# Patient Record
Sex: FEMALE | Race: BLACK OR AFRICAN AMERICAN | Employment: OTHER | URBAN - METROPOLITAN AREA
[De-identification: names, ages, dates, MRNs, and addresses within clinical notes are randomized per-mention and may not be internally consistent; named-entity substitution may affect disease eponyms.]

---

## 2017-08-16 ENCOUNTER — HOSPITAL ENCOUNTER (INPATIENT)
Age: 82
LOS: 5 days | Discharge: NURSING, REHAB OR LONG-TERM CARE | DRG: 871 | End: 2017-08-22
Attending: EMERGENCY MEDICINE | Admitting: INTERNAL MEDICINE
Payer: MEDICARE

## 2017-08-16 ENCOUNTER — APPOINTMENT (OUTPATIENT)
Dept: GENERAL RADIOLOGY | Age: 82
DRG: 871 | End: 2017-08-16
Attending: EMERGENCY MEDICINE
Payer: MEDICARE

## 2017-08-16 ENCOUNTER — APPOINTMENT (OUTPATIENT)
Dept: CT IMAGING | Age: 82
DRG: 871 | End: 2017-08-16
Attending: EMERGENCY MEDICINE
Payer: MEDICARE

## 2017-08-16 DIAGNOSIS — R65.20 SEVERE SEPSIS (HCC): ICD-10-CM

## 2017-08-16 DIAGNOSIS — R47.01 APHASIA: Primary | ICD-10-CM

## 2017-08-16 DIAGNOSIS — A41.9 SEVERE SEPSIS (HCC): ICD-10-CM

## 2017-08-16 LAB
ALBUMIN SERPL-MCNC: 2.3 G/DL (ref 3.2–4.6)
ALBUMIN/GLOB SERPL: 0.6 {RATIO} (ref 1.2–3.5)
ALP SERPL-CCNC: 91 U/L (ref 50–136)
ALT SERPL-CCNC: 19 U/L (ref 12–65)
ANION GAP SERPL CALC-SCNC: 14 MMOL/L (ref 7–16)
AST SERPL-CCNC: 19 U/L (ref 15–37)
BASOPHILS # BLD: 0 K/UL (ref 0–0.2)
BASOPHILS NFR BLD: 0 % (ref 0–2)
BILIRUB SERPL-MCNC: 1.1 MG/DL (ref 0.2–1.1)
BUN SERPL-MCNC: 25 MG/DL (ref 8–23)
CALCIUM SERPL-MCNC: 8.5 MG/DL (ref 8.3–10.4)
CHLORIDE SERPL-SCNC: 107 MMOL/L (ref 98–107)
CO2 SERPL-SCNC: 24 MMOL/L (ref 21–32)
CREAT SERPL-MCNC: 1.29 MG/DL (ref 0.6–1)
DIFFERENTIAL METHOD BLD: ABNORMAL
EOSINOPHIL # BLD: 0 K/UL (ref 0–0.8)
EOSINOPHIL NFR BLD: 0 % (ref 0.5–7.8)
ERYTHROCYTE [DISTWIDTH] IN BLOOD BY AUTOMATED COUNT: 13.5 % (ref 11.9–14.6)
GLOBULIN SER CALC-MCNC: 4 G/DL (ref 2.3–3.5)
GLUCOSE BLD STRIP.AUTO-MCNC: 306 MG/DL (ref 65–100)
GLUCOSE SERPL-MCNC: 306 MG/DL (ref 65–100)
HCT VFR BLD AUTO: 38.7 % (ref 35.8–46.3)
HGB BLD-MCNC: 12.8 G/DL (ref 11.7–15.4)
IMM GRANULOCYTES # BLD: 0.2 K/UL (ref 0–0.5)
IMM GRANULOCYTES NFR BLD: 0.6 % (ref 0–5)
INR BLD: 1.7 (ref 0.9–1.2)
LACTATE BLD-SCNC: 3 MMOL/L (ref 0.5–1.9)
LYMPHOCYTES # BLD: 0.5 K/UL (ref 0.5–4.6)
LYMPHOCYTES NFR BLD: 2 % (ref 13–44)
MAGNESIUM SERPL-MCNC: 2.1 MG/DL (ref 1.8–2.4)
MCH RBC QN AUTO: 29 PG (ref 26.1–32.9)
MCHC RBC AUTO-ENTMCNC: 33.1 G/DL (ref 31.4–35)
MCV RBC AUTO: 87.6 FL (ref 79.6–97.8)
MONOCYTES # BLD: 1.2 K/UL (ref 0.1–1.3)
MONOCYTES NFR BLD: 5 % (ref 4–12)
NEUTS SEG # BLD: 23.4 K/UL (ref 1.7–8.2)
NEUTS SEG NFR BLD: 92 % (ref 43–78)
PLATELET # BLD AUTO: 237 K/UL (ref 150–450)
PMV BLD AUTO: 10.5 FL (ref 10.8–14.1)
POTASSIUM SERPL-SCNC: 3.9 MMOL/L (ref 3.5–5.1)
PROT SERPL-MCNC: 6.3 G/DL (ref 6.3–8.2)
PT BLD: 19.5 SECS (ref 9.6–11.6)
RBC # BLD AUTO: 4.42 M/UL (ref 4.05–5.25)
SODIUM SERPL-SCNC: 145 MMOL/L (ref 136–145)
WBC # BLD AUTO: 25.2 K/UL (ref 4.3–11.1)

## 2017-08-16 PROCEDURE — 87641 MR-STAPH DNA AMP PROBE: CPT | Performed by: EMERGENCY MEDICINE

## 2017-08-16 PROCEDURE — 85025 COMPLETE CBC W/AUTO DIFF WBC: CPT | Performed by: EMERGENCY MEDICINE

## 2017-08-16 PROCEDURE — 87040 BLOOD CULTURE FOR BACTERIA: CPT | Performed by: EMERGENCY MEDICINE

## 2017-08-16 PROCEDURE — 70450 CT HEAD/BRAIN W/O DYE: CPT

## 2017-08-16 PROCEDURE — 82962 GLUCOSE BLOOD TEST: CPT

## 2017-08-16 PROCEDURE — 87205 SMEAR GRAM STAIN: CPT | Performed by: EMERGENCY MEDICINE

## 2017-08-16 PROCEDURE — 87077 CULTURE AEROBIC IDENTIFY: CPT | Performed by: EMERGENCY MEDICINE

## 2017-08-16 PROCEDURE — 93005 ELECTROCARDIOGRAM TRACING: CPT | Performed by: EMERGENCY MEDICINE

## 2017-08-16 PROCEDURE — 71010 XR CHEST PORT: CPT

## 2017-08-16 PROCEDURE — 96367 TX/PROPH/DG ADDL SEQ IV INF: CPT | Performed by: EMERGENCY MEDICINE

## 2017-08-16 PROCEDURE — 74011250636 HC RX REV CODE- 250/636: Performed by: EMERGENCY MEDICINE

## 2017-08-16 PROCEDURE — 87186 SC STD MICRODIL/AGAR DIL: CPT | Performed by: EMERGENCY MEDICINE

## 2017-08-16 PROCEDURE — 96361 HYDRATE IV INFUSION ADD-ON: CPT | Performed by: EMERGENCY MEDICINE

## 2017-08-16 PROCEDURE — 83735 ASSAY OF MAGNESIUM: CPT | Performed by: EMERGENCY MEDICINE

## 2017-08-16 PROCEDURE — 80053 COMPREHEN METABOLIC PANEL: CPT | Performed by: EMERGENCY MEDICINE

## 2017-08-16 PROCEDURE — 74011000258 HC RX REV CODE- 258: Performed by: EMERGENCY MEDICINE

## 2017-08-16 PROCEDURE — 96365 THER/PROPH/DIAG IV INF INIT: CPT | Performed by: EMERGENCY MEDICINE

## 2017-08-16 PROCEDURE — 83605 ASSAY OF LACTIC ACID: CPT

## 2017-08-16 PROCEDURE — 85610 PROTHROMBIN TIME: CPT

## 2017-08-16 PROCEDURE — 99285 EMERGENCY DEPT VISIT HI MDM: CPT | Performed by: EMERGENCY MEDICINE

## 2017-08-16 RX ADMIN — SODIUM CHLORIDE 1000 ML: 900 INJECTION, SOLUTION INTRAVENOUS at 22:17

## 2017-08-16 RX ADMIN — CEFEPIME 1 G: 1 INJECTION, POWDER, FOR SOLUTION INTRAMUSCULAR; INTRAVENOUS at 23:15

## 2017-08-16 RX ADMIN — VANCOMYCIN HYDROCHLORIDE 1000 MG: 1 INJECTION, POWDER, LYOPHILIZED, FOR SOLUTION INTRAVENOUS at 23:49

## 2017-08-17 ENCOUNTER — APPOINTMENT (OUTPATIENT)
Dept: CT IMAGING | Age: 82
DRG: 871 | End: 2017-08-17
Attending: INTERNAL MEDICINE
Payer: MEDICARE

## 2017-08-17 PROBLEM — I63.9 CVA (CEREBRAL VASCULAR ACCIDENT) (HCC): Status: ACTIVE | Noted: 2017-08-17

## 2017-08-17 LAB
APPEARANCE UR: ABNORMAL
ATRIAL RATE: 97 BPM
BACTERIA SPEC CULT: NORMAL
BACTERIA URNS QL MICRO: ABNORMAL /HPF
BILIRUB UR QL: NEGATIVE
CALCULATED R AXIS, ECG10: 14 DEGREES
CALCULATED T AXIS, ECG11: 19 DEGREES
CASTS URNS QL MICRO: ABNORMAL /LPF
CHOLEST SERPL-MCNC: 60 MG/DL
COLOR UR: YELLOW
DIAGNOSIS, 93000: NORMAL
EPI CELLS #/AREA URNS HPF: ABNORMAL /HPF
EST. AVERAGE GLUCOSE BLD GHB EST-MCNC: 146 MG/DL
GLUCOSE BLD STRIP.AUTO-MCNC: 166 MG/DL (ref 65–100)
GLUCOSE BLD STRIP.AUTO-MCNC: 178 MG/DL (ref 65–100)
GLUCOSE BLD STRIP.AUTO-MCNC: 188 MG/DL (ref 65–100)
GLUCOSE BLD STRIP.AUTO-MCNC: 194 MG/DL (ref 65–100)
GLUCOSE BLD STRIP.AUTO-MCNC: 225 MG/DL (ref 65–100)
GLUCOSE UR STRIP.AUTO-MCNC: 100 MG/DL
HBA1C MFR BLD: 6.7 % (ref 4.8–6)
HDLC SERPL-MCNC: 26 MG/DL (ref 40–60)
HDLC SERPL: 2.3 {RATIO}
HGB UR QL STRIP: ABNORMAL
KETONES UR QL STRIP.AUTO: NEGATIVE MG/DL
LACTATE BLD-SCNC: 3.8 MMOL/L (ref 0.5–1.9)
LACTATE SERPL-SCNC: 3.2 MMOL/L (ref 0.4–2)
LACTATE SERPL-SCNC: 3.3 MMOL/L (ref 0.4–2)
LACTATE SERPL-SCNC: 3.8 MMOL/L (ref 0.4–2)
LDLC SERPL CALC-MCNC: 31.4 MG/DL
LEUKOCYTE ESTERASE UR QL STRIP.AUTO: ABNORMAL
LIPID PROFILE,FLP: ABNORMAL
NITRITE UR QL STRIP.AUTO: NEGATIVE
PH UR STRIP: 7.5 [PH] (ref 5–9)
PROCALCITONIN SERPL-MCNC: 3.1 NG/ML
PROT UR STRIP-MCNC: 100 MG/DL
Q-T INTERVAL, ECG07: 334 MS
QRS DURATION, ECG06: 64 MS
QTC CALCULATION (BEZET), ECG08: 413 MS
RBC #/AREA URNS HPF: ABNORMAL /HPF
SERVICE CMNT-IMP: NORMAL
SP GR UR REFRACTOMETRY: 1.02 (ref 1–1.02)
TRIGL SERPL-MCNC: 13 MG/DL (ref 35–150)
UROBILINOGEN UR QL STRIP.AUTO: 0.2 EU/DL (ref 0.2–1)
VENTRICULAR RATE, ECG03: 92 BPM
VLDLC SERPL CALC-MCNC: 2.6 MG/DL (ref 6–23)
WBC URNS QL MICRO: >100 /HPF

## 2017-08-17 PROCEDURE — 65660000000 HC RM CCU STEPDOWN

## 2017-08-17 PROCEDURE — 87186 SC STD MICRODIL/AGAR DIL: CPT | Performed by: INTERNAL MEDICINE

## 2017-08-17 PROCEDURE — 87641 MR-STAPH DNA AMP PROBE: CPT | Performed by: FAMILY MEDICINE

## 2017-08-17 PROCEDURE — 83605 ASSAY OF LACTIC ACID: CPT | Performed by: INTERNAL MEDICINE

## 2017-08-17 PROCEDURE — 74011250637 HC RX REV CODE- 250/637: Performed by: INTERNAL MEDICINE

## 2017-08-17 PROCEDURE — 77030034849

## 2017-08-17 PROCEDURE — 74011000258 HC RX REV CODE- 258: Performed by: INTERNAL MEDICINE

## 2017-08-17 PROCEDURE — 80061 LIPID PANEL: CPT | Performed by: INTERNAL MEDICINE

## 2017-08-17 PROCEDURE — 86580 TB INTRADERMAL TEST: CPT | Performed by: INTERNAL MEDICINE

## 2017-08-17 PROCEDURE — 77030027138 HC INCENT SPIROMETER -A

## 2017-08-17 PROCEDURE — 87086 URINE CULTURE/COLONY COUNT: CPT | Performed by: INTERNAL MEDICINE

## 2017-08-17 PROCEDURE — 83036 HEMOGLOBIN GLYCOSYLATED A1C: CPT | Performed by: INTERNAL MEDICINE

## 2017-08-17 PROCEDURE — 74011250636 HC RX REV CODE- 250/636: Performed by: INTERNAL MEDICINE

## 2017-08-17 PROCEDURE — 83605 ASSAY OF LACTIC ACID: CPT

## 2017-08-17 PROCEDURE — 84145 PROCALCITONIN (PCT): CPT | Performed by: INTERNAL MEDICINE

## 2017-08-17 PROCEDURE — 82962 GLUCOSE BLOOD TEST: CPT

## 2017-08-17 PROCEDURE — 70450 CT HEAD/BRAIN W/O DYE: CPT

## 2017-08-17 PROCEDURE — 87088 URINE BACTERIA CULTURE: CPT | Performed by: INTERNAL MEDICINE

## 2017-08-17 PROCEDURE — 74011636637 HC RX REV CODE- 636/637: Performed by: INTERNAL MEDICINE

## 2017-08-17 PROCEDURE — 74011000302 HC RX REV CODE- 302: Performed by: INTERNAL MEDICINE

## 2017-08-17 PROCEDURE — 36415 COLL VENOUS BLD VENIPUNCTURE: CPT | Performed by: INTERNAL MEDICINE

## 2017-08-17 PROCEDURE — 81001 URINALYSIS AUTO W/SCOPE: CPT | Performed by: EMERGENCY MEDICINE

## 2017-08-17 RX ORDER — ATORVASTATIN CALCIUM 40 MG/1
40 TABLET, FILM COATED ORAL
Status: DISCONTINUED | OUTPATIENT
Start: 2017-08-17 | End: 2017-08-22 | Stop reason: HOSPADM

## 2017-08-17 RX ORDER — SODIUM CHLORIDE 0.9 % (FLUSH) 0.9 %
5-10 SYRINGE (ML) INJECTION AS NEEDED
Status: DISCONTINUED | OUTPATIENT
Start: 2017-08-17 | End: 2017-08-22 | Stop reason: HOSPADM

## 2017-08-17 RX ORDER — HEPARIN SODIUM 5000 [USP'U]/ML
5000 INJECTION, SOLUTION INTRAVENOUS; SUBCUTANEOUS EVERY 8 HOURS
Status: DISCONTINUED | OUTPATIENT
Start: 2017-08-17 | End: 2017-08-22 | Stop reason: HOSPADM

## 2017-08-17 RX ORDER — VANCOMYCIN HYDROCHLORIDE
1250 EVERY 24 HOURS
Status: DISCONTINUED | OUTPATIENT
Start: 2017-08-17 | End: 2017-08-17

## 2017-08-17 RX ORDER — ACETAMINOPHEN 650 MG/1
650 SUPPOSITORY RECTAL
Status: DISCONTINUED | OUTPATIENT
Start: 2017-08-17 | End: 2017-08-22 | Stop reason: HOSPADM

## 2017-08-17 RX ORDER — INSULIN GLARGINE 100 [IU]/ML
0.2 INJECTION, SOLUTION SUBCUTANEOUS DAILY
Status: DISCONTINUED | OUTPATIENT
Start: 2017-08-17 | End: 2017-08-22

## 2017-08-17 RX ORDER — DEXTROSE MONOHYDRATE AND SODIUM CHLORIDE 5; .9 G/100ML; G/100ML
150 INJECTION, SOLUTION INTRAVENOUS CONTINUOUS
Status: DISCONTINUED | OUTPATIENT
Start: 2017-08-17 | End: 2017-08-18

## 2017-08-17 RX ORDER — HYDRALAZINE HYDROCHLORIDE 20 MG/ML
20 INJECTION INTRAMUSCULAR; INTRAVENOUS
Status: DISCONTINUED | OUTPATIENT
Start: 2017-08-17 | End: 2017-08-22 | Stop reason: HOSPADM

## 2017-08-17 RX ORDER — SODIUM CHLORIDE 0.9 % (FLUSH) 0.9 %
5 SYRINGE (ML) INJECTION EVERY 8 HOURS
Status: DISCONTINUED | OUTPATIENT
Start: 2017-08-17 | End: 2017-08-22 | Stop reason: HOSPADM

## 2017-08-17 RX ORDER — INSULIN LISPRO 100 [IU]/ML
INJECTION, SOLUTION INTRAVENOUS; SUBCUTANEOUS EVERY 6 HOURS
Status: DISCONTINUED | OUTPATIENT
Start: 2017-08-17 | End: 2017-08-22

## 2017-08-17 RX ORDER — ACETAMINOPHEN 325 MG/1
650 TABLET ORAL
Status: DISCONTINUED | OUTPATIENT
Start: 2017-08-17 | End: 2017-08-22 | Stop reason: HOSPADM

## 2017-08-17 RX ORDER — ONDANSETRON 2 MG/ML
4 INJECTION INTRAMUSCULAR; INTRAVENOUS
Status: DISCONTINUED | OUTPATIENT
Start: 2017-08-17 | End: 2017-08-22 | Stop reason: HOSPADM

## 2017-08-17 RX ORDER — HEPARIN SODIUM 5000 [USP'U]/ML
5000 INJECTION, SOLUTION INTRAVENOUS; SUBCUTANEOUS EVERY 8 HOURS
Status: DISCONTINUED | OUTPATIENT
Start: 2017-08-17 | End: 2017-08-17 | Stop reason: SDUPTHER

## 2017-08-17 RX ORDER — SODIUM CHLORIDE 0.9 % (FLUSH) 0.9 %
5-10 SYRINGE (ML) INJECTION EVERY 8 HOURS
Status: DISCONTINUED | OUTPATIENT
Start: 2017-08-17 | End: 2017-08-17

## 2017-08-17 RX ADMIN — DEXTROSE MONOHYDRATE AND SODIUM CHLORIDE 150 ML/HR: 5; .9 INJECTION, SOLUTION INTRAVENOUS at 21:30

## 2017-08-17 RX ADMIN — TUBERCULIN PURIFIED PROTEIN DERIVATIVE 5 UNITS: 5 INJECTION INTRADERMAL at 08:58

## 2017-08-17 RX ADMIN — INSULIN LISPRO 2 UNITS: 100 INJECTION, SOLUTION INTRAVENOUS; SUBCUTANEOUS at 18:00

## 2017-08-17 RX ADMIN — INSULIN LISPRO 2 UNITS: 100 INJECTION, SOLUTION INTRAVENOUS; SUBCUTANEOUS at 11:57

## 2017-08-17 RX ADMIN — HEPARIN SODIUM 5000 UNITS: 5000 INJECTION, SOLUTION INTRAVENOUS; SUBCUTANEOUS at 15:12

## 2017-08-17 RX ADMIN — INSULIN LISPRO 2 UNITS: 100 INJECTION, SOLUTION INTRAVENOUS; SUBCUTANEOUS at 08:00

## 2017-08-17 RX ADMIN — INSULIN LISPRO 2 UNITS: 100 INJECTION, SOLUTION INTRAVENOUS; SUBCUTANEOUS at 23:05

## 2017-08-17 RX ADMIN — CEFEPIME HYDROCHLORIDE 1 G: 1 INJECTION, POWDER, FOR SOLUTION INTRAMUSCULAR; INTRAVENOUS at 22:30

## 2017-08-17 RX ADMIN — INSULIN GLARGINE 17 UNITS: 100 INJECTION, SOLUTION SUBCUTANEOUS at 09:00

## 2017-08-17 RX ADMIN — DEXTROSE MONOHYDRATE AND SODIUM CHLORIDE 75 ML/HR: 5; .9 INJECTION, SOLUTION INTRAVENOUS at 04:38

## 2017-08-17 RX ADMIN — ACETAMINOPHEN 650 MG: 650 SUPPOSITORY RECTAL at 07:08

## 2017-08-17 RX ADMIN — HEPARIN SODIUM 5000 UNITS: 5000 INJECTION, SOLUTION INTRAVENOUS; SUBCUTANEOUS at 23:04

## 2017-08-17 NOTE — ED TRIAGE NOTES
Patient arrives via EMS from [facility] with right sided facial droop, aphasia, altered mental status. EMS states she was last seen normal about 45min PTA.

## 2017-08-17 NOTE — ED NOTES
Pt noted to be shaking and holding her arms/legs very stiffly. Temp is 98.7 axillary. Would like to get a rectal temp, BP is elevated at 209/140. ? Possible seizure activity. Call placed to Dr Andreea Arroyo. Orders received.

## 2017-08-17 NOTE — PROGRESS NOTES
Pt has a pair of earrings with her and they are inside of a denture cup locked in our nurses supply/patient belonging cart. Passed on to oncoming nurse in bedside report.

## 2017-08-17 NOTE — ED NOTES
TRANSFER - OUT REPORT:    Verbal report given to Elvira Romo Pap  being transferred to 2nd floor for routine progression of care       Report consisted of patients Situation, Background, Assessment and   Recommendations(SBAR). Information from the following report(s) SBAR was reviewed with the receiving nurse. Lines:   Peripheral IV 09/09/16 Left Forearm (Active)        Opportunity for questions and clarification was provided.       Patient transported with:   [employer]

## 2017-08-17 NOTE — PROGRESS NOTES
END OF SHIFT NOTE:    INTAKE/OUTPUT     Voiding: NO  Catheter: YES  Drain:              Flatus: Patient does have flatus present. Stool:  0 occurrences. Characteristics:  Stool Assessment  Stool Color: Brown  Stool Appearance: Loose  Stool Amount: Small  Stool Source/Status: Rectum    Emesis: 0 occurrences. Characteristics:        VITAL SIGNS  Patient Vitals for the past 12 hrs:   Temp Pulse Resp BP SpO2   08/17/17 1555 100.1 °F (37.8 °C) 83 16 96/56 98 %   08/17/17 1116 (!) 100.7 °F (38.2 °C) 89 16 94/55 98 %   08/17/17 0729 (!) 102.3 °F (39.1 °C) 98 17 100/61 99 %       Pain Assessment  Pain Intensity 1: 0 (08/17/17 1555)        Patient Stated Pain Goal: 0    Ambulating  No    Shift report given to oncoming nurse at the bedside.     Dunia Pulido RN

## 2017-08-17 NOTE — PROGRESS NOTES
Primary Nurse Marcos Acharya RN and Xin Sosa RN performed a dual skin assessment on this patient No impairment noted  Rony score is 13    Pt has very dry skin with some mottling noticed to BLE and some bruising with scabs on legs and toes of L foot. Reddened but blanchable skin to reynold area and scars r/t old skin break down on sacrum area.

## 2017-08-17 NOTE — H&P
History and Physical    Patient: Krystle Chakraborty MRN: [id number]  SSN: [id number]    YOB: 1935  Age: [de-identified] y.o. Sex: female      Subjective:      Krystle Chakraborty is a [de-identified] y.o. female who presents with confusion, right-sided facial droop, and aphasia. She is accompanied by her daughter and granddaughter in ED at time of exam. Daughter reports being notified of patient's change in status earlier today from her caregiver. Patient has past history of multiple strokes, placement of DVT filter, and strong family history of CVA.       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 ([facility] [address] 1/26/2016    Hemiplegia affecting dominant side ([facility] [address] 1/26/2016    Hypertension     Mixed hyperlipidemia 1/26/2016    Neuropathy in diabetes ([facility] [address] 1/26/2016    Overactive bladder 1/26/2016    Psychiatric disorder     Stroke [facility])      Past Surgical History:   Procedure Laterality Date    HX HEENT  1950's    left sinus    HX ORTHOPAEDIC  2006    hip replacement and filter in right leg      Family History   Problem Relation Age of Onset    Other Other      aneurysm    Cancer Other      cancer    Thyroid Disease Other     Hypertension Other     Diabetes Other     Dementia Other     Heart Disease Other     Dementia Daughter      factor 5, visual heart murmur    Hypertension Daughter     Diabetes Daughter     Hypertension Mother     Heart Disease Mother     Alzheimer Father     Hypertension Father     Hypertension Paternal Aunt     Heart Disease Paternal Uncle     Hypertension Paternal Uncle     Other Maternal Grandmother      aneurysm    Hypertension Maternal Grandmother      Social History   Substance Use Topics    Smoking status: Former Smoker    Smokeless tobacco: Not on file      Comment: smoked from 15 yoa to about 78 yoa    Alcohol use No      Prior to Admission medications Medication Sig Start Date End Date Taking? Authorizing Provider   metoprolol succinate (TOPROL-XL) 100 mg tablet Take 1 Tab by mouth daily. 9/14/16   Oralia Fontanez MD   lisinopril (PRINIVIL, ZESTRIL) 40 mg tablet Take 1 Tab by mouth daily. 9/14/16   Oralia Fontanez MD   atorvastatin (LIPITOR) 80 mg tablet 1 Tab by Per G Tube route nightly. 9/14/16   Oralia Fontanez MD   insulin glargine (LANTUS) 100 unit/mL injection 24 Units by SubCUTAneous route daily. 7/7/16   Carito Juárez MD   DULoxetine (CYMBALTA) 60 mg capsule Take 1 Cap by mouth daily. 4/22/16   Carito Juárez MD        Allergies   Allergen Reactions    Pcn [Penicillins] Angioedema     Pt tolerated rocephin 11/2015       Review of Systems:  A comprehensive review of systems was negative except for that written in the History of Present Illness. Objective:     Vitals:    08/17/17 0014 08/17/17 0029 08/17/17 0045 08/17/17 0100   BP: 144/73 143/73 141/74 139/73   Pulse: 71 72 71 70   Resp: 23 23 25 24   Temp:       SpO2: 94% 95% 95% 95%   Weight:       Height:            Physical Exam:  General:  Alert, cooperative, no distress, appears stated age. Eyes:  Conjunctivae/corneas clear. PERRL, EOMs intact. Fundi benign   Ears:  Normal TMs and external ear canals both ears. Nose: Nares normal. Septum midline. Mucosa normal. No drainage or sinus tenderness. Mouth/Throat: Lips, mucosa, and tongue normal. Teeth and gums normal.   Neck: Supple, symmetrical, trachea midline, no adenopathy, thyroid: no enlargment/tenderness/nodules, no carotid bruit and no JVD. Back:   Symmetric, no curvature. ROM normal. No CVA tenderness. Lungs:   Clear to auscultation bilaterally. Heart:  Regular rate and rhythm, S1, S2 normal, no murmur, click, rub or gallop. Abdomen:   Soft, non-tender. Bowel sounds normal. No masses,  No organomegaly. Extremities: Extremities normal, atraumatic, no cyanosis or edema.    Pulses: 2+ and symmetric all extremities. Skin: Skin color, texture, turgor normal. No rashes or lesions. No decubitus ulcers observed on back, bottom, or heels. Lymph nodes: Cervical, supraclavicular, and axillary nodes normal.   Neurologic:  PERRLA; no nystagmus; +chronic R arm contracture. Non-verbal. Rouses to painful stimuli. No tremors. Assessment:   Hospital Problems  Date Reviewed: 7/14/2016          Codes Class Noted POA    CVA (cerebral vascular accident) [facility]) ICD-10-CM: I63.9  ICD-9-CM: 434.91  8/17/2017 Unknown              Plan:     1) Stroke- +strong personal history of both hemorrhagic and ischemic stroke. +change in baseline status. Await results of CT scan of head, then will re-evaluate additional imaging options. Holding aspirin until confidently can rule out hemorrhagic stroke. Patient will be NPO. 2) Sepsis- BP stable, but WBC markedly elevated at 25.2; Lactic acid mildly elevated. Procalcitonin ordered. CXR unrevealing. Await results of blood and urine culture. Continue with empiric vanc and cefepime. 3) Diabetes Mellitus, type II- holding oral anti-hyperglycemics. D5W with NS maintenance fluids. Checking POC glucose. Starting modest dose of long-acting insulin. Checking A1c.    4) DVT Prophylaxis- starting SCDs/compression hose, and subQ heparin. 5) FENGI- NPO- D5NS ordered. 6) Code Status- FULL CODE.     Signed By: Aliya Linares MD     August 17, 2017

## 2017-08-17 NOTE — PROGRESS NOTES
OT NOTE:    OT order received, chart reviewed. Pt unable to arouse this am, unresponsive. OT will follow up later as time and schedule permit. Thank you.       Lubna Olmos MS, OTR/L  8/17/2017

## 2017-08-17 NOTE — PROGRESS NOTES
SPEECH PATHOLOGY NOTE:    RN reports limited change from this am.  Will attempt bedside swallowing assessment in the am.    Jonathan Natarajan MS, CCC-SLP

## 2017-08-17 NOTE — PROGRESS NOTES
Critical lab value, lactic acid = 3.3, MD notified via telephone. No new orders received. MD stated to continue to monitor BP and urine output.

## 2017-08-17 NOTE — PROGRESS NOTES
TRANSFER - IN REPORT:    Verbal report received from Carmen Lezama RN(name) on [address]  being received from ER (unit) for routine progression of care      Report consisted of patients Situation, Background, Assessment and   Recommendations(SBAR). Information from the following report(s) Kardex was reviewed with the receiving nurse. Opportunity for questions and clarification was provided. Assessment completed upon patients arrival to unit and care assumed.

## 2017-08-17 NOTE — ED NOTES
Temp 102.3 rectally. Computer is installing updates so med was not scanned at the time of administration: Tylenol 650 mg rectal suppository. Noted BP is now stable at 157/81. Pt is becoming more alert having the episode of rigidity for approx 5 minutes.

## 2017-08-17 NOTE — ED PROVIDER NOTES
HPI Comments: Patient is an 71-year-old female who is coming in after having  Right-sided facial droop and trouble with her speech that started about 8:30 PM per the nursing home. She has a history of having a right-sided CVA as well per EMS this is not her baseline. Patient is unable to communicate any further history. She  Is able to follow simple commands. I reviewed old records which did show a hemorrhagic stroke last year. Patient is a [de-identified] y.o. female presenting with altered mental status. The history is provided by the patient. Altered mental status    Pertinent negatives include no weakness and no numbness.         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 ([facility] [address] 1/26/2016    Hemiplegia affecting dominant side ([address] 1/26/2016    Hypertension     Mixed hyperlipidemia 1/26/2016    Neuropathy in diabetes ([facility] [address] 1/26/2016    Overactive bladder 1/26/2016    Psychiatric disorder     Stroke [facility])        Past Surgical History:   Procedure Laterality Date    HX HEENT  1950's    left sinus    HX ORTHOPAEDIC  2006    hip replacement and filter in right leg         Family History:   Problem Relation Age of Onset    Other Other      aneurysm    Cancer Other      cancer    Thyroid Disease Other     Hypertension Other     Diabetes Other     Dementia Other     Heart Disease Other     Dementia Daughter      factor 5, visual heart murmur    Hypertension Daughter     Diabetes Daughter     Hypertension Mother     Heart Disease Mother     Alzheimer Father     Hypertension Father     Hypertension Paternal Aunt     Heart Disease Paternal Uncle     Hypertension Paternal Uncle     Other Maternal Grandmother      aneurysm    Hypertension Maternal Grandmother        Social History     Social History    Marital status:      Spouse name: N/A    Number of children: N/A    Years of education: N/A     Occupational History    Not on file. Social History Main Topics    Smoking status: Former Smoker    Smokeless tobacco: Not on file      Comment: smoked from 15 yoa to about 78 yoa    Alcohol use No    Drug use: No    Sexual activity: Not on file     Other Topics Concern    Not on file     Social History Narrative         ALLERGIES: Pcn [penicillins]    Review of Systems   Constitutional: Negative for chills and fever. Skin: Negative for color change, pallor, rash and wound. Neurological: Negative for weakness and numbness. Vitals:    08/16/17 2146   BP: 131/68   Pulse: 95   Resp: 18   Temp: 99.8 °F (37.7 °C)   SpO2: 94%   Weight: 85.7 kg (189 lb)   Height: 5' 7\" (1.702 m)            Physical Exam   Constitutional: She appears well-developed and well-nourished. No distress. HENT:   Head: Normocephalic and atraumatic. Eyes: Conjunctivae and EOM are normal. Pupils are equal, round, and reactive to light. No scleral icterus. Neck: Normal range of motion. Neck supple. No tracheal deviation present. No thyromegaly present. Cardiovascular: Normal rate, regular rhythm and normal heart sounds. Pulmonary/Chest: Effort normal and breath sounds normal. No stridor. No respiratory distress. She has no wheezes. She has no rales. She exhibits no tenderness. Abdominal: Soft. Bowel sounds are normal. She exhibits no distension. There is no tenderness. There is no rebound and no guarding. Neurological: She is alert. Right arm is contracted. There is weakness in all other extremities. Patient able to slightly move each of them. Patient is unable to speak. Skin: Skin is warm and dry. No rash noted. She is not diaphoretic. No erythema. Psychiatric: She has a normal mood and affect. Her behavior is normal. Judgment and thought content normal.   Nursing note and vitals reviewed.        MDM  Number of Diagnoses or Management Options  Diagnosis management comments: Patient's head CT does not show any acute bleeds or changes. I discussed case with neurology  And we both agree no TPA particularly given history of hemorrhagic stroke. Patient's blood work is coming back showing some signs of severe sepsis so I am empirically giving antibiotics and fluids and looking for source. Patient will eventually get hospital admission for further care. Lindsey Bowman MD; 8/16/2017 @10:31 PM Voice dictation software was used during the making of this note. This software is not perfect and grammatical and other typographical errors may be present.   This note has not been proofread for errors.  ===================================================================        Amount and/or Complexity of Data Reviewed  Clinical lab tests: ordered and reviewed (Results for orders placed or performed during the hospital encounter of 08/16/17  -CBC WITH AUTOMATED DIFF       Result                                            Value                         Ref Range                       WBC                                               25.2 (H)                      4.3 - 11.1 K/uL                 RBC                                               4.42                          4.05 - 5.25 M/uL                HGB                                               12.8                          11.7 - 15.4 g/dL                HCT                                               38.7                          35.8 - 46.3 %                   MCV                                               87.6                          79.6 - 97.8 FL                  MCH                                               29.0                          26.1 - 32.9 PG                  MCHC                                              33.1                          31.4 - 35.0 g/dL                RDW                                               13.5                          11.9 - 14.6 %                   PLATELET 237                           150 - 450 K/uL                  MPV                                               10.5 (L)                      10.8 - 14.1 FL                  DF                                                AUTOMATED                                                     NEUTROPHILS                                       92 (H)                        43 - 78 %                       LYMPHOCYTES                                       2 (L)                         13 - 44 %                       MONOCYTES                                         5                             4.0 - 12.0 %                    EOSINOPHILS                                       0 (L)                         0.5 - 7.8 %                     BASOPHILS                                         0                             0.0 - 2.0 %                     IMMATURE GRANULOCYTES                             0.6                           0.0 - 5.0 %                     ABS. NEUTROPHILS                                  23.4 (H)                      1.7 - 8.2 K/UL                  ABS. LYMPHOCYTES                                  0.5                           0.5 - 4.6 K/UL                  ABS. MONOCYTES                                    1.2                           0.1 - 1.3 K/UL                  ABS. EOSINOPHILS                                  0.0                           0.0 - 0.8 K/UL                  ABS. BASOPHILS                                    0.0                           0.0 - 0.2 K/UL                  ABS. IMM.  GRANS.                                  0.2                           0.0 - 0.5 K/UL             -METABOLIC PANEL, COMPREHENSIVE       Result                                            Value                         Ref Range                       Sodium                                            145                           136 - 145 mmol/L                Potassium 3.9                           3.5 - 5.1 mmol/L                Chloride                                          107                           98 - 107 mmol/L                 CO2                                               24                            21 - 32 mmol/L                  Anion gap                                         14                            7 - 16 mmol/L                   Glucose                                           306 (H)                       65 - 100 mg/dL                  BUN                                               25 (H)                        8 - 23 MG/DL                    Creatinine                                        1.29 (H)                      0.6 - 1.0 MG/DL                 GFR est AA                                        51 (L)                        >60 ml/min/1.73m2               GFR est non-AA                                    42 (L)                        >60 ml/min/1.73m2               Calcium                                           8.5                           8.3 - 10.4 MG/DL                Bilirubin, total                                  1.1                           0.2 - 1.1 MG/DL                 ALT (SGPT)                                        19                            12 - 65 U/L                     AST (SGOT)                                        19                            15 - 37 U/L                     Alk. phosphatase                                  91                            50 - 136 U/L                    Protein, total                                    6.3                           6.3 - 8.2 g/dL                  Albumin                                           2.3 (L)                       3.2 - 4.6 g/dL                  Globulin                                          4.0 (H)                       2.3 - 3.5 g/dL                  A-G Ratio                                         0.6 (L)                       1.2 - 3.5 -MAGNESIUM       Result                                            Value                         Ref Range                       Magnesium                                         2.1                           1.8 - 2.4 mg/dL            -POC LACTIC ACID       Result                                            Value                         Ref Range                       Lactic Acid (POC)                                 3.0 (H)                       0.5 - 1.9 mmol/L           -GLUCOSE, POC       Result                                            Value                         Ref Range                       Glucose (POC)                                     306 (H)                       65 - 100 mg/dL             -POC PT/INR       Result                                            Value                         Ref Range                       Prothrombin time (POC)                            19.5 (H)                      9.6 - 11.6 SECS                 INR (POC)                                         1.7 (H)                       0.9 - 1.2                  -EKG, 12 LEAD, INITIAL       Result                                            Value                         Ref Range                       Ventricular Rate                                  92                            BPM                             Atrial Rate                                       97                            BPM                             QRS Duration                                      64                            ms                              Q-T Interval                                      334                           ms                              QTC Calculation (Bezet)                           413                           ms                              Calculated R Axis                                 14                            degrees                         Calculated T Axis                                 19 degrees                         Diagnosis                                                                                                   !! AGE AND GENDER SPECIFIC ECG ANALYSIS !! Accelerated Junctional rhythm   Nonspecific T wave abnormality   Abnormal ECG   When compared with ECG of 17-FEB-2016 13:44,   Junctional rhythm has replaced Sinus rhythm   Vent.  rate has increased BY  33 BPM   Criteria for Septal infarct are no longer Present    )  Tests in the radiology section of CPT®: ordered and reviewed      ED Course       Procedures

## 2017-08-17 NOTE — PROGRESS NOTES
END OF SHIFT NOTE:    INTAKE/OUTPUT     Voiding: YES  (pt wears incontinent briefs)  Catheter: NO  Drain:              Flatus: Patient does have flatus present. Stool:  1 occurrences. Characteristics:  Stool Assessment  Stool Color: Brown  Stool Appearance: Loose  Stool Amount: Small  Stool Source/Status: Rectum    Emesis: 0 occurrences. Characteristics:        VITAL SIGNS  Patient Vitals for the past 12 hrs:   Temp Pulse Resp BP SpO2   08/17/17 0405 (!) 101.9 °F (38.8 °C) 97 17 113/56 99 %   08/17/17 0329 - (!) 101 30 143/63 97 %   08/17/17 0314 - 100 28 147/65 97 %   08/17/17 0300 - 97 28 142/64 98 %   08/17/17 0245 - 93 (!) 31 143/63 98 %   08/17/17 0215 - 95 (!) 34 135/69 -   08/17/17 0212 - 96 (!) 40 163/77 (!) 82 %   08/17/17 0100 - 70 24 139/73 95 %   08/17/17 0045 - 71 25 141/74 95 %   08/17/17 0029 - 72 23 143/73 95 %   08/17/17 0014 - 71 23 144/73 94 %   08/16/17 2359 - 72 23 155/80 95 %   08/16/17 2315 - 75 24 130/71 94 %   08/16/17 2300 - 79 26 131/74 94 %   08/16/17 2244 - 81 27 130/75 -   08/16/17 2233 - 85 28 146/78 96 %   08/16/17 2214 - 88 21 137/76 97 %   08/16/17 2159 - 89 25 131/72 97 %   08/16/17 2146 99.8 °F (37.7 °C) 95 18 131/68 94 %       Pain Assessment  Pain Intensity 1: 0 (pt can not tell me appropriately) (08/17/17 0405)             Ambulating  No    Shift report given to oncoming nurse at the bedside.     Diego Gonsalves RN

## 2017-08-17 NOTE — PROGRESS NOTES
PROGRESS NOTE    Patient seen and examined. Admitted after midnight. Unresponsive. UA with pyuria and bacteria. Order urine culture. Stop Vanc. Continue Cefepime. Increase IVFs due to elevated Lactic Acid. PT/OT/ST. PPD. Will follow closely.     Xavi Santiago DO

## 2017-08-17 NOTE — PROGRESS NOTES
SPEECH PATHOLOGY NOTE:    Orders received for bedside swallowing assessment. Patient is unresponsive at this time. Will re-attempt in the pm if there is a change in status. Thank you.     Aniya Akers MS, CCC-SLP

## 2017-08-17 NOTE — PROGRESS NOTES
Pharmacokinetic Consult to Pharmacist    Nick Gould is a [de-identified] y.o. female being treated for sepsis with vancomycin and cefepime. Height: 5' 7\" (170.2 cm)  Weight: 85.7 kg (189 lb)  Lab Results   Component Value Date/Time    BUN 25 08/16/2017 09:50 PM    Creatinine 1.29 08/16/2017 09:50 PM    WBC 25.2 08/16/2017 09:50 PM    Lactic Acid (POC) 3.8 08/17/2017 12:04 AM      Estimated Creatinine Clearance: 39.1 mL/min (based on Cr of 1.29). CULTURES:  Pending. Day 1 of vancomycin. Goal trough is 15-20. Vancomycin dose initiated at 1g x1 in ER, then 1250mg q 24h. Will continue to follow patient.       Thank you,  Maurice Louise, PharmD  Clinical Pharmacist

## 2017-08-17 NOTE — PROGRESS NOTES
's initial visit attempted. Ms. Ashley Martin was asleep. Provided business card.      Kala Bajwa 70  Board Certified

## 2017-08-17 NOTE — PROGRESS NOTES
PT note: PT evaluation received and chart reviewed. Pt unable to arouse this am, unresponsive. PT will follow up later as time and schedule permit. Thank you.     Radha Hines, PT  8/17/2017

## 2017-08-18 LAB
ANION GAP SERPL CALC-SCNC: 9 MMOL/L (ref 7–16)
BASOPHILS # BLD: 0 K/UL (ref 0–0.2)
BASOPHILS NFR BLD: 0 % (ref 0–2)
BUN SERPL-MCNC: 33 MG/DL (ref 8–23)
CALCIUM SERPL-MCNC: 7.9 MG/DL (ref 8.3–10.4)
CHLORIDE SERPL-SCNC: 119 MMOL/L (ref 98–107)
CO2 SERPL-SCNC: 23 MMOL/L (ref 21–32)
CREAT SERPL-MCNC: 1.67 MG/DL (ref 0.6–1)
DIFFERENTIAL METHOD BLD: ABNORMAL
EOSINOPHIL # BLD: 0 K/UL (ref 0–0.8)
EOSINOPHIL NFR BLD: 0 % (ref 0.5–7.8)
ERYTHROCYTE [DISTWIDTH] IN BLOOD BY AUTOMATED COUNT: 14.1 % (ref 11.9–14.6)
GLUCOSE BLD STRIP.AUTO-MCNC: 111 MG/DL (ref 65–100)
GLUCOSE BLD STRIP.AUTO-MCNC: 175 MG/DL (ref 65–100)
GLUCOSE BLD STRIP.AUTO-MCNC: 187 MG/DL (ref 65–100)
GLUCOSE BLD STRIP.AUTO-MCNC: 93 MG/DL (ref 65–100)
GLUCOSE SERPL-MCNC: 125 MG/DL (ref 65–100)
HCT VFR BLD AUTO: 38.5 % (ref 35.8–46.3)
HGB BLD-MCNC: 12.2 G/DL (ref 11.7–15.4)
IMM GRANULOCYTES # BLD: 0.2 K/UL (ref 0–0.5)
IMM GRANULOCYTES NFR BLD: 0.9 % (ref 0–5)
INR PPP: 1.1 (ref 0.9–1.2)
LACTATE SERPL-SCNC: 1.8 MMOL/L (ref 0.4–2)
LACTATE SERPL-SCNC: 2.3 MMOL/L (ref 0.4–2)
LYMPHOCYTES # BLD: 0.5 K/UL (ref 0.5–4.6)
LYMPHOCYTES NFR BLD: 2 % (ref 13–44)
MCH RBC QN AUTO: 28.2 PG (ref 26.1–32.9)
MCHC RBC AUTO-ENTMCNC: 31.7 G/DL (ref 31.4–35)
MCV RBC AUTO: 88.9 FL (ref 79.6–97.8)
MM INDURATION POC: NORMAL MM (ref 0–5)
MONOCYTES # BLD: 0.1 K/UL (ref 0.1–1.3)
MONOCYTES NFR BLD: 0 % (ref 4–12)
NEUTS SEG # BLD: 24.6 K/UL (ref 1.7–8.2)
NEUTS SEG NFR BLD: 97 % (ref 43–78)
PLATELET # BLD AUTO: 181 K/UL (ref 150–450)
PMV BLD AUTO: 11.4 FL (ref 10.8–14.1)
POTASSIUM SERPL-SCNC: 3.8 MMOL/L (ref 3.5–5.1)
PPD POC: NORMAL NEGATIVE
PROTHROMBIN TIME: 12.5 SEC (ref 9.6–12)
RBC # BLD AUTO: 4.33 M/UL (ref 4.05–5.25)
SODIUM SERPL-SCNC: 151 MMOL/L (ref 136–145)
WBC # BLD AUTO: 25.4 K/UL (ref 4.3–11.1)

## 2017-08-18 PROCEDURE — 82962 GLUCOSE BLOOD TEST: CPT

## 2017-08-18 PROCEDURE — 65660000000 HC RM CCU STEPDOWN

## 2017-08-18 PROCEDURE — 87040 BLOOD CULTURE FOR BACTERIA: CPT | Performed by: INTERNAL MEDICINE

## 2017-08-18 PROCEDURE — 94760 N-INVAS EAR/PLS OXIMETRY 1: CPT

## 2017-08-18 PROCEDURE — 80048 BASIC METABOLIC PNL TOTAL CA: CPT | Performed by: INTERNAL MEDICINE

## 2017-08-18 PROCEDURE — 83605 ASSAY OF LACTIC ACID: CPT | Performed by: INTERNAL MEDICINE

## 2017-08-18 PROCEDURE — 36415 COLL VENOUS BLD VENIPUNCTURE: CPT | Performed by: INTERNAL MEDICINE

## 2017-08-18 PROCEDURE — 85610 PROTHROMBIN TIME: CPT | Performed by: INTERNAL MEDICINE

## 2017-08-18 PROCEDURE — 97162 PT EVAL MOD COMPLEX 30 MIN: CPT

## 2017-08-18 PROCEDURE — 74011000258 HC RX REV CODE- 258: Performed by: INTERNAL MEDICINE

## 2017-08-18 PROCEDURE — 92610 EVALUATE SWALLOWING FUNCTION: CPT

## 2017-08-18 PROCEDURE — 77030019605

## 2017-08-18 PROCEDURE — 77010033678 HC OXYGEN DAILY

## 2017-08-18 PROCEDURE — 74011636637 HC RX REV CODE- 636/637: Performed by: INTERNAL MEDICINE

## 2017-08-18 PROCEDURE — 85025 COMPLETE CBC W/AUTO DIFF WBC: CPT | Performed by: INTERNAL MEDICINE

## 2017-08-18 PROCEDURE — 74011250637 HC RX REV CODE- 250/637: Performed by: INTERNAL MEDICINE

## 2017-08-18 PROCEDURE — 74011250636 HC RX REV CODE- 250/636: Performed by: INTERNAL MEDICINE

## 2017-08-18 PROCEDURE — 97165 OT EVAL LOW COMPLEX 30 MIN: CPT

## 2017-08-18 RX ORDER — DEXTROSE MONOHYDRATE AND SODIUM CHLORIDE 5; .45 G/100ML; G/100ML
150 INJECTION, SOLUTION INTRAVENOUS CONTINUOUS
Status: DISCONTINUED | OUTPATIENT
Start: 2017-08-18 | End: 2017-08-22

## 2017-08-18 RX ADMIN — CEFEPIME HYDROCHLORIDE 1 G: 1 INJECTION, POWDER, FOR SOLUTION INTRAMUSCULAR; INTRAVENOUS at 22:16

## 2017-08-18 RX ADMIN — HEPARIN SODIUM 5000 UNITS: 5000 INJECTION, SOLUTION INTRAVENOUS; SUBCUTANEOUS at 22:17

## 2017-08-18 RX ADMIN — VANCOMYCIN HYDROCHLORIDE 1000 MG: 1 INJECTION, POWDER, LYOPHILIZED, FOR SOLUTION INTRAVENOUS at 00:09

## 2017-08-18 RX ADMIN — ATORVASTATIN CALCIUM 40 MG: 40 TABLET, FILM COATED ORAL at 22:16

## 2017-08-18 RX ADMIN — Medication 5 ML: at 22:17

## 2017-08-18 RX ADMIN — Medication 5 ML: at 06:21

## 2017-08-18 RX ADMIN — HEPARIN SODIUM 5000 UNITS: 5000 INJECTION, SOLUTION INTRAVENOUS; SUBCUTANEOUS at 06:21

## 2017-08-18 RX ADMIN — HEPARIN SODIUM 5000 UNITS: 5000 INJECTION, SOLUTION INTRAVENOUS; SUBCUTANEOUS at 14:20

## 2017-08-18 RX ADMIN — DEXTROSE MONOHYDRATE AND SODIUM CHLORIDE 150 ML/HR: 5; .45 INJECTION, SOLUTION INTRAVENOUS at 17:48

## 2017-08-18 RX ADMIN — INSULIN GLARGINE 17 UNITS: 100 INJECTION, SOLUTION SUBCUTANEOUS at 09:06

## 2017-08-18 RX ADMIN — DEXTROSE MONOHYDRATE AND SODIUM CHLORIDE 150 ML/HR: 5; .45 INJECTION, SOLUTION INTRAVENOUS at 09:08

## 2017-08-18 RX ADMIN — DEXTROSE MONOHYDRATE AND SODIUM CHLORIDE 150 ML/HR: 5; .9 INJECTION, SOLUTION INTRAVENOUS at 04:00

## 2017-08-18 RX ADMIN — INSULIN LISPRO 2 UNITS: 100 INJECTION, SOLUTION INTRAVENOUS; SUBCUTANEOUS at 17:44

## 2017-08-18 RX ADMIN — Medication 5 ML: at 14:20

## 2017-08-18 NOTE — PROGRESS NOTES
Problem: Falls - Risk of  Goal: *Absence of Falls  Document Hanna Fall Risk and appropriate interventions in the flowsheet.    Outcome: Progressing Towards Goal  Fall Risk Interventions:        Mentation Interventions: Door open when patient unattended     Medication Interventions: Patient to call before getting OOB     Elimination Interventions: Call light in reach, Toileting schedule/hourly rounds

## 2017-08-18 NOTE — PROGRESS NOTES
Problem: Self Care Deficits Care Plan (Adult)  Goal: *Acute Goals and Plan of Care (Insert Text)  1. Patient will complete facial bathing with setup, additional time, verbal cues and adaptive equipment as needed. 2. Patient will complete self feeding 50% of meals with L hand. 3. Patient will tolerate 23 minutes of OT treatment with as neede rest breaks to increase activity tolerance for ADLs. 4. Patient will complete functional transfers with moderate assist and adaptive equipment as needed. Timeframe: 7 visits       OCCUPATIONAL THERAPY: Initial Assessment 8/18/2017  INPATIENT: Hospital Day: 3  [place] / [place] / Product Type: Medicare /      NAME/AGE/GENDER: Jairon Durand is a [de-identified] y.o. female             PRIMARY DIAGNOSIS:  CVA (cerebral vascular accident) ([facility] [address] <principal problem not specified> <principal problem not specified>        ICD-10: Treatment Diagnosis:        · Generalized Muscle Weakness (M62.81)  · Other lack of cordination (R27.8)  · Difficulty in walking, Not elsewhere classified (R26.2)   Precautions/Allergies:        falls, aspiration,  Pcn [penicillins]       ASSESSMENT:      Ms. Michelle Roberts presents supine in bed with HOB elevated to almost 90 degrees, just finishing with SLP bedside swallow. Pt agreeable to OT assessment. On 4L NC Oxygen. R UE is contracted and fairly fixed, reports pain with all ranging. L UE is WLFs, though weak currently, and pt reports she did use it to self feed at her SNF, longtime resident. Pt is oriented to self only. Aphasic and halted speech. Edentulous. SLP recommended 1:1 feeding, pureed and nectar thick liquids, head of bed at 90 degrees. Max assist to sit up to edge of bed and was unable to keep her balance. Returned to supine with head of bed elevated due to just having had food ingested.  Repositioned up in bed with Maximal assist.  Reports she spent most of her time in a wheelchair, but was able to self propel with 1 foot and 1 leg. Needed help with all transfers and ADLs except feeding. Unsure of pt's true prior functional ability. Pt may be functioning below her normal baseline and would benefit from skilled OT to address the functional deficits she presents with in hope to returning her to her prior functional level. Will follow for a short time. This section established at most recent assessment   PROBLEM LIST (Impairments causing functional limitations):  1. Decreased Strength  2. Decreased ADL/Functional Activities  3. Decreased Transfer Abilities  4. Decreased Ambulation Ability/Technique  5. Decreased Balance  6. Decreased Activity Tolerance  7. Decreased Cognition    INTERVENTIONS PLANNED: (Benefits and precautions of occupational therapy have been discussed with the patient.)  1. Activities of daily living training  2. Balance training  3. Therapeutic activity  4. Therapeutic exercise  5. Wheelchair management  6. Safety training      TREATMENT PLAN: Frequency/Duration: Follow patient 2-3x per week to address above goals. Rehabilitation Potential For Stated Goals: FAIR      RECOMMENDED REHABILITATION/EQUIPMENT: (at time of discharge pending progress): Continue Skilled Therapy. 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 The patient was unresponsive in the ER, and she met SIRS criteria. She was admitted for sepsis and possible new CVA. 8/18 - The patient is more alert today. Yelling out that she wants water. Denies pain. Past Medical History/Comorbidities:   Ms. Rashmi Parker  has a past medical history of Calculus of kidney; Diabetes ([address]  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 ([address] (1/26/2016); Hemiplegia affecting dominant side ([facility] [address] (1/26/2016); Hypertension; Mixed hyperlipidemia (1/26/2016); Neuropathy in diabetes ([facility] [address] (1/26/2016); Overactive bladder (1/26/2016); Psychiatric disorder; and Stroke [facility]). Ms. Staci Childress  has a past surgical history that includes orthopaedic (2006) and heent (1950's). Social History/Living Environment:   Home Environment: [address] [place] [address] Name: [employer]  # Steps to Enter: 0  One/Two Story Residence: One story  Living Alone: No  Support Systems: Child(frederic), Family member(s)  Patient Expects to be Discharged to[de-identified] Skilled nursing facility  Current DME Used/Available at Home: [employer], Wheelchair, [address], rollator, Shower chair, Commode, bedside  Tub or Shower Type: Shower  Prior Level of Function/Work/Activity:  long time resident of SNF, suspect needs help with all ADLs, but reports she fed herself, self propelled in wheelchair, non ambulatory  Dominant Side:         LEFT, since R Dominant CVA   Number of Personal Factors/Comorbidities that affect the Plan of Care: Expanded review of therapy/medical records (1-2):  MODERATE COMPLEXITY   ASSESSMENT OF OCCUPATIONAL PERFORMANCE[de-identified]   Activities of Daily Living:           Basic ADLs (From Assessment) Complex ADLs (From Assessment)   Basic ADL  Feeding: Minimum assistance, Additional time (with 1:1 feeding per SLP, 90 degrees)  Oral Facial Hygiene/Grooming: Maximum assistance  Bathing: Maximum assistance  Upper Body Dressing: Maximum assistance  Lower Body Dressing: Total assistance  Toileting: Total assistance, Adaptive equipment (muñoz in place) Instrumental ADL  Meal Preparation: Total assistance  Homemaking:  Total assistance  Medication Management: Total assistance  Financial Management: Total assistance   Grooming/Bathing/Dressing Activities of Daily Living     Cognitive Retraining  Safety/Judgement: Decreased awareness of environment;Decreased awareness of need for assistance;Decreased awareness of need for safety;Decreased insight into deficits; Fall prevention                 Functional Transfers  Toilet Transfer : Total assistance  Tub Transfer: Total assistance  Shower Transfer: Total assistance     Bed/Mat Mobility  Rolling: Maximum assistance  Supine to Sit: Maximum assistance  Sit to Supine: Maximum assistance  Bed to Chair: Total assistance  Scooting: Total assistance          Most Recent Physical Functioning:   Gross Assessment:  AROM: Grossly decreased, non-functional (R UE, L UE limited shoulder, rest is WFLs)  PROM: Grossly decreased, non-functional (R UE contracted, L UE WFLs)  Strength: Grossly decreased, non-functional (R UE, weak L UE)  Coordination: Grossly decreased, non-functional (R UE, L WFLs)  Tone: Abnormal (R UE, L UE WFLS)  Sensation: Impaired (B hands, R worse than L)               Posture:     Balance:  Sitting: Impaired; With support  Sitting - Static: Fair (occasional)  Sitting - Dynamic: Poor (constant support) Bed Mobility:  Rolling: Maximum assistance  Supine to Sit: Maximum assistance  Sit to Supine: Maximum assistance  Scooting:  Total assistance  Wheelchair Mobility:     Transfers:  Bed to Chair: Total assistance              Patient Vitals for the past 6 hrs:       BP SpO2 O2 Flow Rate (L/min) Pulse   08/18/17 0722 107/57 96 % - 97   08/18/17 0943 - 97 % 2 l/min -        Mental Status  Neurologic State: Alert  Orientation Level: Oriented to person, Disoriented to place, Disoriented to situation, Disoriented to time  Cognition: Decreased attention/concentration, Follows commands  Perception: Cues to attend to right side of body, Cues to maintain midline in sitting, Tactile, Verbal, Visual  Perseveration: No perseveration noted  Safety/Judgement: Decreased awareness of environment, Decreased awareness of need for assistance, Decreased awareness of need for safety, Decreased insight into deficits, Fall prevention Physical Skills Involved:  1. Range of Motion  2. Balance  3. Strength  4. Activity Tolerance  5. Sensation Cognitive Skills Affected (resulting in the inability to perform in a timely and safe manner):  1. Perception  2. Executive Function  3. Sustained Attention  4. Divided Attention  5. Comprehension  6. Expression Psychosocial Skills Affected:  1. Habits/Routines  2. Environmental Adaptation  3. Emotional Regulation  4. Self-Awareness  5. Awareness of Others   Number of elements that affect the Plan of Care: 5+:  HIGH COMPLEXITY   CLINICAL DECISION MAKING:   [facility] AM-PAC 6 Clicks   Daily Activity Inpatient Short Form  How much help from another person does the patient currently need. .. Total A Lot A Little None   1. Putting on and taking off regular lower body clothing? [X] 1   [ ] 2   [ ] 3   [ ] 4   2. Bathing (including washing, rinsing, drying)? [X] 1   [ ] 2   [ ] 3   [ ] 4   3. Toileting, which includes using toilet, bedpan or urinal?   [X] 1   [ ] 2   [ ] 3   [ ] 4   4. Putting on and taking off regular upper body clothing? [X] 1   [ ] 2   [ ] 3   [ ] 4   5. Taking care of personal grooming such as brushing teeth? [X] 1   [ ] 2   [ ] 3   [ ] 4   6. Eating meals? [X] 1   [ ] 2   [ ] 3   [ ] 4   © 2007, Trustees of [facility], under license to [email or web address]. All rights reserved    Score:  Initial: 6, completed 8/18/2017 Most Recent: X (Date: -- )     Interpretation of Tool:  Represents activities that are increasingly more difficult (i.e. Bed mobility, Transfers, Gait).        Score 24 23 22-20 19-15 14-10 9-7 6       Modifier CH CI CJ CK CL CM CN         · Self Care:               - CURRENT STATUS:    CN - 100% impaired, limited or restricted               - GOAL STATUS:           CM - 80%-99% impaired, limited or restricted               - D/C STATUS:                       ---------------To be determined---------------  [place] / Product Type: Medicare /       Medical Necessity:     · Patient is expected to demonstrate progress in coordination and functional technique to increase independence with self feeding and simple grooming tasks. Reason for Services/Other Comments:  · Patient continues to require skilled intervention due to s/p above and decreased simple ADLs and mobility. Use of outcome tool(s) and clinical judgement create a POC that gives a: LOW COMPLEXITY             TREATMENT:   (In addition to Assessment/Re-Assessment sessions the following treatments were rendered)      Pre-treatment Symptoms/Complaints:  Agreeable to OT assessment  Pain: Initial:   Pain Intensity 1: 0 (only reported pain when OT tried to PROM R UE)  Post Session:  same      Assessment/Reassessment only, no treatment provided today     Braces/Orthotics/Lines/Etc:   · O2 Device: Nasal cannula  Treatment/Session Assessment:    · Response to Treatment:  Fair tolerated eval, poor sitting balance, contracted RUE, LUE weak  · Interdisciplinary Collaboration:  · Occupational Therapist  · Registered Nurse  ·   · Certified Nursing Assistant/Patient Care Technician  · After treatment position/precautions:  · Bed/Chair-wheels locked  · Bed in low position  · Call light within reach  · RN notified  · Side rails x 3  · Head of bed elevated to max level s/p feeding with SLP  · Compliance with Program/Exercises: Will assess as treatment progresses. Compliant today. · Recommendations/Intent for next treatment session: \"Next visit will focus on reduction in assistance provided\".   Total Treatment Duration:  30 mins  OT Patient Time In/Time Out  Time In: 0930  Time Out: 1000     HERIBERTO Herrera MS, OTR/L

## 2017-08-18 NOTE — PROGRESS NOTES
LTG: Patient will tolerate least restrictive diet without overt signs or symptoms of airway compromise. STG: Patient will tolerate puree and nectar thick liquids without overt signs or symptoms of airway compromise. STG: Patient will participate in modified barium swallow study as clinically indicated. Speech language pathology: bedside swallow note: Initial Assessment    NAME/AGE/GENDER: Kevin Seth is a [de-identified] y.o. female  DATE: 8/18/2017  PRIMARY DIAGNOSIS: CVA (cerebral vascular accident) [facility])       ICD-10: Treatment Diagnosis: R13.12 Oropharyngeal Dysphagia. INTERDISCIPLINARY COLLABORATION: Registered Nurse  PRECAUTIONS/ALLERGIES: Pcn [penicillins] ASSESSMENT:Based on the objective data described below, Ms. Bronson Gordillo presents with moderate oropharyngeal dysphagia. Patient previously seen by speech therapy in 9/2016 with recommendations of mechanical soft with nectar thick liquids. Patient reports continued use of nectar thick liquids at home. She is currently edentulous and states that she does not consume chewable textures. Patient was presented thin liquid via tsp and cup; nectar via cup and straw; and puree. Patient with delayed swallow initiation on all trials. Immediate coughing following thin liquid via cup. No overt signs or symptoms noted with nectar via straw and puree. Patient declined trials of mixed consistency as she did not feel she would be able to chew texture. Recommend PUREE and NECTAR thick liquids. Medications crushed in puree. Upright seating with all po intake. 1:1 assistance with meals to assist with feeding. ST to follow up x1 to assess for diet tolerance. Patient will benefit from skilled intervention to address the below impairments. ?????? ? ? This section established at most recent assessment??????????  PROBLEM LIST (Impairments causing functional limitations):  1.  Oropharyngeal dysphagia  REHABILITATION POTENTIAL FOR STATED GOALS: Fair  PLAN OF CARE:   Patient will benefit from skilled intervention to address the following impairments. RECOMMENDATIONS AND PLANNED INTERVENTIONS (Benefits and precautions of therapy have been discussed with the patient.):  · PO:  Pureed  · Liquids:  nectar  MEDICATIONS:  · Crushed in puree  COMPENSATORY STRATEGIES/MODIFICATIONS INCLUDING:  · Alternate liquids/solids  · Small sips and bites  OTHER RECOMMENDATIONS (including follow up treatment recommendations):   · Patient education  RECOMMENDED DIET MODIFICATIONS DISCUSSED WITH:  · Nursing  · Patient  FREQUENCY/DURATION: Continue to follow patient 3 times a week for duration of hospital stay to address above goals. RECOMMENDED REHABILITATION/EQUIPMENT: (at time of discharge pending progress):   Continue Skilled Therapy. SUBJECTIVE:   Cooperative, pleasant  History of Present Injury/Illness: Ms. 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 .  She also  has a past surgical history that includes orthopaedic (2006) and heent (1950's). Present Symptoms: Coughing with thin liquids    Pain Intensity 1: 0  Current Medications:   No current facility-administered medications on file prior to encounter. Current Outpatient Prescriptions on File Prior to Encounter   Medication Sig Dispense Refill    atorvastatin (LIPITOR) 80 mg tablet 1 Tab by Per G Tube route nightly. 30 Tab 0    metoprolol succinate (TOPROL-XL) 100 mg tablet Take 1 Tab by mouth daily. 30 Tab 0    lisinopril (PRINIVIL, ZESTRIL) 40 mg tablet Take 1 Tab by mouth daily. 30 Tab 0    insulin glargine (LANTUS) 100 unit/mL injection 24 Units by SubCUTAneous route daily.  2 Vial 2    DULoxetine (CYMBALTA) 60 mg capsule Take 1 Cap by mouth daily. 80 Cap 2     Current Dietary Status:  NPO      Social History/Home Situation:    Home Environment: [address] [facility] Name: [place]  One/Two Story Residence: One story  Living Alone: No  Support Systems: Child(frederic), Family member(s)  Patient Expects to be Discharged to[de-identified] Skilled nursing facility  Current DME Used/Available at Home: Wheelchair, Walker, rollator, [address] chair, Grab bars, Commode, bedside  OBJECTIVE:   Respiratory Status:        CXR Results: No pulmonary consolidation  MRI/CT Results:1. Chronic small vessel changes. Generalized cerebral volume loss, age-related. Findings are unchanged compared to prior exam.   2. No evidence of acute intracranial abnormality  Oral Motor Structure/Speech:  Oral-Motor Structure/Motor Speech  Labial: Decreased rate, Right droop, Decreased seal  Dentition: Edentulous  Oral Hygiene: Adequate  Lingual: Decreased rate, Decreased strength, Right deviation    Cognitive and Communication Status:  Neurologic State: Alert  Orientation Level: Oriented to person;Disoriented to situation;Disoriented to place; Disoriented to time  Cognition: Follows commands     Perseveration: No perseveration noted       BEDSIDE SWALLOW EVALUATION  Oral Assessment:  Oral Assessment  Labial: Decreased rate;Right droop; Decreased seal  Dentition: Edentulous  Oral Hygiene: Adequate  Lingual: Decreased rate;Decreased strength;Right deviation  P.O. Trials:  Patient Position: Upright in bed    The patient was given tsp-small bites amounts of the following:   Consistency Presented: Thin liquid; Nectar thick liquid;Puree  How Presented: SLP-fed/presented;Cup/sip;Straw;Spoon    ORAL PHASE:  Bolus Acceptance: No impairment  Bolus Formation/Control: Impaired  Propulsion: Delayed (# of seconds)  Type of Impairment: Anterior;Spillage;Delayed  Oral Residue: None    PHARYNGEAL PHASE:  Initiation of Swallow: Delayed (# of seconds)  Laryngeal Elevation: Functional  Aspiration Signs/Symptoms: Strong cough  Vocal Quality: Low volume           Pharyngeal Phase Characteristics: Easily fatigued     OTHER OBSERVATIONS:  Rate/bite size: Impaired   Endurance:  Impaired   Comments: Tool Used: Dysphagia Outcome and Severity Scale (MARCELO)    Score Comments   Normal Diet  [] 7 With no strategies or extra time needed   Functional Swallow  [] 6 May have mild oral or pharyngeal delay       Mild Dysphagia    [] 5 Which may require one diet consistency restricted (those who demonstrate penetration which is entirely cleared on MBS would be included)   Mild-Moderate Dysphagia  [] 4 With 1-2 diet consistencies restricted       Moderate Dysphagia  [x] 3 With 2 or more diet consistencies restricted       Moderately Severe Dysphagia  [] 2 With partial PO strategies (trials with ST only)       Severe Dysphagia  [] 1 With inability to tolerate any PO safely          Score:  Initial: 3 Most Recent: X (Date: -- )   Interpretation of Tool: The Dysphagia Outcome and Severity Scale (MARCELO) is a simple, easy-to-use, 7-point scale developed to systematically rate the functional severity of dysphagia based on objective assessment and make recommendations for diet level, independence level, and type of nutrition. Score 7 6 5 4 3 2 1   Modifier CH CI CJ CK CL CM CN   ?  Swallowing:     - CURRENT STATUS: CL - 60%-79% impaired, limited or restricted    - GOAL STATUS:  CL - 60%-79% impaired, limited or restricted    - D/C STATUS:  ---------------To be determined---------------  [place] / Product Type: Medicare /     TREATMENT:    (In addition to Assessment/Re-Assessment sessions the following treatments were rendered)  Assessment/Reassessment only, no treatment provided today  MODALITIES:                                                                    ORAL MOTOR  EXERCISES: LARYNGEAL / PHARYNGEAL EXERCISES:                                                                                                                                     __________________________________________________________________________________________________  Safety:   After treatment position/precautions:  · Upright in Bed. Progression/Medical Necessity:   · Patient is expected to demonstrate progress in swallow safety to improve swallow safety and decrease aspiration risk. Compliance with Program/Exercises: Will assess as treatment progresses. Reason for Continuation of Services/Other Comments:  · Patient continues to require skilled intervention due to dysphagia. Recommendations/Intent for next treatment session: \"Treatment next visit will focus on diet tolerance. \".     Total Treatment Duration:  Time In: 0915  Time Out: 0930    ENRIQUE Sunshine, CCC-SLP, CBIS

## 2017-08-18 NOTE — PROGRESS NOTES
Hospitalist Progress Note    Patient: Alexandria Greer MRN: [id number]  SSN: [id number]    YOB: 1935  Age: [de-identified] y.o. 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 The patient was unresponsive in the ER, and she met SIRS criteria. She was admitted for sepsis and possible new CVA. 8/18 - The patient is more alert today. Yelling out that she wants water. Denies pain. Review of systems negative except stated above. Objective:     Visit Vitals    /41    Pulse 78    Temp 98.5 °F (36.9 °C)    Resp 18    Ht 5' 7\" (1.702 m)    Wt 85.7 kg (189 lb)    SpO2 99%    BMI 29.6 kg/m2      Oxygen Therapy  O2 Sat (%): 99 % (08/18/17 0410)  Pulse via Oximetry: 99 beats per minute (08/17/17 0405)  O2 Device: Nasal cannula (08/17/17 0547)      Intake and Output:   Intake/Output Summary (Last 24 hours) at 08/18/17 0751  Last data filed at 08/18/17 0410   Gross per 24 hour   Intake             2802 ml   Output              550 ml   Net             2252 ml         Physical Exam:   GENERAL: Alert, cooperative, no distress, appears stated age  EYE: conjunctivae/corneas clear. PERRL. THROAT & NECK: normal and no erythema or exudates noted. LUNG: clear to auscultation bilaterally  HEART: regular rate and rhythm, S1S2, no murmur, no JVD  ABDOMEN: soft, non-tender, non-distended. Bowel sounds normal.   EXTREMITIES:  No edema, 2+ pedal/radial pulses bilaterally  SKIN: no rash or abnormalities  NEUROLOGIC: Alert. Hard to understand speech. Cranial nerves 2-12 grossly intact.     Lab/Data Review:  Recent Results (from the past 24 hour(s))   GLUCOSE, POC    Collection Time: 08/17/17  8:19 AM   Result Value Ref Range    Glucose (POC) 166 (H) 65 - 100 mg/dL   GLUCOSE, POC Collection Time: 08/17/17 11:22 AM   Result Value Ref Range    Glucose (POC) 178 (H) 65 - 100 mg/dL   LACTIC ACID    Collection Time: 08/17/17 12:19 PM   Result Value Ref Range    Lactic acid 3.2 (HH) 0.4 - 2.0 MMOL/L   GLUCOSE, POC    Collection Time: 08/17/17  5:39 PM   Result Value Ref Range    Glucose (POC) 194 (H) 65 - 100 mg/dL   LACTIC ACID    Collection Time: 08/17/17  6:05 PM   Result Value Ref Range    Lactic acid 3.3 (HH) 0.4 - 2.0 MMOL/L   GLUCOSE, POC    Collection Time: 08/17/17  9:22 PM   Result Value Ref Range    Glucose (POC) 188 (H) 65 - 100 mg/dL   GLUCOSE, POC    Collection Time: 08/18/17  5:50 AM   Result Value Ref Range    Glucose (POC) 111 (H) 65 - 572 mg/dL   METABOLIC PANEL, BASIC    Collection Time: 08/18/17  6:14 AM   Result Value Ref Range    Sodium 151 (H) 136 - 145 mmol/L    Potassium 3.8 3.5 - 5.1 mmol/L    Chloride 119 (H) 98 - 107 mmol/L    CO2 23 21 - 32 mmol/L    Anion gap 9 7 - 16 mmol/L    Glucose 125 (H) 65 - 100 mg/dL    BUN 33 (H) 8 - 23 MG/DL    Creatinine 1.67 (H) 0.6 - 1.0 MG/DL    GFR est AA 38 (L) >60 ml/min/1.73m2    GFR est non-AA 31 (L) >60 ml/min/1.73m2    Calcium 7.9 (L) 8.3 - 10.4 MG/DL   CBC WITH AUTOMATED DIFF    Collection Time: 08/18/17  6:14 AM   Result Value Ref Range    WBC 25.4 (H) 4.3 - 11.1 K/uL    RBC 4.33 4.05 - 5.25 M/uL    HGB 12.2 11.7 - 15.4 g/dL    HCT 38.5 35.8 - 46.3 %    MCV 88.9 79.6 - 97.8 FL    MCH 28.2 26.1 - 32.9 PG    MCHC 31.7 31.4 - 35.0 g/dL    RDW 14.1 11.9 - 14.6 %    PLATELET 657 520 - 658 K/uL    MPV 11.4 10.8 - 14.1 FL    DF AUTOMATED      NEUTROPHILS 97 (H) 43 - 78 %    LYMPHOCYTES 2 (L) 13 - 44 %    MONOCYTES 0 (L) 4.0 - 12.0 %    EOSINOPHILS 0 (L) 0.5 - 7.8 %    BASOPHILS 0 0.0 - 2.0 %    IMMATURE GRANULOCYTES 0.9 0.0 - 5.0 %    ABS. NEUTROPHILS 24.6 (H) 1.7 - 8.2 K/UL    ABS. LYMPHOCYTES 0.5 0.5 - 4.6 K/UL    ABS. MONOCYTES 0.1 0.1 - 1.3 K/UL    ABS. EOSINOPHILS 0.0 0.0 - 0.8 K/UL    ABS. BASOPHILS 0.0 0.0 - 0.2 K/UL    ABS. IMM. Cara Mends. 0.2 0.0 - 0.5 K/UL   PROTHROMBIN TIME + INR    Collection Time: 08/18/17  6:14 AM   Result Value Ref Range    Prothrombin time 12.5 (H) 9.6 - 12.0 sec    INR 1.1 0.9 - 1.2     LACTIC ACID    Collection Time: 08/18/17  6:14 AM   Result Value Ref Range    Lactic acid 2.3 (HH) 0.4 - 2.0 MMOL/L       Imaging:  Ct Head Wo Cont    Result Date: 8/17/2017  IMPRESSION: 1. Chronic small vessel changes. Generalized cerebral volume loss, age-related. Findings are unchanged compared to prior exam. 2. No evidence of acute intracranial abnormality. Ct Head Wo Cont    Result Date: 8/16/2017  IMPRESSION: 1. No CT evidence of acute territorial infarction. Diffuse chronic small vessel changes diminish sensitivity for detection of small acute/subacute infarct. MRI is more sensitive. 2. A 3 mm focus of hyperdensity in the right basal ganglia region, likely due to calcification or less likely small focus of hemorrhage. Xr Chest Port    Result Date: 8/16/2017  IMPRESSION: No pulmonary consolidation. Cultures:   All Micro Results     Procedure Component Value Units Date/Time    CULTURE, BLOOD [[id number]]     Order Status:  Sent Specimen:  Blood from Blood     CULTURE, BLOOD [[id number]]     Order Status:  Sent Specimen:  Blood from Blood     CULTURE, URINE [[id number]]  (Abnormal) Collected:  08/17/17 0214    Order Status:  Completed Specimen:  Urine from Clean catch Updated:  08/18/17 0740     Special Requests: NO SPECIAL REQUESTS        Culture result:         >100,000 COLONIES/mL GRAM NEGATIVE RODS SUBCULTURE IN PROGRESS (A)    CULTURE, BLOOD [[id number]] Collected:  08/16/17 2155    Order Status:  Completed Specimen:  Whole Blood from Blood Updated:  08/18/17 0700     Special Requests:  l hand     GRAM STAIN GRAM POSITIVE COCCI                 AEROBIC AND ANAEROBIC BOTTLES              RESULTS VERIFIED, PHONED TO AND READ BACK BY Sylvester Boland RN @1254 8/17/17 ES     Culture result:         CULTURE IN PROGRESS,FURTHER UPDATES TO FOLLOW              SA target DNA sequence not detected within the sample. Test performed by PCR    CULTURE, BLOOD [[id number]] Collected:  08/16/17 2200    Order Status:  Completed Specimen:  Whole Blood from Blood Updated:  08/18/17 0646     Special Requests: l arm     Culture result: NO GROWTH 1 DAY       MRSA SCREEN - PCR (NASAL) [[id number]] Collected:  08/17/17 0530    Order Status:  Completed Specimen:  Nasal from Nares Updated:  08/17/17 0900     Special Requests: NO SPECIAL REQUESTS        Culture result:         MRSA target DNA is not detected (presumptive not colonized with MRSA)          Assessment & Plan:     1. Severe Sepsis - Improving. Likely from #2. Continue Cefepime. 2. GNR UTI - Jeong in place. Will remove once more alert. Continue Cefepime. 3. GPC in 1/2 blood cultures - I suspect contaminant. Will continue Cefepime. MRSA DNA not detected so no Vancomycin. Repeat blood cultures this AM.    4. Metabolic Encephalopathy - Resolved. Secondary to #1 & #2. Monitor. 5. Facial droop with aphasia - None this AM. Probably secondary to #1 & #2. Will monitor. No MRI for now. 6. H/O CVAs - with hemplegia. Continue Lipitor if taking PO.    7. Hypernatremia - likely from normal saline - will change to 0.45% saline. Repeat BMP in AM.    8. Diabetes Type 2 - -194 x 24 hours. Continue D5 until taking PO. Lantus 17U QHS. Humalog SSI.     DIET NPO    Signed By: Edwin Burkett DO     August 18, 2017

## 2017-08-18 NOTE — PROGRESS NOTES
END OF SHIFT NOTE:    INTAKE/OUTPUT  08/17 0701 - 08/18 0700  In: 2996 [I.V.:2996]  Out: 550 [Urine:550]  Voiding: NO  Catheter: YES  Drain:              Flatus: Patient does have flatus present. Stool:  1 occurrences. Characteristics:  Stool Assessment  Stool Color: Brown  Stool Appearance: Soft  Stool Amount: Small  Stool Source/Status: Rectum    Emesis: 0 occurrences. Characteristics:        VITAL SIGNS  Patient Vitals for the past 12 hrs:   Temp Pulse Resp BP SpO2   08/18/17 1925 98.4 °F (36.9 °C) 79 16 117/70 97 %   08/18/17 1530 98.3 °F (36.8 °C) 78 16 104/62 100 %   08/18/17 1116 98.1 °F (36.7 °C) 83 16 95/65 97 %   08/18/17 0943 - - - - 97 %       Pain Assessment  Pain Intensity 1: 0 (08/18/17 1420)        Patient Stated Pain Goal: Unable to verbalize/indicatate pain    Ambulating  No    Shift report given to oncoming nurse at the bedside.     Melina Clark RN

## 2017-08-18 NOTE — PROGRESS NOTES
Spoke with Ms. Dobson Duy in room 204 about discharge planning. She was drowsy, and somewhat hard to understand. She spelled out the name of a town in [place] where she says she lives alone. [place] [address] at [phone] and confirmed she is a long-term resident there, on she is on a 10 day Medicaid bed-hold.

## 2017-08-18 NOTE — PROGRESS NOTES
Problem: Mobility Impaired (Adult and Pediatric)  Goal: *Acute Goals and Plan of Care (Insert Text)  1. Ms. Roxanne Roberts will perform supine to sit and sit to supine with mod assist of 1 in 7 days. 2. Ms. Roxanne Roberts will sit edge of bed with mod assist in 7 days. PHYSICAL THERAPY: INITIAL ASSESSMENT 8/18/2017  INPATIENT: Hospital Day: 3  [place] / [place] / Product Type: Medicare /      NAME/AGE/GENDER: Hans Wahl is a [de-identified] y.o. female             PRIMARY DIAGNOSIS: CVA (cerebral vascular accident) ([facility] [address] <principal problem not specified> <principal problem not specified>        ICD-10: Treatment Diagnosis:       · Generalized Muscle Weakness (M62.81)   Precaution/Allergies:  Pcn [penicillins]       ASSESSMENT:      Ms. Roxanne Roberts presents extremely debilitated. She has severe tone in bilateral lower extremities. So much so this morning that this PT was unable to get her to the edge of the bed safely. From what has been read she was able be up in a w/c and performed stand pivot. Not an option today. She is very pleasant and confused. Anticipate return to facility. Presume she is functioning below baseline. Therefore she is appropriate for skilled PT to maximize her rehab potential.      This section established at most recent assessment   PROBLEM LIST (Impairments causing functional limitations):  1. Decreased Strength  2. Decreased Transfer Abilities  3. Decreased Flexibility/Joint Mobility    INTERVENTIONS PLANNED: (Benefits and precautions of physical therapy have been discussed with the patient.)  1. Bed Mobility  2. Therapeutic Activites  3. Therapeutic Exercise/Strengthening  4. Transfer Training      TREATMENT PLAN: Frequency/Duration: 3 times a week for duration of hospital stay  Rehabilitation Potential For Stated Goals: GUARDED      RECOMMENDED REHABILITATION/EQUIPMENT: (at time of discharge pending progress): Continue Skilled Therapy.                    HISTORY: History of Present Injury/Illness (Reason for Referral): Lalito Lucero is a [de-identified] y.o. female who presents with confusion, right-sided facial droop, and aphasia. She is accompanied by her daughter and granddaughter in ED at time of exam. Daughter reports being notified of patient's change in status earlier today from her caregiver. Patient has past history of multiple strokes, placement of DVT filter, and strong family history of CVA  Past Medical History/Comorbidities:   Ms. Cristian Ferguson  has a past medical history of Calculus of kidney; Diabetes ([facility] [address] 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 ([facility] [address] (1/26/2016); Hemiplegia affecting dominant side ([facility] [address] (1/26/2016); Hypertension; Mixed hyperlipidemia (1/26/2016); Neuropathy in diabetes ([facility] [address] (1/26/2016); Overactive bladder (1/26/2016); Psychiatric disorder; and Stroke [facility]). Ms. Cristian Ferguson  has a past surgical history that includes orthopaedic (2006) and heent (1950's). Social History/Living Environment:   Home Environment: [address] [place] [address] Name: manna  # Steps to Enter: 0  One/Two Story Residence: One story  Living Alone: No  Support Systems: Child(frederic), Skilled nursing facility  Patient Expects to be Discharged to[de-identified] Skilled nursing facility  Current DME Used/Available at Home: Wheelchair  Tub or Shower Type: Shower  Prior Level of Function/Work/Activity:  NH care, assist to w/c stand pivot per chart  age, multiple CVAs.    Number of Personal Factors/Comorbidities that affect the Plan of Care: 1-2: MODERATE COMPLEXITY   EXAMINATION:   Most Recent Physical Functioning:   Gross Assessment:  AROM: Grossly decreased, non-functional  PROM: Grossly decreased, non-functional  Strength: Grossly decreased, non-functional  Coordination: Grossly decreased, non-functional  Tone: Abnormal (significant tone bilateral lower extremities)  Sensation: Impaired               Posture:     Balance:  Sitting:  (unable to get to sittng) Bed Mobility:  Rolling: Total assistance  Supine to Sit:  (total and due to tone unable to get to edge of bed.)  Wheelchair Mobility:     Transfers:     Gait:             Body Structures Involved:  1. Muscles Body Functions Affected:  1. Movement Related Activities and Participation Affected:  1. Mobility   Number of elements that affect the Plan of Care: 3: MODERATE COMPLEXITY   CLINICAL PRESENTATION:   Presentation: Evolving clinical presentation with changing clinical characteristics: MODERATE COMPLEXITY   CLINICAL DECISION MAKIN [facility] Inpatient Short Form  How much difficulty does the patient currently have. .. Unable A Lot A Little None   1. Turning over in bed (including adjusting bedclothes, sheets and blankets)? [ ] 1   [X] 2   [ ] 3   [ ] 4   2. Sitting down on and standing up from a chair with arms ( e.g., wheelchair, bedside commode, etc.)   [X] 1   [ ] 2   [ ] 3   [ ] 4   3. Moving from lying on back to sitting on the side of the bed? [X] 1   [ ] 2   [ ] 3   [ ] 4   How much help from another person does the patient currently need. .. Total A Lot A Little None   4. Moving to and from a bed to a chair (including a wheelchair)? [X] 1   [ ] 2   [ ] 3   [ ] 4   5. Need to walk in hospital room? [X] 1   [ ] 2   [ ] 3   [ ] 4   6. Climbing 3-5 steps with a railing? [X] 1   [ ] 2   [ ] 3   [ ] 4   © , Trustees of [address] [place] [address], under license to [employer]. All rights reserved    Score:  Initial: 7 Most Recent: X (Date: -- )     Interpretation of Tool:  Represents activities that are increasingly more difficult (i.e. Bed mobility, Transfers, Gait).        Score 24 23 22-20 19-15 14-10 9-7 6       Modifier CH CI CJ CK CL CM CN         · Mobility - Walking and Moving Around:               - CURRENT STATUS:    CM - 80%-99% impaired, limited or restricted               - GOAL STATUS:           CM - 80%-99% impaired, limited or restricted               - D/C STATUS:                       ---------------To be determined---------------  [place] / Product Type: Medicare /       Medical Necessity:     · Patient is expected to demonstrate progress in functional technique to decrease assistance required with mobility. Reason for Services/Other Comments:  · Patient continues to require present interventions due to patient's inability to function at baseline. .   Use of outcome tool(s) and clinical judgement create a POC that gives a: Questionable prediction of patient's progress: MODERATE COMPLEXITY                 TREATMENT:   (In addition to Assessment/Re-Assessment sessions the following treatments were rendered)   Pre-treatment Symptoms/Complaints:  none  Pain: Initial:   Pain Intensity 1: 0  Post Session:  none      Assessment/Reassessment only, no treatment provided today     Braces/Orthotics/Lines/Etc:   · muñoz catheter  Treatment/Session Assessment:    · Response to Treatment:  fair  · Interdisciplinary Collaboration:  · Registered Nurse  · After treatment position/precautions:  · Supine in bed  · Call light within reach  · RN notified  · Compliance with Program/Exercises: Will assess as treatment progresses. · Recommendations/Intent for next treatment session: \"Next visit will focus on advancements to more challenging activities and reduction in assistance provided\".   Total Treatment Duration:  PT Patient Time In/Time Out  Time In: 1120  Time Out: [address], PT

## 2017-08-18 NOTE — PROGRESS NOTES
END OF SHIFT NOTE:    INTAKE/OUTPUT  08/17 0701 - 08/18 0700  In: 2996 [I.V.:2996]  Out: 550 [Urine:550]  Voiding: NO  Catheter: YES  Drain:              Flatus: Patient does have flatus present. Stool:  0 occurrences. Characteristics:  Stool Assessment  Stool Color: Brown  Stool Appearance: Loose  Stool Amount: Small  Stool Source/Status: Rectum    Emesis: 0 occurrences. Characteristics:        VITAL SIGNS  Patient Vitals for the past 12 hrs:   Temp Pulse Resp BP SpO2   08/18/17 0410 98.5 °F (36.9 °C) 78 18 117/41 99 %   08/18/17 0010 99.2 °F (37.3 °C) 84 16 113/62 100 %   08/17/17 1900 99.1 °F (37.3 °C) 83 16 100/61 100 %       Pain Assessment  Pain Intensity 1: 0 (08/18/17 0130)        Patient Stated Pain Goal: Unable to verbalize/indicatate pain    Ambulating  No    Shift report given to oncoming nurse at the bedside.     Alice Yadav RN

## 2017-08-18 NOTE — PROGRESS NOTES
Spoke with Dr. Yaya Castrejon re: low urinary output via muñoz. Current IVF D5 1/2 NS infusing at 150 cc/hr. MD stated to continue to monitor urine output. No new orders at present.

## 2017-08-18 NOTE — PROGRESS NOTES
Critical lab was called from [facility] in Lab. Positive Aerobic and Anaerobic Gram positive cocci. MD notified via telephone, new ordered received.

## 2017-08-19 LAB
ANION GAP SERPL CALC-SCNC: 11 MMOL/L (ref 7–16)
BASOPHILS # BLD: 0 K/UL (ref 0–0.2)
BASOPHILS NFR BLD: 0 % (ref 0–2)
BUN SERPL-MCNC: 29 MG/DL (ref 8–23)
CALCIUM SERPL-MCNC: 7.7 MG/DL (ref 8.3–10.4)
CHLORIDE SERPL-SCNC: 114 MMOL/L (ref 98–107)
CO2 SERPL-SCNC: 21 MMOL/L (ref 21–32)
CREAT SERPL-MCNC: 1.37 MG/DL (ref 0.6–1)
DIFFERENTIAL METHOD BLD: ABNORMAL
EOSINOPHIL # BLD: 0.1 K/UL (ref 0–0.8)
EOSINOPHIL NFR BLD: 1 % (ref 0.5–7.8)
ERYTHROCYTE [DISTWIDTH] IN BLOOD BY AUTOMATED COUNT: 13.9 % (ref 11.9–14.6)
GLUCOSE BLD STRIP.AUTO-MCNC: 125 MG/DL (ref 65–100)
GLUCOSE BLD STRIP.AUTO-MCNC: 158 MG/DL (ref 65–100)
GLUCOSE BLD STRIP.AUTO-MCNC: 163 MG/DL (ref 65–100)
GLUCOSE BLD STRIP.AUTO-MCNC: 82 MG/DL (ref 65–100)
GLUCOSE SERPL-MCNC: 73 MG/DL (ref 65–100)
HCT VFR BLD AUTO: 35.2 % (ref 35.8–46.3)
HGB BLD-MCNC: 11.3 G/DL (ref 11.7–15.4)
IMM GRANULOCYTES # BLD: 0.3 K/UL (ref 0–0.5)
IMM GRANULOCYTES NFR BLD: 1.1 % (ref 0–5)
LYMPHOCYTES # BLD: 1.1 K/UL (ref 0.5–4.6)
LYMPHOCYTES NFR BLD: 4 % (ref 13–44)
MCH RBC QN AUTO: 28.1 PG (ref 26.1–32.9)
MCHC RBC AUTO-ENTMCNC: 32.1 G/DL (ref 31.4–35)
MCV RBC AUTO: 87.6 FL (ref 79.6–97.8)
MM INDURATION POC: NORMAL MM (ref 0–5)
MONOCYTES # BLD: 0.9 K/UL (ref 0.1–1.3)
MONOCYTES NFR BLD: 3 % (ref 4–12)
NEUTS SEG # BLD: 23.2 K/UL (ref 1.7–8.2)
NEUTS SEG NFR BLD: 91 % (ref 43–78)
PLATELET # BLD AUTO: 160 K/UL (ref 150–450)
PMV BLD AUTO: 11.7 FL (ref 10.8–14.1)
POTASSIUM SERPL-SCNC: 3.5 MMOL/L (ref 3.5–5.1)
PPD POC: NORMAL NEGATIVE
RBC # BLD AUTO: 4.02 M/UL (ref 4.05–5.25)
SODIUM SERPL-SCNC: 146 MMOL/L (ref 136–145)
WBC # BLD AUTO: 25.6 K/UL (ref 4.3–11.1)

## 2017-08-19 PROCEDURE — 80048 BASIC METABOLIC PNL TOTAL CA: CPT | Performed by: INTERNAL MEDICINE

## 2017-08-19 PROCEDURE — 74011000258 HC RX REV CODE- 258: Performed by: INTERNAL MEDICINE

## 2017-08-19 PROCEDURE — 85025 COMPLETE CBC W/AUTO DIFF WBC: CPT | Performed by: INTERNAL MEDICINE

## 2017-08-19 PROCEDURE — 82962 GLUCOSE BLOOD TEST: CPT

## 2017-08-19 PROCEDURE — 36415 COLL VENOUS BLD VENIPUNCTURE: CPT | Performed by: INTERNAL MEDICINE

## 2017-08-19 PROCEDURE — 65660000000 HC RM CCU STEPDOWN

## 2017-08-19 PROCEDURE — 74011636637 HC RX REV CODE- 636/637: Performed by: INTERNAL MEDICINE

## 2017-08-19 PROCEDURE — 74011250636 HC RX REV CODE- 250/636: Performed by: INTERNAL MEDICINE

## 2017-08-19 RX ADMIN — INSULIN GLARGINE 17 UNITS: 100 INJECTION, SOLUTION SUBCUTANEOUS at 08:22

## 2017-08-19 RX ADMIN — INSULIN LISPRO 2 UNITS: 100 INJECTION, SOLUTION INTRAVENOUS; SUBCUTANEOUS at 00:44

## 2017-08-19 RX ADMIN — DEXTROSE MONOHYDRATE AND SODIUM CHLORIDE 150 ML/HR: 5; .45 INJECTION, SOLUTION INTRAVENOUS at 13:56

## 2017-08-19 RX ADMIN — INSULIN LISPRO 2 UNITS: 100 INJECTION, SOLUTION INTRAVENOUS; SUBCUTANEOUS at 17:31

## 2017-08-19 RX ADMIN — HEPARIN SODIUM 5000 UNITS: 5000 INJECTION, SOLUTION INTRAVENOUS; SUBCUTANEOUS at 13:56

## 2017-08-19 RX ADMIN — CEFEPIME HYDROCHLORIDE 1 G: 1 INJECTION, POWDER, FOR SOLUTION INTRAMUSCULAR; INTRAVENOUS at 22:46

## 2017-08-19 RX ADMIN — Medication 5 ML: at 13:56

## 2017-08-19 RX ADMIN — HEPARIN SODIUM 5000 UNITS: 5000 INJECTION, SOLUTION INTRAVENOUS; SUBCUTANEOUS at 22:45

## 2017-08-19 RX ADMIN — HEPARIN SODIUM 5000 UNITS: 5000 INJECTION, SOLUTION INTRAVENOUS; SUBCUTANEOUS at 06:16

## 2017-08-19 NOTE — PROGRESS NOTES
Hospitalist Progress Note    Patient: Kevin Seth MRN: [id number]  SSN: [id number]    YOB: 1935  Age: [de-identified] y.o. 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 The patient was unresponsive in the ER, and she met SIRS criteria. She was admitted for sepsis and possible new CVA. 8/18 - The patient is more alert today. Yelling out that she wants water. Denies pain. 8/19 - The patient is complaining about her diet. She wants water. Denies pain. Denies CP/SOB. Review of systems negative except stated above. Objective:     Visit Vitals    /78 (BP 1 Location: Left arm, BP Patient Position: At rest)    Pulse 90    Temp 97.6 °F (36.4 °C)    Resp 16    Ht 5' 7\" (1.702 m)    Wt 85.7 kg (189 lb)    SpO2 95%    BMI 29.6 kg/m2      Oxygen Therapy  O2 Sat (%): 95 % (08/19/17 0230)  Pulse via Oximetry: 99 beats per minute (08/17/17 0405)  O2 Device: Nasal cannula (08/18/17 0943)  O2 Flow Rate (L/min): 2 l/min (08/18/17 0943)      Intake and Output:     Intake/Output Summary (Last 24 hours) at 08/19/17 0805  Last data filed at 08/19/17 0230   Gross per 24 hour   Intake             3389 ml   Output              450 ml   Net             2939 ml         Physical Exam:   GENERAL: Alert, cooperative, no distress, appears stated age  EYE: conjunctivae/corneas clear. PERRL. THROAT & NECK: normal and no erythema or exudates noted. LUNG: clear to auscultation bilaterally  HEART: regular rate and rhythm, S1S2, no murmur, no JVD  ABDOMEN: soft, non-tender, non-distended. Bowel sounds normal.   EXTREMITIES:  No edema, 2+ pedal/radial pulses bilaterally  SKIN: no rash or abnormalities  NEUROLOGIC: Alert. Hard to understand speech.  Cranial nerves 2-12 grossly intact.     Lab/Data Review:  Recent Results (from the past 24 hour(s))   CULTURE, BLOOD    Collection Time: 08/18/17  8:20 AM   Result Value Ref Range    Special Requests: LEFT HAND      Culture result: NO GROWTH AFTER 21 HOURS     CULTURE, BLOOD    Collection Time: 08/18/17  8:35 AM   Result Value Ref Range    Special Requests: LEFT HAND      Culture result: NO GROWTH AFTER 21 HOURS     PLEASE READ & DOCUMENT PPD TEST IN 24 HRS    Collection Time: 08/18/17  9:09 AM   Result Value Ref Range    PPD  Negative    mm Induration  0 mm   LACTIC ACID    Collection Time: 08/18/17 12:08 PM   Result Value Ref Range    Lactic acid 1.8 0.4 - 2.0 MMOL/L   GLUCOSE, POC    Collection Time: 08/18/17 12:13 PM   Result Value Ref Range    Glucose (POC) 93 65 - 100 mg/dL   GLUCOSE, POC    Collection Time: 08/18/17  5:09 PM   Result Value Ref Range    Glucose (POC) 175 (H) 65 - 100 mg/dL   GLUCOSE, POC    Collection Time: 08/18/17  9:26 PM   Result Value Ref Range    Glucose (POC) 187 (H) 65 - 100 mg/dL   GLUCOSE, POC    Collection Time: 08/19/17  5:21 AM   Result Value Ref Range    Glucose (POC) 125 (H) 65 - 192 mg/dL   METABOLIC PANEL, BASIC    Collection Time: 08/19/17  5:25 AM   Result Value Ref Range    Sodium 146 (H) 136 - 145 mmol/L    Potassium 3.5 3.5 - 5.1 mmol/L    Chloride 114 (H) 98 - 107 mmol/L    CO2 21 21 - 32 mmol/L    Anion gap 11 7 - 16 mmol/L    Glucose 73 65 - 100 mg/dL    BUN 29 (H) 8 - 23 MG/DL    Creatinine 1.37 (H) 0.6 - 1.0 MG/DL    GFR est AA 48 (L) >60 ml/min/1.73m2    GFR est non-AA 39 (L) >60 ml/min/1.73m2    Calcium 7.7 (L) 8.3 - 10.4 MG/DL   CBC WITH AUTOMATED DIFF    Collection Time: 08/19/17  5:25 AM   Result Value Ref Range    WBC 25.6 (H) 4.3 - 11.1 K/uL    RBC 4.02 (L) 4.05 - 5.25 M/uL    HGB 11.3 (L) 11.7 - 15.4 g/dL    HCT 35.2 (L) 35.8 - 46.3 %    MCV 87.6 79.6 - 97.8 FL    MCH 28.1 26.1 - 32.9 PG    MCHC 32.1 31.4 - 35.0 g/dL    RDW 13.9 11.9 - 14.6 %    PLATELET 507 121 - 225 K/uL    MPV 11.7 10.8 - 14.1 FL    DF AUTOMATED      NEUTROPHILS 91 (H) 43 - 78 %    LYMPHOCYTES 4 (L) 13 - 44 %    MONOCYTES 3 (L) 4.0 - 12.0 %    EOSINOPHILS 1 0.5 - 7.8 %    BASOPHILS 0 0.0 - 2.0 %    IMMATURE GRANULOCYTES 1.1 0.0 - 5.0 %    ABS. NEUTROPHILS 23.2 (H) 1.7 - 8.2 K/UL    ABS. LYMPHOCYTES 1.1 0.5 - 4.6 K/UL    ABS. MONOCYTES 0.9 0.1 - 1.3 K/UL    ABS. EOSINOPHILS 0.1 0.0 - 0.8 K/UL    ABS. BASOPHILS 0.0 0.0 - 0.2 K/UL    ABS. IMM. GRANS. 0.3 0.0 - 0.5 K/UL       Imaging:  Ct Head Wo Cont    Result Date: 8/17/2017  IMPRESSION: 1. Chronic small vessel changes. Generalized cerebral volume loss, age-related. Findings are unchanged compared to prior exam. 2. No evidence of acute intracranial abnormality. Ct Head Wo Cont    Result Date: 8/16/2017  IMPRESSION: 1. No CT evidence of acute territorial infarction. Diffuse chronic small vessel changes diminish sensitivity for detection of small acute/subacute infarct. MRI is more sensitive. 2. A 3 mm focus of hyperdensity in the right basal ganglia region, likely due to calcification or less likely small focus of hemorrhage. Xr Chest Port    Result Date: 8/16/2017  IMPRESSION: No pulmonary consolidation. Cultures:   All Micro Results     Procedure Component Value Units Date/Time    CULTURE, URINE [[id number]]  (Abnormal) Collected:  08/17/17 0214    Order Status:  Completed Specimen:  Urine from Clean catch Updated:  08/19/17 0716     Special Requests: NO SPECIAL REQUESTS        Culture result:         >100,000 COLONIES/mL GRAM NEGATIVE RODS IDENTIFICATION AND SUSCEPTIBILITY TO FOLLOW (A)    CULTURE, BLOOD [[id number]]  (Abnormal) Collected:  08/16/17 2156    Order Status:  Completed Specimen:  Whole Blood from Blood Updated:  08/19/17 0709     Special Requests:  l hand     GRAM STAIN GRAM POSITIVE COCCI                 AEROBIC AND ANAEROBIC BOTTLES              RESULTS VERIFIED, PHONED TO AND READ BACK BY Dany Putnam RN @7632 8/17/17 ES     Culture result: STAPHYLOCOCCUS SPECIES IDENTIFICATION AND SUSCEPTIBILITY TO FOLLOW (A)              SA target DNA sequence not detected within the sample. Test performed by PCR    CULTURE, BLOOD [[id number]] Collected:  08/18/17 0835    Order Status:  Completed Specimen:  Blood from Blood Updated:  08/19/17 0635     Special Requests: LEFT HAND        Culture result: NO GROWTH AFTER 21 HOURS       CULTURE, BLOOD [[id number]] Collected:  08/18/17 0820    Order Status:  Completed Specimen:  Blood from Blood Updated:  08/19/17 0635     Special Requests: LEFT HAND        Culture result: NO GROWTH AFTER 21 HOURS       CULTURE, BLOOD [[id number]] Collected:  08/16/17 2200    Order Status:  Completed Specimen:  Whole Blood from Blood Updated:  08/19/17 0635     Special Requests: l arm     Culture result: NO GROWTH 2 DAYS       MRSA SCREEN - PCR (NASAL) [[id number]] Collected:  08/17/17 0530    Order Status:  Completed Specimen:  Nasal from Nares Updated:  08/17/17 0900     Special Requests: NO SPECIAL REQUESTS        Culture result:         MRSA target DNA is not detected (presumptive not colonized with MRSA)          Assessment & Plan:     1. Severe Sepsis - Improving. Likely from #2. Continue Cefepime. 2. GNR UTI - Jeong in place. Will remove once more alert. Continue Cefepime. 3. GPC in 1/2 blood cultures - I suspect contaminant. Will continue Cefepime. MRSA DNA not detected so no Vancomycin. Repeat blood cultures on 8/18 NGTD. 4. Metabolic Encephalopathy - Resolved. Secondary to #1 & #2. Monitor. 5. Facial droop with aphasia - ? Resolved. Probably secondary to #1 & #2. Will monitor. No MRI for now. 6. H/O CVAs - with hemplegia. Continue Lipitor if taking PO.    7. Hypernatremia - Improved. Likely from normal saline - Continue D5-0.45% saline. Repeat BMP daily. 8. Diabetes Type 2 - -187 x 24 hours. Continue D5 until taking PO. Lantus 17U QHS. Humalog SSI.     DIET DIABETIC CONSISTENT CARB Pureed; 1 NECTAR    Signed By: Kenn Echeverria, DO     August 19, 2017

## 2017-08-19 NOTE — PROGRESS NOTES
END OF SHIFT NOTE:    INTAKE/OUTPUT  08/18 0701 - 08/19 0700  In: 8802 [P.O.:480; I.V.:2909]  Out: 450 [Urine:450]  Voiding: NO  Catheter: YES  Drain:              Flatus: Patient does have flatus present. Stool:  1 occurrences. Characteristics:  Stool Assessment  Stool Color: Brown  Stool Appearance: Loose  Stool Amount: Medium  Stool Source/Status: Rectum    Emesis: 0 occurrences. Characteristics:        VITAL SIGNS  Patient Vitals for the past 12 hrs:   Temp Pulse Resp BP SpO2   08/19/17 1457 99.1 °F (37.3 °C) 94 18 142/82 96 %   08/19/17 1200 98.8 °F (37.1 °C) 89 16 138/74 96 %   08/19/17 0747 99.8 °F (37.7 °C) 86 16 139/84 95 %       Pain Assessment  Pain Intensity 1: 0 (08/19/17 1400)        Patient Stated Pain Goal: Unable to verbalize/indicatate pain    Ambulating  No    Shift report given to oncoming nurse at the bedside.     Diallo Randall RN

## 2017-08-19 NOTE — PROGRESS NOTES
END OF SHIFT NOTE:    INTAKE/OUTPUT  08/18 0701 - 08/19 0700  In: 5124 [P.O.:480; I.V.:2909]  Out: 450 [Urine:450]  Voiding: NO  Catheter: YES  Drain:              Flatus: Patient does have flatus present. Stool:  0 occurrences. Characteristics:  Stool Assessment  Stool Color: Brown  Stool Appearance: Soft  Stool Amount: Small  Stool Source/Status: Rectum    Emesis: 0 occurrences. Characteristics:        VITAL SIGNS  Patient Vitals for the past 12 hrs:   Temp Pulse Resp BP SpO2   08/19/17 0230 97.6 °F (36.4 °C) 90 16 131/78 95 %   08/18/17 2315 98.7 °F (37.1 °C) 84 16 153/85 95 %   08/18/17 1925 98.4 °F (36.9 °C) 79 16 117/70 97 %       Pain Assessment  Pain Intensity 1: 0 (08/18/17 2006)        Patient Stated Pain Goal: Unable to verbalize/indicatate pain    Ambulating  No    Shift report given to oncoming nurse at the bedside.     Emma Campbell RN

## 2017-08-20 ENCOUNTER — APPOINTMENT (OUTPATIENT)
Dept: GENERAL RADIOLOGY | Age: 82
DRG: 871 | End: 2017-08-20
Attending: INTERNAL MEDICINE
Payer: MEDICARE

## 2017-08-20 ENCOUNTER — APPOINTMENT (OUTPATIENT)
Dept: CT IMAGING | Age: 82
DRG: 871 | End: 2017-08-20
Attending: INTERNAL MEDICINE
Payer: MEDICARE

## 2017-08-20 LAB
ANION GAP SERPL CALC-SCNC: 11 MMOL/L (ref 7–16)
ARTERIAL PATENCY WRIST A: POSITIVE
BACTERIA SPEC CULT: ABNORMAL
BASE DEFICIT BLDA-SCNC: 5.6 MMOL/L (ref 0–2)
BASOPHILS # BLD: 0 K/UL (ref 0–0.2)
BASOPHILS # BLD: 0 K/UL (ref 0–0.2)
BASOPHILS NFR BLD: 0 % (ref 0–2)
BASOPHILS NFR BLD: 0 % (ref 0–2)
BDY SITE: ABNORMAL
BUN SERPL-MCNC: 25 MG/DL (ref 8–23)
CALCIUM SERPL-MCNC: 8.3 MG/DL (ref 8.3–10.4)
CHLORIDE SERPL-SCNC: 110 MMOL/L (ref 98–107)
CO2 SERPL-SCNC: 22 MMOL/L (ref 21–32)
COHGB MFR BLD: 1.4 % (ref 0.5–1.5)
CREAT SERPL-MCNC: 1.65 MG/DL (ref 0.6–1)
DIFFERENTIAL METHOD BLD: ABNORMAL
DIFFERENTIAL METHOD BLD: ABNORMAL
DO-HGB BLD-MCNC: 2 % (ref 0–5)
EOSINOPHIL # BLD: 0 K/UL (ref 0–0.8)
EOSINOPHIL # BLD: 0 K/UL (ref 0–0.8)
EOSINOPHIL NFR BLD: 0 % (ref 0.5–7.8)
EOSINOPHIL NFR BLD: 1 % (ref 0.5–7.8)
ERYTHROCYTE [DISTWIDTH] IN BLOOD BY AUTOMATED COUNT: 13.7 % (ref 11.9–14.6)
ERYTHROCYTE [DISTWIDTH] IN BLOOD BY AUTOMATED COUNT: 13.9 % (ref 11.9–14.6)
GAS FLOW.O2 O2 DELIVERY SYS: 5 L/MIN
GLUCOSE BLD STRIP.AUTO-MCNC: 112 MG/DL (ref 65–100)
GLUCOSE BLD STRIP.AUTO-MCNC: 142 MG/DL (ref 65–100)
GLUCOSE BLD STRIP.AUTO-MCNC: 78 MG/DL (ref 65–100)
GLUCOSE BLD STRIP.AUTO-MCNC: 94 MG/DL (ref 65–100)
GLUCOSE BLD STRIP.AUTO-MCNC: 94 MG/DL (ref 65–100)
GLUCOSE SERPL-MCNC: 145 MG/DL (ref 65–100)
GRAM STN SPEC: ABNORMAL
HCO3 BLDA-SCNC: 17 MMOL/L (ref 22–26)
HCT VFR BLD AUTO: 37.3 % (ref 35.8–46.3)
HCT VFR BLD AUTO: 41 % (ref 35.8–46.3)
HGB BLD-MCNC: 11.9 G/DL (ref 11.7–15.4)
HGB BLD-MCNC: 13.4 G/DL (ref 11.7–15.4)
HGB BLDMV-MCNC: 13.3 GM/DL (ref 11.7–15)
IMM GRANULOCYTES # BLD: 0 K/UL (ref 0–0.5)
IMM GRANULOCYTES # BLD: 0.2 K/UL (ref 0–0.5)
IMM GRANULOCYTES NFR BLD: 0.8 % (ref 0–5)
IMM GRANULOCYTES NFR BLD: 1 % (ref 0–5)
LYMPHOCYTES # BLD: 0.6 K/UL (ref 0.5–4.6)
LYMPHOCYTES # BLD: 1.3 K/UL (ref 0.5–4.6)
LYMPHOCYTES NFR BLD: 3 % (ref 13–44)
LYMPHOCYTES NFR BLD: 40 % (ref 13–44)
MCH RBC QN AUTO: 28 PG (ref 26.1–32.9)
MCH RBC QN AUTO: 28.6 PG (ref 26.1–32.9)
MCHC RBC AUTO-ENTMCNC: 31.9 G/DL (ref 31.4–35)
MCHC RBC AUTO-ENTMCNC: 32.7 G/DL (ref 31.4–35)
MCV RBC AUTO: 87.4 FL (ref 79.6–97.8)
MCV RBC AUTO: 87.8 FL (ref 79.6–97.8)
METHGB MFR BLD: 0.1 % (ref 0–1.5)
MM INDURATION POC: NORMAL MM (ref 0–5)
MONOCYTES # BLD: 0 K/UL (ref 0.1–1.3)
MONOCYTES # BLD: 0.6 K/UL (ref 0.1–1.3)
MONOCYTES NFR BLD: 1 % (ref 4–12)
MONOCYTES NFR BLD: 3 % (ref 4–12)
NEUTS SEG # BLD: 1.8 K/UL (ref 1.7–8.2)
NEUTS SEG # BLD: 21.5 K/UL (ref 1.7–8.2)
NEUTS SEG NFR BLD: 57 % (ref 43–78)
NEUTS SEG NFR BLD: 93 % (ref 43–78)
OXYHGB MFR BLDA: 96.1 % (ref 94–97)
PCO2 BLDA: 24 MMHG (ref 35–45)
PH BLDA: 7.45 [PH] (ref 7.35–7.45)
PLATELET # BLD AUTO: 136 K/UL (ref 150–450)
PLATELET # BLD AUTO: 142 K/UL (ref 150–450)
PMV BLD AUTO: 11.8 FL (ref 10.8–14.1)
PMV BLD AUTO: 12.2 FL (ref 10.8–14.1)
PO2 BLDA: 87 MMHG (ref 80–105)
POTASSIUM SERPL-SCNC: 4.1 MMOL/L (ref 3.5–5.1)
PPD POC: NORMAL NEGATIVE
RBC # BLD AUTO: 4.25 M/UL (ref 4.05–5.25)
RBC # BLD AUTO: 4.69 M/UL (ref 4.05–5.25)
SAO2 % BLD: 98 % (ref 92–98.5)
SERVICE CMNT-IMP: ABNORMAL
SERVICE CMNT-IMP: ABNORMAL
SODIUM SERPL-SCNC: 143 MMOL/L (ref 136–145)
WBC # BLD AUTO: 22.9 K/UL (ref 4.3–11.1)
WBC # BLD AUTO: 3.2 K/UL (ref 4.3–11.1)

## 2017-08-20 PROCEDURE — 94760 N-INVAS EAR/PLS OXIMETRY 1: CPT

## 2017-08-20 PROCEDURE — 80048 BASIC METABOLIC PNL TOTAL CA: CPT | Performed by: INTERNAL MEDICINE

## 2017-08-20 PROCEDURE — 76937 US GUIDE VASCULAR ACCESS: CPT

## 2017-08-20 PROCEDURE — 36415 COLL VENOUS BLD VENIPUNCTURE: CPT | Performed by: INTERNAL MEDICINE

## 2017-08-20 PROCEDURE — 74011250637 HC RX REV CODE- 250/637: Performed by: INTERNAL MEDICINE

## 2017-08-20 PROCEDURE — 70450 CT HEAD/BRAIN W/O DYE: CPT

## 2017-08-20 PROCEDURE — 74011250636 HC RX REV CODE- 250/636: Performed by: INTERNAL MEDICINE

## 2017-08-20 PROCEDURE — 82803 BLOOD GASES ANY COMBINATION: CPT

## 2017-08-20 PROCEDURE — 36600 WITHDRAWAL OF ARTERIAL BLOOD: CPT

## 2017-08-20 PROCEDURE — 71010 XR CHEST SNGL V: CPT

## 2017-08-20 PROCEDURE — 65660000000 HC RM CCU STEPDOWN

## 2017-08-20 PROCEDURE — 74011636637 HC RX REV CODE- 636/637: Performed by: INTERNAL MEDICINE

## 2017-08-20 PROCEDURE — 85025 COMPLETE CBC W/AUTO DIFF WBC: CPT | Performed by: INTERNAL MEDICINE

## 2017-08-20 PROCEDURE — 74011000258 HC RX REV CODE- 258: Performed by: INTERNAL MEDICINE

## 2017-08-20 PROCEDURE — 82962 GLUCOSE BLOOD TEST: CPT

## 2017-08-20 RX ORDER — ACETAMINOPHEN 10 MG/ML
1000 INJECTION, SOLUTION INTRAVENOUS
Status: COMPLETED | OUTPATIENT
Start: 2017-08-20 | End: 2017-08-21

## 2017-08-20 RX ORDER — CIPROFLOXACIN 2 MG/ML
400 INJECTION, SOLUTION INTRAVENOUS EVERY 12 HOURS
Status: DISCONTINUED | OUTPATIENT
Start: 2017-08-20 | End: 2017-08-22 | Stop reason: HOSPADM

## 2017-08-20 RX ADMIN — CIPROFLOXACIN 400 MG: 2 INJECTION, SOLUTION INTRAVENOUS at 10:20

## 2017-08-20 RX ADMIN — ACETAMINOPHEN 1000 MG: 10 INJECTION, SOLUTION INTRAVENOUS at 02:00

## 2017-08-20 RX ADMIN — INSULIN LISPRO 2 UNITS: 100 INJECTION, SOLUTION INTRAVENOUS; SUBCUTANEOUS at 01:43

## 2017-08-20 RX ADMIN — CIPROFLOXACIN 400 MG: 2 INJECTION, SOLUTION INTRAVENOUS at 22:19

## 2017-08-20 RX ADMIN — Medication 5 ML: at 07:24

## 2017-08-20 RX ADMIN — HEPARIN SODIUM 5000 UNITS: 5000 INJECTION, SOLUTION INTRAVENOUS; SUBCUTANEOUS at 06:25

## 2017-08-20 RX ADMIN — HEPARIN SODIUM 5000 UNITS: 5000 INJECTION, SOLUTION INTRAVENOUS; SUBCUTANEOUS at 13:21

## 2017-08-20 RX ADMIN — HEPARIN SODIUM 5000 UNITS: 5000 INJECTION, SOLUTION INTRAVENOUS; SUBCUTANEOUS at 22:19

## 2017-08-20 RX ADMIN — Medication 5 ML: at 22:21

## 2017-08-20 RX ADMIN — ATORVASTATIN CALCIUM 40 MG: 40 TABLET, FILM COATED ORAL at 22:19

## 2017-08-20 RX ADMIN — DEXTROSE MONOHYDRATE AND SODIUM CHLORIDE 150 ML/HR: 5; .45 INJECTION, SOLUTION INTRAVENOUS at 07:39

## 2017-08-20 RX ADMIN — DEXTROSE MONOHYDRATE AND SODIUM CHLORIDE 150 ML/HR: 5; .45 INJECTION, SOLUTION INTRAVENOUS at 19:15

## 2017-08-20 NOTE — PROGRESS NOTES
[facility] [address] CRITICAL CARE OUTREACH NURSE PROGRESS REPORT    SUBJECTIVE: Assessed patient secondary to rapid response overnight. MEWS Score: 4 (08/20/17 0333)  Vitals:    08/20/17 0000 08/20/17 0152 08/20/17 0153 08/20/17 0333   BP: (!) 155/91 (!) 155/97  132/69   Pulse: 90 (!) 138 (!) 140 (!) 115   Resp: 20 (!) 36  22   Temp: 99.7 °F (37.6 °C) (!) 103.1 °F (39.5 °C)  100.3 °F (37.9 °C)   SpO2: 97% 99%  96%   Weight:       Height:          LAB DATA:    Recent Labs      08/20/17   0200 08/19/17   0525  08/18/17   0614   NA  143  146*  151*   K  4.1  3.5  3.8   CL  110*  114*  119*   CO2  22  21  23   AGAP  11  11  9   GLU  145*  73  125*   BUN  25*  29*  33*   CREA  1.65*  1.37*  1.67*   GFRAA  38*  48*  38*   GFRNA  32*  39*  31*   CA  8.3  7.7*  7.9*        Recent Labs      08/20/17   0200  08/19/17   0525  08/18/17   0614   WBC  3.2*  25.6*  25.4*   HGB  13.4  11.3*  12.2   HCT  41.0  35.2*  38.5   PLT  142*  160  181          OBJECTIVE: On arrival to room, I found patient to be resting quietly in bed. Pain Assessment  Pain Intensity 1: 0 (08/20/17 0312)        Patient Stated Pain Goal: Unable to verbalize/indicatate pain    ASSESSMENT:  Patient lethargic, eyes open with stimulus, patient mumbles, does not follow commands. Respirations even and unlabored on 3L NC, breath sounds clear. S1 S2 clear. Bowel sounds active. LUE with contractures and limited movement. No apparent distress at this time. PLAN:  Will continue to follow per outreach protocol.

## 2017-08-20 NOTE — PROGRESS NOTES
END OF SHIFT NOTE:    INTAKE/OUTPUT  08/19 0701 - 08/20 0700  In: 4720 [P.O.:540; I.V.:2765]  Out: 1300 [Urine:1300]  Voiding: NO  Catheter: YES  Drain:              Flatus: Patient does have flatus present. Stool:  1 occurrences. Characteristics:  Stool Assessment  Stool Color: Brown  Stool Appearance: Loose  Stool Amount: Medium  Stool Source/Status: Rectum    Emesis: 0 occurrences. Characteristics:        VITAL SIGNS  Patient Vitals for the past 12 hrs:   Temp Pulse Resp BP SpO2   08/20/17 0333 100.3 °F (37.9 °C) (!) 115 22 132/69 96 %   08/20/17 0153 - (!) 140 - - -   08/20/17 0152 (!) 103.1 °F (39.5 °C) (!) 138 (!) 36 (!) 155/97 99 %   08/20/17 0000 99.7 °F (37.6 °C) 90 20 (!) 155/91 97 %       Pain Assessment  Pain Intensity 1: 0 (08/20/17 0312)        Patient Stated Pain Goal: Unable to verbalize/indicatate pain    Ambulating  No    Shift report given to oncoming nurse at the bedside.     Janet Ford RN

## 2017-08-20 NOTE — PROGRESS NOTES
Rapid response called. Patient with tachypnea, decreased alertness. Spiking a temp. Stat labs, ABG, CXR ordered. POC glucose-140. Ordered IV anti-pyretic and cooling blankets. Sats in 90s on supplemental O2. No seizure activity. Patient becoming more alert during this period.

## 2017-08-20 NOTE — PROGRESS NOTES
RAPID RESPONSE CRITICAL CARE OUTREACH NURSE NOTE      Pt was seen and examined by this Outreach RN following Rapid Response. Pt status/focused assessment upon arrival to Rapid Response as follows:      SITUATION: Pt with increased respirations. Temp 103. 1. BP above normal range. Tachycardic 120-130's. NEURO: Looks at you when you speak, but did not speak. RESP: Tachypnea        CARDIAC: Sinus Tach        GI:        :        LATEST VITAL SIGNS AND LAB VALUES RECORDED:  MEWS Score: 8 (08/20/17 0152)  Vitals:    08/19/17 1916 08/20/17 0000 08/20/17 0152 08/20/17 0153   BP: (!) 143/95 (!) 155/91 (!) 155/97    Pulse: 94 90 (!) 138 (!) 140   Resp: 18 20 (!) 36    Temp: 99.9 °F (37.7 °C) 99.7 °F (37.6 °C) (!) 103.1 °F (39.5 °C)    SpO2: 95% 97% 99%    Weight:       Height:         Recent Labs      08/20/17   0200  08/19/17   0525  08/18/17   0614   NA  143  146*  151*   K  4.1  3.5  3.8   CL  110*  114*  119*   CO2  22  21  23   AGAP  11  11  9   GLU  145*  73  125*   BUN  25*  29*  33*   CREA  1.65*  1.37*  1.67*   GFRAA  38*  48*  38*   GFRNA  32*  39*  31*   CA  8.3  7.7*  7.9*        Recent Labs      08/20/17   0200  08/19/17   0525  08/18/17   0614   WBC  3.2*  25.6*  25.4*   HGB  13.4  11.3*  12.2   HCT  41.0  35.2*  38.5   PLT  142*  160  181          LAB WORK PENDING/SENT DURING RAPID RESPONSE:    Primary RN at bedside: Sunshine Pedro, RN  RT at bedside: Amy Olivo MD at bedside: Catina        Interventions completed during Rapid Response: Ofirmev given, BS checked. Labs drawn, CXR. ABG drawn. Is pt transferring to Critical Care bed? NO   Room #:   Did Outreach RN assist with transfer? n/a      Pt disposition IF not transferring to Critical Care: stayed in room, ofirmev given for fever in hopes that this will help with respiratory and cardiac status. Post Rapid Response plan of care: Add to outreach list and follow up.      Ben Garcias RN

## 2017-08-20 NOTE — PROGRESS NOTES
Date of Outreach Update:  Juan R Medici was seen and assessed. Previous Outreach assessment has been reviewed. There have been no significant clinical changes since the completion of the last dated Outreach assessment.     Signed By: Aubry Essex, RN     August 20, 2017 11:27 AM

## 2017-08-20 NOTE — PROGRESS NOTES
Pt was found shaking, chills, elevated HR, Temp. RR called, MD, RT, ICU responded immediately. ABG, chest x-ray, tylenol ordered. Pt remains awake and alert.

## 2017-08-20 NOTE — PROGRESS NOTES
Hospitalist Progress Note    Patient: Ginna Nieto MRN: [id number]  SSN: [id number]    YOB: 1935  Age: [de-identified] y.o. 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 The patient was unresponsive in the ER, and she met SIRS criteria. She was admitted for sepsis and possible new CVA. 8/18 - The patient is more alert today. Yelling out that she wants water. Denies pain. 8/19 - The patient is complaining about her diet. She wants water. Denies pain. Denies CP/SOB. 8/20 - The patient is alert but not speaking this AM. Was a rapid response last evening. Review of systems negative except stated above. Objective:     Visit Vitals    /69 (BP 1 Location: Left arm, BP Patient Position: At rest)    Pulse (!) 115    Temp 100.3 °F (37.9 °C)    Resp 22    Ht 5' 7\" (1.702 m)    Wt 85.7 kg (189 lb)    SpO2 96%    BMI 29.6 kg/m2      Oxygen Therapy  O2 Sat (%): 96 % (08/20/17 0333)  Pulse via Oximetry: 99 beats per minute (08/17/17 0405)  O2 Device: Nasal cannula (08/18/17 0943)  O2 Flow Rate (L/min): 2 l/min (08/18/17 0943)      Intake and Output:     Intake/Output Summary (Last 24 hours) at 08/20/17 0831  Last data filed at 08/20/17 0333   Gross per 24 hour   Intake             2519 ml   Output             1100 ml   Net             1419 ml         Physical Exam:   GENERAL: Alert, cooperative, no distress, appears stated age  EYE: conjunctivae/corneas clear. PERRL. THROAT & NECK: normal and no erythema or exudates noted. LUNG: clear to auscultation bilaterally  HEART: regular rate and rhythm, S1S2, no murmur, no JVD  ABDOMEN: soft, non-tender, non-distended.  Bowel sounds normal.   EXTREMITIES:  No edema, 2+ pedal/radial pulses bilaterally  SKIN: no rash or abnormalities  NEUROLOGIC: Alert. Hard to understand speech. Right sided hemiplegia. ? Left sided weakness    Lab/Data Review:  Recent Results (from the past 24 hour(s))   GLUCOSE, POC    Collection Time: 08/19/17 11:55 AM   Result Value Ref Range    Glucose (POC) 82 65 - 100 mg/dL   GLUCOSE, POC    Collection Time: 08/19/17  4:22 PM   Result Value Ref Range    Glucose (POC) 163 (H) 65 - 100 mg/dL   GLUCOSE, POC    Collection Time: 08/19/17  9:22 PM   Result Value Ref Range    Glucose (POC) 158 (H) 65 - 100 mg/dL   GLUCOSE, POC    Collection Time: 08/20/17  1:51 AM   Result Value Ref Range    Glucose (POC) 142 (H) 65 - 212 mg/dL   METABOLIC PANEL, BASIC    Collection Time: 08/20/17  2:00 AM   Result Value Ref Range    Sodium 143 136 - 145 mmol/L    Potassium 4.1 3.5 - 5.1 mmol/L    Chloride 110 (H) 98 - 107 mmol/L    CO2 22 21 - 32 mmol/L    Anion gap 11 7 - 16 mmol/L    Glucose 145 (H) 65 - 100 mg/dL    BUN 25 (H) 8 - 23 MG/DL    Creatinine 1.65 (H) 0.6 - 1.0 MG/DL    GFR est AA 38 (L) >60 ml/min/1.73m2    GFR est non-AA 32 (L) >60 ml/min/1.73m2    Calcium 8.3 8.3 - 10.4 MG/DL   CBC WITH AUTOMATED DIFF    Collection Time: 08/20/17  2:00 AM   Result Value Ref Range    WBC 3.2 (L) 4.3 - 11.1 K/uL    RBC 4.69 4.05 - 5.25 M/uL    HGB 13.4 11.7 - 15.4 g/dL    HCT 41.0 35.8 - 46.3 %    MCV 87.4 79.6 - 97.8 FL    MCH 28.6 26.1 - 32.9 PG    MCHC 32.7 31.4 - 35.0 g/dL    RDW 13.9 11.9 - 14.6 %    PLATELET 254 (L) 753 - 450 K/uL    MPV 11.8 10.8 - 14.1 FL    DF AUTOMATED      NEUTROPHILS 57 43 - 78 %    LYMPHOCYTES 40 13 - 44 %    MONOCYTES 1 (L) 4.0 - 12.0 %    EOSINOPHILS 1 0.5 - 7.8 %    BASOPHILS 0 0.0 - 2.0 %    IMMATURE GRANULOCYTES 1.0 0.0 - 5.0 %    ABS. NEUTROPHILS 1.8 1.7 - 8.2 K/UL    ABS. LYMPHOCYTES 1.3 0.5 - 4.6 K/UL    ABS. MONOCYTES 0.0 (L) 0.1 - 1.3 K/UL    ABS. EOSINOPHILS 0.0 0.0 - 0.8 K/UL    ABS. BASOPHILS 0.0 0.0 - 0.2 K/UL    ABS. IMM.  GRANS. 0.0 0.0 - 0.5 K/UL   BLOOD GAS, ARTERIAL    Collection Time: 08/20/17  2:05 AM   Result Value Ref Range    pH 7.45 7.35 - 7.45      PCO2 24 (L) 35 - 45 mmHg    PO2 87 80 - 105 mmHg    BICARBONATE 17 (L) 22 - 26 mmol/L    BASE DEFICIT 5.6 (H) 0 - 2 mmol/L    TOTAL HEMOGLOBIN 13.3 11.7 - 15.0 GM/DL    O2 SAT 98 92 - 98.5 %    Arterial O2 Hgb 96.1 94 - 97 %    CARBOXYHEMOGLOBIN 1.4 0.5 - 1.5 %    METHEMOGLOBIN 0.1 0.0 - 1.5 %    DEOXYHEMOGLOBIN 2 0.0 - 5.0 %    SITE LR     ALLENS TEST POSITIVE      O2 FLOW 5.00 L/min   GLUCOSE, POC    Collection Time: 08/20/17  5:34 AM   Result Value Ref Range    Glucose (POC) 94 65 - 100 mg/dL       Imaging:  Ct Head Wo Cont    Result Date: 8/17/2017  IMPRESSION: 1. Chronic small vessel changes. Generalized cerebral volume loss, age-related. Findings are unchanged compared to prior exam. 2. No evidence of acute intracranial abnormality. Ct Head Wo Cont    Result Date: 8/16/2017  IMPRESSION: 1. No CT evidence of acute territorial infarction. Diffuse chronic small vessel changes diminish sensitivity for detection of small acute/subacute infarct. MRI is more sensitive. 2. A 3 mm focus of hyperdensity in the right basal ganglia region, likely due to calcification or less likely small focus of hemorrhage. Xr Chest Port    Result Date: 8/16/2017  IMPRESSION: No pulmonary consolidation. Cultures:   All Micro Results     Procedure Component Value Units Date/Time    CULTURE, URINE [[id number]]  (Abnormal)  (Susceptibility) Collected:  08/17/17 0214    Order Status:  Completed Specimen:  Urine from Clean catch Updated:  08/20/17 0746     Special Requests: NO SPECIAL REQUESTS        Culture result:         >100,000 COLONIES/mL PROTEUS MIRABILIS (A)    CULTURE, BLOOD [[id number]]  (Abnormal)  (Susceptibility) Collected:  08/16/17 2155    Order Status:  Completed Specimen:  Whole Blood from Blood Updated:  08/20/17 0709     Special Requests:  l hand     GRAM STAIN GRAM POSITIVE COCCI                 AEROBIC AND ANAEROBIC BOTTLES RESULTS VERIFIED, PHONED TO AND READ BACK BY Hermann Arrington RN @[phone] 8/17/17 ES     Culture result:         STAPHYLOCOCCUS EPIDERMIDIS Use Rifampin only in conjunction with another antimicrobial agent. Do not use as Monotherapy. This Staph species is presumed resistant based on detection of inducible clindamycin resistance (A)              SA target DNA sequence not detected within the sample. Test performed by PCR    CULTURE, BLOOD [[id number]] Collected:  08/18/17 0835    Order Status:  Completed Specimen:  Blood from Blood Updated:  08/20/17 0636     Special Requests: LEFT HAND        Culture result: NO GROWTH 2 DAYS       CULTURE, BLOOD [[id number]] Collected:  08/18/17 0820    Order Status:  Completed Specimen:  Blood from Blood Updated:  08/20/17 0636     Special Requests: LEFT HAND        Culture result: NO GROWTH 2 DAYS       CULTURE, BLOOD [[id number]] Collected:  08/16/17 2200    Order Status:  Completed Specimen:  Whole Blood from Blood Updated:  08/20/17 0635     Special Requests: l arm     Culture result: NO GROWTH 3 DAYS       MRSA SCREEN - PCR (NASAL) [[id number]] Collected:  08/17/17 0530    Order Status:  Completed Specimen:  Nasal from Nares Updated:  08/17/17 0900     Special Requests: NO SPECIAL REQUESTS        Culture result:         MRSA target DNA is not detected (presumptive not colonized with MRSA)          Assessment & Plan:     1. Severe Sepsis - Was a rapid response last evening. Now improved. Likely from #2. Change Cefepime to Cipro based on cultures. 2. Proteus Mirabalis UTI - Resistant to Cefepime so have not been treating correctly --> start Cipro IV. PCN allergy. 3. GPC in 1/2 blood cultures - I suspect contaminant. Will continue Cefepime. MRSA DNA not detected so no Vancomycin. Repeat blood cultures on 8/18 NGTD. 4. Metabolic Encephalopathy - Patient intermittently responsive this AM. Secondary to #1 & #2. Check CT Head this AM.    5. Facial droop with aphasia - ? Resolved. Probably secondary to #1 & #2. Will monitor. No MRI for now. 6. H/O CVAs - with hemplegia. Continue Lipitor if taking PO.    7. Hypernatremia - Improved. Likely from normal saline - Continue D5-0.45% saline. Repeat BMP daily. 8. Diabetes Type 2 - -187 x 24 hours. Continue D5 until taking PO. Lantus 17U QHS. Humalog SSI.     DIET DIABETIC CONSISTENT CARB Pureed; 1 NECTAR    Signed By: Ac Wilson DO     August 20, 2017

## 2017-08-20 NOTE — PROGRESS NOTES
During hourly round, patient was found to be tachyphemic, with an elevated temperature of 103F oral; and elevated heart rate of 140. Called rapid response, received orders for IV tylenol, a STAT chest x-ray, and blood gasses. Gave the patient a bed bath to cool the patient off. IV tylenol was administered.  The patient's

## 2017-08-21 LAB
ANION GAP SERPL CALC-SCNC: 9 MMOL/L (ref 7–16)
BASOPHILS # BLD: 0 K/UL (ref 0–0.2)
BASOPHILS NFR BLD: 0 % (ref 0–2)
BUN SERPL-MCNC: 20 MG/DL (ref 8–23)
CALCIUM SERPL-MCNC: 8.3 MG/DL (ref 8.3–10.4)
CHLORIDE SERPL-SCNC: 112 MMOL/L (ref 98–107)
CO2 SERPL-SCNC: 24 MMOL/L (ref 21–32)
CREAT SERPL-MCNC: 1.12 MG/DL (ref 0.6–1)
DIFFERENTIAL METHOD BLD: ABNORMAL
EOSINOPHIL # BLD: 0.2 K/UL (ref 0–0.8)
EOSINOPHIL NFR BLD: 1 % (ref 0.5–7.8)
ERYTHROCYTE [DISTWIDTH] IN BLOOD BY AUTOMATED COUNT: 13.8 % (ref 11.9–14.6)
GLUCOSE BLD STRIP.AUTO-MCNC: 114 MG/DL (ref 65–100)
GLUCOSE BLD STRIP.AUTO-MCNC: 135 MG/DL (ref 65–100)
GLUCOSE BLD STRIP.AUTO-MCNC: 52 MG/DL (ref 65–100)
GLUCOSE BLD STRIP.AUTO-MCNC: 57 MG/DL (ref 65–100)
GLUCOSE BLD STRIP.AUTO-MCNC: 62 MG/DL (ref 65–100)
GLUCOSE BLD STRIP.AUTO-MCNC: 73 MG/DL (ref 65–100)
GLUCOSE BLD STRIP.AUTO-MCNC: 79 MG/DL (ref 65–100)
GLUCOSE SERPL-MCNC: 68 MG/DL (ref 65–100)
HCT VFR BLD AUTO: 35.5 % (ref 35.8–46.3)
HGB BLD-MCNC: 11.3 G/DL (ref 11.7–15.4)
IMM GRANULOCYTES # BLD: 0.1 K/UL (ref 0–0.5)
IMM GRANULOCYTES NFR BLD: 0.6 % (ref 0–5)
LYMPHOCYTES # BLD: 1.4 K/UL (ref 0.5–4.6)
LYMPHOCYTES NFR BLD: 9 % (ref 13–44)
MCH RBC QN AUTO: 27.8 PG (ref 26.1–32.9)
MCHC RBC AUTO-ENTMCNC: 31.8 G/DL (ref 31.4–35)
MCV RBC AUTO: 87.2 FL (ref 79.6–97.8)
MONOCYTES # BLD: 0.9 K/UL (ref 0.1–1.3)
MONOCYTES NFR BLD: 6 % (ref 4–12)
NEUTS SEG # BLD: 13.3 K/UL (ref 1.7–8.2)
NEUTS SEG NFR BLD: 83 % (ref 43–78)
PLATELET # BLD AUTO: 132 K/UL (ref 150–450)
PMV BLD AUTO: 12.3 FL (ref 10.8–14.1)
POTASSIUM SERPL-SCNC: 3.1 MMOL/L (ref 3.5–5.1)
RBC # BLD AUTO: 4.07 M/UL (ref 4.05–5.25)
SODIUM SERPL-SCNC: 145 MMOL/L (ref 136–145)
WBC # BLD AUTO: 15.9 K/UL (ref 4.3–11.1)

## 2017-08-21 PROCEDURE — 82962 GLUCOSE BLOOD TEST: CPT

## 2017-08-21 PROCEDURE — 74011250637 HC RX REV CODE- 250/637: Performed by: INTERNAL MEDICINE

## 2017-08-21 PROCEDURE — 74011000258 HC RX REV CODE- 258: Performed by: INTERNAL MEDICINE

## 2017-08-21 PROCEDURE — 94760 N-INVAS EAR/PLS OXIMETRY 1: CPT

## 2017-08-21 PROCEDURE — 36415 COLL VENOUS BLD VENIPUNCTURE: CPT | Performed by: INTERNAL MEDICINE

## 2017-08-21 PROCEDURE — 74011250636 HC RX REV CODE- 250/636: Performed by: INTERNAL MEDICINE

## 2017-08-21 PROCEDURE — 92526 ORAL FUNCTION THERAPY: CPT

## 2017-08-21 PROCEDURE — 80048 BASIC METABOLIC PNL TOTAL CA: CPT | Performed by: INTERNAL MEDICINE

## 2017-08-21 PROCEDURE — 85025 COMPLETE CBC W/AUTO DIFF WBC: CPT | Performed by: INTERNAL MEDICINE

## 2017-08-21 PROCEDURE — 65660000000 HC RM CCU STEPDOWN

## 2017-08-21 PROCEDURE — 76937 US GUIDE VASCULAR ACCESS: CPT

## 2017-08-21 PROCEDURE — 77010033678 HC OXYGEN DAILY

## 2017-08-21 PROCEDURE — 74011636637 HC RX REV CODE- 636/637: Performed by: INTERNAL MEDICINE

## 2017-08-21 PROCEDURE — C1751 CATH, INF, PER/CENT/MIDLINE: HCPCS

## 2017-08-21 RX ORDER — SODIUM CHLORIDE 0.9 % (FLUSH) 0.9 %
10 SYRINGE (ML) INJECTION EVERY 8 HOURS
Status: DISCONTINUED | OUTPATIENT
Start: 2017-08-21 | End: 2017-08-22 | Stop reason: HOSPADM

## 2017-08-21 RX ORDER — DEXTROSE 50 % IN WATER (D50W) INTRAVENOUS SYRINGE
25 AS NEEDED
Status: DISCONTINUED | OUTPATIENT
Start: 2017-08-21 | End: 2017-08-22 | Stop reason: HOSPADM

## 2017-08-21 RX ORDER — SODIUM CHLORIDE 0.9 % (FLUSH) 0.9 %
10 SYRINGE (ML) INJECTION AS NEEDED
Status: DISCONTINUED | OUTPATIENT
Start: 2017-08-21 | End: 2017-08-22 | Stop reason: HOSPADM

## 2017-08-21 RX ADMIN — ATORVASTATIN CALCIUM 40 MG: 40 TABLET, FILM COATED ORAL at 22:30

## 2017-08-21 RX ADMIN — Medication 5 ML: at 06:16

## 2017-08-21 RX ADMIN — Medication 10 ML: at 23:14

## 2017-08-21 RX ADMIN — HEPARIN SODIUM 5000 UNITS: 5000 INJECTION, SOLUTION INTRAVENOUS; SUBCUTANEOUS at 22:30

## 2017-08-21 RX ADMIN — Medication 5 ML: at 23:14

## 2017-08-21 RX ADMIN — CIPROFLOXACIN 400 MG: 2 INJECTION, SOLUTION INTRAVENOUS at 21:30

## 2017-08-21 RX ADMIN — HEPARIN SODIUM 5000 UNITS: 5000 INJECTION, SOLUTION INTRAVENOUS; SUBCUTANEOUS at 06:15

## 2017-08-21 RX ADMIN — INSULIN GLARGINE 17 UNITS: 100 INJECTION, SOLUTION SUBCUTANEOUS at 10:02

## 2017-08-21 RX ADMIN — HEPARIN SODIUM 5000 UNITS: 5000 INJECTION, SOLUTION INTRAVENOUS; SUBCUTANEOUS at 13:54

## 2017-08-21 RX ADMIN — DEXTROSE MONOHYDRATE AND SODIUM CHLORIDE 150 ML/HR: 5; .45 INJECTION, SOLUTION INTRAVENOUS at 23:09

## 2017-08-21 NOTE — ROUTINE PROCESS
END OF SHIFT NOTE:    INTAKE/OUTPUT  08/20 0701 - 08/21 0700  In: 1656 [I.V.:1656]  Out: 2000 [Urine:2000]  Voiding: NO  Catheter: YES  Drain:              Flatus: Patient does have flatus present. Stool:  1 occurrences. Characteristics:  Stool Assessment  Stool Color: Brown  Stool Appearance: Loose  Stool Amount: Medium  Stool Source/Status: Rectum    Emesis: 0 occurrences. Characteristics:        VITAL SIGNS  Patient Vitals for the past 12 hrs:   Temp Pulse Resp BP SpO2   08/21/17 0000 98.1 °F (36.7 °C) 86 18 128/77 100 %   08/20/17 1945 98 °F (36.7 °C) 78 20 133/77 98 %       Pain Assessment  Pain Intensity 1: 0 (08/20/17 0800)        Patient Stated Pain Goal: Unable to verbalize/indicatate pain    Ambulating  No    Shift report given to oncoming nurse at the bedside.     Harrison Nazario RN

## 2017-08-21 NOTE — PROGRESS NOTES
MIDLINE Placement Note    PRE-PROCEDURE VERIFICATION  PROCEDURE DETAIL  Time out completed all person present in agreement with time out. Annie Molina rn in agreement with time out. A single lumen Midline was started for vascular access. The following documentation is in addition to the Midline properties in the lines/airways flowsheet :  Lot #: [id number]  Xylocaine used: yes  Mid-Arm Circumference: 30 (cm)  Internal Catheter Length: 8 (cm)  Internal Catheter Total Length: 8 (cm)  Vein Selection for Midline:left basilic    Line is okay to use: yes.

## 2017-08-21 NOTE — PROGRESS NOTES
Repeat blood sugars 57 after juice and then 62 after encouraging and assisting patient to eat dinner. Spoke with MD and received order to give one amp of D50 now and continue to monitor.

## 2017-08-21 NOTE — PROGRESS NOTES
Hospitalist Progress Note    Patient: Kosta Marina MRN: [id number]  SSN: [id number]    YOB: 1935  Age: [de-identified] y.o. 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 The patient was unresponsive in the ER, and she met SIRS criteria. She was admitted for sepsis and possible new CVA. 8/18 - The patient is more alert today. Yelling out that she wants water. Denies pain. 8/19 - The patient is complaining about her diet. She wants water. Denies pain. Denies CP/SOB. 8/20 - The patient is alert but not speaking this AM. Was a rapid response last evening.  8/21 - She is somnolent this AM. Will open eyes. Review of systems negative except stated above. Objective:     Visit Vitals    /80    Pulse 80    Temp 98.6 °F (37 °C)    Resp 18    Ht 5' 7\" (1.702 m)    Wt 85.7 kg (189 lb)    SpO2 100%    BMI 29.6 kg/m2      Oxygen Therapy  O2 Sat (%): 100 % (08/21/17 0736)  Pulse via Oximetry: 99 beats per minute (08/17/17 0405)  O2 Device: Nasal cannula (08/21/17 0736)  O2 Flow Rate (L/min): 2 l/min (08/21/17 0736)  FIO2 (%): 28 % (08/21/17 0736)      Intake and Output:     Intake/Output Summary (Last 24 hours) at 08/21/17 0910  Last data filed at 08/21/17 0720   Gross per 24 hour   Intake             1656 ml   Output             1700 ml   Net              -44 ml         Physical Exam:   GENERAL: Somnolent, cooperative, no distress, appears stated age  EYE: conjunctivae/corneas clear. PERRL. THROAT & NECK: normal and no erythema or exudates noted. LUNG: clear to auscultation bilaterally  HEART: regular rate and rhythm, S1S2, no murmur, no JVD  ABDOMEN: soft, non-tender, non-distended.  Bowel sounds normal.   EXTREMITIES:  No edema, 2+ pedal/radial pulses bilaterally  SKIN: no rash or abnormalities  NEUROLOGIC: Somnolent. Hard to understand speech. Right sided hemiplegia. ? Left sided weakness    Lab/Data Review:  Recent Results (from the past 24 hour(s))   PLEASE READ & DOCUMENT PPD TEST IN 72 HRS    Collection Time: 08/20/17  9:31 AM   Result Value Ref Range    PPD  Negative    mm Induration  0 mm   GLUCOSE, POC    Collection Time: 08/20/17 12:19 PM   Result Value Ref Range    Glucose (POC) 112 (H) 65 - 100 mg/dL   GLUCOSE, POC    Collection Time: 08/20/17  4:52 PM   Result Value Ref Range    Glucose (POC) 78 65 - 100 mg/dL   GLUCOSE, POC    Collection Time: 08/20/17 10:05 PM   Result Value Ref Range    Glucose (POC) 94 65 - 100 mg/dL   GLUCOSE, POC    Collection Time: 08/21/17  5:59 AM   Result Value Ref Range    Glucose (POC) 73 65 - 100 mg/dL   CBC WITH AUTOMATED DIFF    Collection Time: 08/21/17  6:31 AM   Result Value Ref Range    WBC 15.9 (H) 4.3 - 11.1 K/uL    RBC 4.07 4.05 - 5.25 M/uL    HGB 11.3 (L) 11.7 - 15.4 g/dL    HCT 35.5 (L) 35.8 - 46.3 %    MCV 87.2 79.6 - 97.8 FL    MCH 27.8 26.1 - 32.9 PG    MCHC 31.8 31.4 - 35.0 g/dL    RDW 13.8 11.9 - 14.6 %    PLATELET 105 (L) 237 - 450 K/uL    MPV 12.3 10.8 - 14.1 FL    DF AUTOMATED      NEUTROPHILS 83 (H) 43 - 78 %    LYMPHOCYTES 9 (L) 13 - 44 %    MONOCYTES 6 4.0 - 12.0 %    EOSINOPHILS 1 0.5 - 7.8 %    BASOPHILS 0 0.0 - 2.0 %    IMMATURE GRANULOCYTES 0.6 0.0 - 5.0 %    ABS. NEUTROPHILS 13.3 (H) 1.7 - 8.2 K/UL    ABS. LYMPHOCYTES 1.4 0.5 - 4.6 K/UL    ABS. MONOCYTES 0.9 0.1 - 1.3 K/UL    ABS. EOSINOPHILS 0.2 0.0 - 0.8 K/UL    ABS. BASOPHILS 0.0 0.0 - 0.2 K/UL    ABS. IMM.  GRANS. 0.1 0.0 - 0.5 K/UL   METABOLIC PANEL, BASIC    Collection Time: 08/21/17  6:31 AM   Result Value Ref Range    Sodium 145 136 - 145 mmol/L    Potassium 3.1 (L) 3.5 - 5.1 mmol/L    Chloride 112 (H) 98 - 107 mmol/L    CO2 24 21 - 32 mmol/L    Anion gap 9 7 - 16 mmol/L    Glucose 68 65 - 100 mg/dL    BUN 20 8 - 23 MG/DL    Creatinine 1.12 (H) 0.6 - 1.0 MG/DL    GFR est AA >60 >60 ml/min/1.73m2    GFR est non-AA 50 (L) >60 ml/min/1.73m2    Calcium 8.3 8.3 - 10.4 MG/DL       Imaging:  Ct Head Wo Cont    Result Date: 8/17/2017  IMPRESSION: 1. Chronic small vessel changes. Generalized cerebral volume loss, age-related. Findings are unchanged compared to prior exam. 2. No evidence of acute intracranial abnormality. Ct Head Wo Cont    Result Date: 8/16/2017  IMPRESSION: 1. No CT evidence of acute territorial infarction. Diffuse chronic small vessel changes diminish sensitivity for detection of small acute/subacute infarct. MRI is more sensitive. 2. A 3 mm focus of hyperdensity in the right basal ganglia region, likely due to calcification or less likely small focus of hemorrhage. Xr Chest Port    Result Date: 8/16/2017  IMPRESSION: No pulmonary consolidation. Cultures:   All Micro Results     Procedure Component Value Units Date/Time    CULTURE, BLOOD [[id number]] Collected:  08/18/17 0835    Order Status:  Completed Specimen:  Blood from Blood Updated:  08/21/17 0657     Special Requests: LEFT HAND        Culture result: NO GROWTH 3 DAYS       CULTURE, BLOOD [[id number]] Collected:  08/18/17 0820    Order Status:  Completed Specimen:  Blood from Blood Updated:  08/21/17 0657     Special Requests: LEFT HAND        Culture result: NO GROWTH 3 DAYS       CULTURE, BLOOD [[id number]] Collected:  08/16/17 2200    Order Status:  Completed Specimen:  Whole Blood from Blood Updated:  08/21/17 0657     Special Requests: l arm     Culture result: NO GROWTH 4 DAYS       CULTURE, URINE [[id number]]  (Abnormal)  (Susceptibility) Collected:  08/17/17 0214    Order Status:  Completed Specimen:  Urine from Clean catch Updated:  08/20/17 0746     Special Requests: NO SPECIAL REQUESTS        Culture result:         >100,000 COLONIES/mL PROTEUS MIRABILIS (A)    CULTURE, BLOOD [[id number]]  (Abnormal)  (Susceptibility) Collected:  08/16/17 2155    Order Status: Completed Specimen:  Whole Blood from Blood Updated:  08/20/17 0709     Special Requests:  l hand     GRAM STAIN GRAM POSITIVE COCCI                 AEROBIC AND ANAEROBIC BOTTLES              RESULTS VERIFIED, PHONED TO AND READ BACK BY Dorian Thurston RN @7466 8/17/17 ES     Culture result:         STAPHYLOCOCCUS EPIDERMIDIS Use Rifampin only in conjunction with another antimicrobial agent. Do not use as Monotherapy. This Staph species is presumed resistant based on detection of inducible clindamycin resistance (A)              SA target DNA sequence not detected within the sample. Test performed by PCR    MRSA SCREEN - PCR (NASAL) [[id number]] Collected:  08/17/17 0530    Order Status:  Completed Specimen:  Nasal from Nares Updated:  08/17/17 0900     Special Requests: NO SPECIAL REQUESTS        Culture result:         MRSA target DNA is not detected (presumptive not colonized with MRSA)          Assessment & Plan:     1. Severe Sepsis - Was a rapid response overnight 8/19/17-8/20/17. Now improved. Likely from #2. Changed Cefepime to Cipro based on cultures. WBC 22 --> 15.    2. Proteus Mirabalis UTI - Resistant to Cefepime so have not been treating correctly --> started Cipro IV on 8/20. PCN allergy. 3. GPC in 1/2 blood cultures - I suspect contaminant. Will continue to monitor. MRSA DNA not detected so no Vancomycin. Repeat blood cultures on 8/18 NGTD. 4. Metabolic Encephalopathy - Patient intermittently responsive this AM. Secondary to #1 & #2. Repeat CT Head shows no acute abnormality. 5. Facial droop with aphasia - ? Resolved. Probably secondary to #1 & #2. Will monitor. No MRI for now. 6. H/O CVAs - with hemplegia. Continue Lipitor if taking PO.    7. Hypernatremia - Improved. Likely from normal saline - Continue D5-0.45% saline. Repeat BMP daily. 8. Diabetes Type 2 - -187 x 24 hours. Continue D5 until taking PO. Lantus 17U QHS. Humalog SSI.     9. Dispo - plan to discharge back to SNF in next 2-3 days - has 10 day bed hold    DIET DIABETIC CONSISTENT CARB Pureed; 1 NECTAR    Signed By: Yobani Gallardo DO     August 21, 2017

## 2017-08-21 NOTE — PROGRESS NOTES
LTG: Patient will tolerate least restrictive diet without overt signs or symptoms of airway compromise. STG: Patient will tolerate puree and nectar thick liquids without overt signs or symptoms of airway compromise. STG: Patient will participate in modified barium swallow study as clinically indicated. Speech language pathology: bedside swallow note: Daily Note and Discharge    NAME/AGE/GENDER: Jairon Durand is a [de-identified] y.o. female  DATE: 8/21/2017  PRIMARY DIAGNOSIS: CVA (cerebral vascular accident) [facility])       ICD-10: Treatment Diagnosis: R13.12 Oropharyngeal Dysphagia. INTERDISCIPLINARY COLLABORATION: Registered Nurse  PRECAUTIONS/ALLERGIES: Pcn [penicillins] ASSESSMENT:Patient seen for diet tolerance. Pt observed with pureed and nectar thick liquids. No signs/sx aspiration observed. No swallowing delays observed. Recommend continuing with pureed/nectar. No further ST indicated. ?????? ? ? This section established at most recent assessment??????????  PROBLEM LIST (Impairments causing functional limitations):  1. Oropharyngeal dysphagia  REHABILITATION POTENTIAL FOR STATED GOALS: Fair  PLAN OF CARE:   Patient will benefit from skilled intervention to address the following impairments. RECOMMENDATIONS AND PLANNED INTERVENTIONS (Benefits and precautions of therapy have been discussed with the patient.):  · PO:  Pureed  · Liquids:  nectar  MEDICATIONS:  · Crushed in puree  COMPENSATORY STRATEGIES/MODIFICATIONS INCLUDING:  · Alternate liquids/solids  · Small sips and bites  RECOMMENDED REHABILITATION/EQUIPMENT: (at time of discharge pending progress):   None. SUBJECTIVE:   Pt cooperative. History of Present Injury/Illness: Ms. Michelle Roberts  has a past medical history of Calculus of kidney; Diabetes ([address] 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 ([address] (1/26/2016); Hemiplegia affecting dominant side ([address] (1/26/2016); Hypertension; Mixed hyperlipidemia (1/26/2016);  Neuropathy in diabetes [facility]) (1/26/2016); Overactive bladder (1/26/2016); Psychiatric disorder; and Stroke [facility]). .  She also  has a past surgical history that includes orthopaedic (2006) and heent (1950's). Present Symptoms: Coughing with thin liquids    Pain Intensity 1: 2  Pain Location 1: Hand  Current Medications:   No current facility-administered medications on file prior to encounter. Current Outpatient Prescriptions on File Prior to Encounter   Medication Sig Dispense Refill    atorvastatin (LIPITOR) 80 mg tablet 1 Tab by Per G Tube route nightly. 30 Tab 0    metoprolol succinate (TOPROL-XL) 100 mg tablet Take 1 Tab by mouth daily. 30 Tab 0    lisinopril (PRINIVIL, ZESTRIL) 40 mg tablet Take 1 Tab by mouth daily. 30 Tab 0    insulin glargine (LANTUS) 100 unit/mL injection 24 Units by SubCUTAneous route daily. 2 Vial 2    DULoxetine (CYMBALTA) 60 mg capsule Take 1 Cap by mouth daily. 90 Cap 2     Current Dietary Status:  NPO      Social History/Home Situation:    Home Environment: [address] [place] [address] Name: Brent Ch  # Steps to Enter: 0  One/Two Story Residence: One story  Living Alone: No  Support Systems: Child(frederic), Skilled nursing facility  Patient Expects to be Discharged to[de-identified] Skilled nursing facility  Current DME Used/Available at Home: Wheelchair  Tub or Shower Type: Shower  OBJECTIVE:   Respiratory Status:  Nasal cannula  2 l/min  CXR Results: No pulmonary consolidation  MRI/CT Results:1. Chronic small vessel changes. Generalized cerebral volume loss, age-related. Findings are unchanged compared to prior exam.   2. No evidence of acute intracranial abnormality    Cognitive and Communication Status:  Neurologic State: Alert      BEDSIDE SWALLOW EVALUATION  Oral Assessment:     P.O.  Trials:  Patient Position: upright in bed    The patient was given teaspoon to straw amounts of the following:   Consistency Presented: Nectar thick liquid;Puree  How Presented: Self-fed/presented;SLP-fed/presented;Spoon;Straw;Successive swallows    ORAL PHASE:  Bolus Acceptance: No impairment  Bolus Formation/Control: No impairment  Propulsion: No impairment     Oral Residue: None    PHARYNGEAL PHASE:  Initiation of Swallow: No impairment     Aspiration Signs/Symptoms: None  Vocal Quality: No impairment                OTHER OBSERVATIONS:  Rate/bite size: Impaired   Endurance:  Impaired   Comments: Tool Used: Dysphagia Outcome and Severity Scale (MARCELO)    Score Comments   Normal Diet  [] 7 With no strategies or extra time needed   Functional Swallow  [] 6 May have mild oral or pharyngeal delay       Mild Dysphagia    [] 5 Which may require one diet consistency restricted (those who demonstrate penetration which is entirely cleared on MBS would be included)   Mild-Moderate Dysphagia  [] 4 With 1-2 diet consistencies restricted       Moderate Dysphagia  [x] 3 With 2 or more diet consistencies restricted       Moderately Severe Dysphagia  [] 2 With partial PO strategies (trials with ST only)       Severe Dysphagia  [] 1 With inability to tolerate any PO safely          Score:  Initial: 3 Most Recent: 3 (Date: 8/21/17 )   Interpretation of Tool: The Dysphagia Outcome and Severity Scale (MARCELO) is a simple, easy-to-use, 7-point scale developed to systematically rate the functional severity of dysphagia based on objective assessment and make recommendations for diet level, independence level, and type of nutrition. Score 7 6 5 4 3 2 1   Modifier CH CI CJ CK CL CM CN   ?  Swallowing:     - CURRENT STATUS: CL - 60%-79% impaired, limited or restricted    - GOAL STATUS:  CL - 60%-79% impaired, limited or restricted    - D/C STATUS:  CL - 60%-79% impaired, limited or restricted  [place] / [place] / Product Type: Medicare /     TREATMENT:    (In addition to Assessment/Re-Assessment sessions the following treatments were rendered)  Dysphagia Activities: Activities/Procedures listed utilized to improve progress in diet tolerance. Required no cueing to improve swallow safety.              __________________________________________________________________________________________________  Safety:   After treatment position/precautions:  · Upright in Bed.     Total Treatment Duration:Time In: 3601  Time Out: [address], MSP, CCC-SLP

## 2017-08-21 NOTE — PROGRESS NOTES
END OF SHIFT NOTE:    INTAKE/OUTPUT  08/20 0701 - 08/21 0700  In: 1656 [I.V.:1656]  Out: 2300 [Urine:2300]  Voiding: NO  Catheter: YES  Drain:        Flatus: Patient does have flatus present. Stool:  1 occurrences. Characteristics:  Stool Assessment  Stool Color: Brown  Stool Appearance: Loose  Stool Amount: Medium  Stool Source/Status: Rectum    Emesis: 0 occurrences. Characteristics:        VITAL SIGNS  Patient Vitals for the past 12 hrs:   Temp Pulse Resp BP SpO2   08/21/17 1530 97.9 °F (36.6 °C) 72 17 141/81 99 %   08/21/17 1142 98.4 °F (36.9 °C) 73 17 137/77 99 %   08/21/17 0736 - - - - 100 %   08/21/17 0720 98.6 °F (37 °C) 80 18 137/80 100 %       Pain Assessment  Pain Intensity 1: 2 (08/21/17 1045)  Pain Location 1: Hand     Patient Stated Pain Goal: Unable to verbalize/indicatate pain    Ambulating  No    Shift report given to oncoming nurse at the bedside.     Adelia Grissom RN

## 2017-08-22 VITALS
RESPIRATION RATE: 18 BRPM | HEIGHT: 67 IN | HEART RATE: 78 BPM | OXYGEN SATURATION: 94 % | BODY MASS INDEX: 29.66 KG/M2 | TEMPERATURE: 97.6 F | DIASTOLIC BLOOD PRESSURE: 75 MMHG | SYSTOLIC BLOOD PRESSURE: 150 MMHG | WEIGHT: 189 LBS

## 2017-08-22 LAB
ANION GAP SERPL CALC-SCNC: 10 MMOL/L (ref 7–16)
BACTERIA SPEC CULT: NORMAL
BASOPHILS # BLD: 0 K/UL (ref 0–0.2)
BASOPHILS NFR BLD: 0 % (ref 0–2)
BUN SERPL-MCNC: 15 MG/DL (ref 8–23)
CALCIUM SERPL-MCNC: 7.9 MG/DL (ref 8.3–10.4)
CHLORIDE SERPL-SCNC: 113 MMOL/L (ref 98–107)
CO2 SERPL-SCNC: 23 MMOL/L (ref 21–32)
CREAT SERPL-MCNC: 0.93 MG/DL (ref 0.6–1)
DIFFERENTIAL METHOD BLD: ABNORMAL
EOSINOPHIL # BLD: 0.2 K/UL (ref 0–0.8)
EOSINOPHIL NFR BLD: 2 % (ref 0.5–7.8)
ERYTHROCYTE [DISTWIDTH] IN BLOOD BY AUTOMATED COUNT: 13.9 % (ref 11.9–14.6)
GLUCOSE BLD STRIP.AUTO-MCNC: 118 MG/DL (ref 65–100)
GLUCOSE BLD STRIP.AUTO-MCNC: 84 MG/DL (ref 65–100)
GLUCOSE SERPL-MCNC: 81 MG/DL (ref 65–100)
HCT VFR BLD AUTO: 32.2 % (ref 35.8–46.3)
HGB BLD-MCNC: 10.9 G/DL (ref 11.7–15.4)
IMM GRANULOCYTES # BLD: 0.2 K/UL (ref 0–0.5)
IMM GRANULOCYTES NFR BLD: 1.8 % (ref 0–5)
LYMPHOCYTES # BLD: 1.4 K/UL (ref 0.5–4.6)
LYMPHOCYTES NFR BLD: 14 % (ref 13–44)
MCH RBC QN AUTO: 28.5 PG (ref 26.1–32.9)
MCHC RBC AUTO-ENTMCNC: 33.9 G/DL (ref 31.4–35)
MCV RBC AUTO: 84.1 FL (ref 79.6–97.8)
MONOCYTES # BLD: 0.9 K/UL (ref 0.1–1.3)
MONOCYTES NFR BLD: 9 % (ref 4–12)
NEUTS SEG # BLD: 7.6 K/UL (ref 1.7–8.2)
NEUTS SEG NFR BLD: 73 % (ref 43–78)
PLATELET # BLD AUTO: 158 K/UL (ref 150–450)
PMV BLD AUTO: 11.6 FL (ref 10.8–14.1)
POTASSIUM SERPL-SCNC: 3.1 MMOL/L (ref 3.5–5.1)
RBC # BLD AUTO: 3.83 M/UL (ref 4.05–5.25)
SERVICE CMNT-IMP: NORMAL
SODIUM SERPL-SCNC: 146 MMOL/L (ref 136–145)
WBC # BLD AUTO: 10.3 K/UL (ref 4.3–11.1)

## 2017-08-22 PROCEDURE — 97530 THERAPEUTIC ACTIVITIES: CPT

## 2017-08-22 PROCEDURE — 80048 BASIC METABOLIC PNL TOTAL CA: CPT | Performed by: INTERNAL MEDICINE

## 2017-08-22 PROCEDURE — 74011636637 HC RX REV CODE- 636/637: Performed by: INTERNAL MEDICINE

## 2017-08-22 PROCEDURE — 74011250636 HC RX REV CODE- 250/636: Performed by: INTERNAL MEDICINE

## 2017-08-22 PROCEDURE — 74011250637 HC RX REV CODE- 250/637: Performed by: INTERNAL MEDICINE

## 2017-08-22 PROCEDURE — 85025 COMPLETE CBC W/AUTO DIFF WBC: CPT | Performed by: INTERNAL MEDICINE

## 2017-08-22 PROCEDURE — 82962 GLUCOSE BLOOD TEST: CPT

## 2017-08-22 RX ORDER — CIPROFLOXACIN 250 MG/1
250 TABLET, FILM COATED ORAL 2 TIMES DAILY
Qty: 10 TAB | Refills: 0 | Status: SHIPPED
Start: 2017-08-22 | End: 2017-09-24 | Stop reason: ALTCHOICE

## 2017-08-22 RX ORDER — INSULIN GLARGINE 100 [IU]/ML
10 INJECTION, SOLUTION SUBCUTANEOUS DAILY
Status: DISCONTINUED | OUTPATIENT
Start: 2017-08-22 | End: 2017-08-22 | Stop reason: HOSPADM

## 2017-08-22 RX ORDER — INSULIN LISPRO 100 [IU]/ML
INJECTION, SOLUTION INTRAVENOUS; SUBCUTANEOUS
Status: DISCONTINUED | OUTPATIENT
Start: 2017-08-22 | End: 2017-08-22 | Stop reason: HOSPADM

## 2017-08-22 RX ORDER — POTASSIUM CHLORIDE 1.5 G/1.77G
40 POWDER, FOR SOLUTION ORAL 2 TIMES DAILY WITH MEALS
Status: DISCONTINUED | OUTPATIENT
Start: 2017-08-22 | End: 2017-08-22 | Stop reason: HOSPADM

## 2017-08-22 RX ORDER — DEXTROSE MONOHYDRATE 50 MG/ML
100 INJECTION, SOLUTION INTRAVENOUS CONTINUOUS
Status: DISCONTINUED | OUTPATIENT
Start: 2017-08-22 | End: 2017-08-22 | Stop reason: HOSPADM

## 2017-08-22 RX ORDER — METOPROLOL SUCCINATE 25 MG/1
25 TABLET, EXTENDED RELEASE ORAL DAILY
Qty: 30 TAB | Refills: 1 | Status: ON HOLD
Start: 2017-08-22 | End: 2017-09-29

## 2017-08-22 RX ORDER — LISINOPRIL 10 MG/1
10 TABLET ORAL DAILY
Qty: 30 TAB | Refills: 1 | Status: ON HOLD | OUTPATIENT
Start: 2017-08-22 | End: 2017-09-29

## 2017-08-22 RX ORDER — ASPIRIN 81 MG/1
81 TABLET ORAL DAILY
Status: DISCONTINUED | OUTPATIENT
Start: 2017-08-22 | End: 2017-08-22 | Stop reason: HOSPADM

## 2017-08-22 RX ORDER — INSULIN GLARGINE 100 [IU]/ML
10 INJECTION, SOLUTION SUBCUTANEOUS DAILY
Qty: 2 VIAL | Refills: 2 | Status: ON HOLD
Start: 2017-08-22 | End: 2017-09-29

## 2017-08-22 RX ORDER — ASPIRIN 81 MG/1
81 TABLET ORAL DAILY
Qty: 30 TAB | Refills: 1 | Status: SHIPPED
Start: 2017-08-22

## 2017-08-22 RX ADMIN — HEPARIN SODIUM 5000 UNITS: 5000 INJECTION, SOLUTION INTRAVENOUS; SUBCUTANEOUS at 05:33

## 2017-08-22 RX ADMIN — ASPIRIN 81 MG: 81 TABLET, COATED ORAL at 09:02

## 2017-08-22 RX ADMIN — POTASSIUM CHLORIDE 40 MEQ: 1.5 POWDER, FOR SOLUTION ORAL at 09:02

## 2017-08-22 RX ADMIN — Medication 5 ML: at 05:34

## 2017-08-22 RX ADMIN — Medication 10 ML: at 05:34

## 2017-08-22 RX ADMIN — INSULIN GLARGINE 10 UNITS: 100 INJECTION, SOLUTION SUBCUTANEOUS at 09:02

## 2017-08-22 RX ADMIN — DEXTROSE MONOHYDRATE 100 ML/HR: 5 INJECTION, SOLUTION INTRAVENOUS at 09:38

## 2017-08-22 RX ADMIN — CIPROFLOXACIN 400 MG: 2 INJECTION, SOLUTION INTRAVENOUS at 09:02

## 2017-08-22 NOTE — PROGRESS NOTES
END OF SHIFT NOTE:    INTAKE/OUTPUT  08/21 0701 - 08/22 0700  In: 162 [I.V.:917]  Out: 1700 [Urine:1700]  Voiding: NO  Catheter: YES  Drain:              Flatus: Patient does have flatus present. Stool:  0 occurrences. Characteristics:  Stool Assessment  Stool Color: Brown  Stool Appearance: Loose  Stool Amount: Medium  Stool Source/Status: Rectum    Emesis: 0 occurrences. Characteristics:        VITAL SIGNS  Patient Vitals for the past 12 hrs:   Temp Pulse Resp BP SpO2   08/22/17 0320 98.6 °F (37 °C) 76 18 148/76 95 %   08/21/17 2303 98.4 °F (36.9 °C) 77 18 146/86 96 %   08/21/17 1930 98.2 °F (36.8 °C) 74 17 142/79 95 %       Pain Assessment  Pain Intensity 1: 0 (08/22/17 0130)  Pain Location 1: Hand     Patient Stated Pain Goal: Unable to verbalize/indicatate pain    Ambulating  No    Shift report given to oncoming nurse at the bedside.     Jaime Atkinson RN

## 2017-08-22 NOTE — PROGRESS NOTES
Date of Outreach Update:  Dunia Orozco was seen and assessed. Previous Outreach assessment has been reviewed. There have been no significant clinical changes since the completion of the last dated Outreach assessment. Pt sleeping. Easily arouses to touch. No distress noted. HR=82. Sat 98%on 2 liters NC. Will continue to follow up per outreach protocol.     Signed By:   Cole Hill RN    August 22, 2017 5:04 AM

## 2017-08-22 NOTE — PROGRESS NOTES
Problem: Falls - Risk of  Goal: *Absence of Falls  Document Hanna Fall Risk and appropriate interventions in the flowsheet.    Outcome: Progressing Towards Goal  Fall Risk Interventions:  Mobility Interventions: Communicate number of staff needed for ambulation/transfer     Mentation Interventions: Evaluate medications/consider consulting pharmacy     Medication Interventions: Evaluate medications/consider consulting pharmacy     Elimination Interventions: Call light in reach     History of Falls Interventions: Door open when patient unattended, Consult care management for discharge planning

## 2017-08-22 NOTE — PROGRESS NOTES
892 y [address]53 ambulance transport arranged for 2:30 pm for return to [facility], room 141A, report [phone]. This plan is ok with Ms. Isaiah Frost (although she is confused at baseline), her daughter Garth Escobar (via phone), and AYSHA Krishnan.

## 2017-08-22 NOTE — PROGRESS NOTES
Critical Care Outreach Nurse Progress Report:    Subjective: In to assess pt secondary to recent rapid response. MEWS Score: 1 (08/21/17 1530)    Vitals:    08/21/17 0736 08/21/17 1142 08/21/17 1530 08/21/17 1930   BP:  137/77 141/81 142/79   Pulse:  73 72 74   Resp:  17 17 17   Temp:  98.4 °F (36.9 °C) 97.9 °F (36.6 °C) 98.2 °F (36.8 °C)   SpO2: 100% 99% 99% 95%   Weight:       Height:            Objective: Pt found resting quietly in bed. Pain Intensity 1: 2 (08/21/17 1045)  Pain Location 1: Hand     Patient Stated Pain Goal: Unable to verbalize/indicatate pain    Assessment: Pt easily arouses to name. Speech is clear. Pt denied any pain. Right arm is contracted. O2 on 2 liters NC with sat of 100%. HR=70. Lungs are diminished. BG =114. Plan: Will follow per protocol.

## 2017-08-22 NOTE — DISCHARGE SUMMARY
Hospitalist Discharge Summary     Patient ID:  Jairon Durand  [id number]  16 y.o.  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 The patient was unresponsive in the ER, and she met SIRS criteria. She was admitted for sepsis and possible new CVA. Hospital Course:    She had CT head which was -ve for acute stroke or bleed. She was found to have Proteus UTI and started on Cipro. Also her overall mentation improved with IVF and Antibiotics. Today she is at baseline mental status and is medically ready for discharge to Longterm care bed at [facility]. She will complete Oral cipro course for uti and follow up with her PCP in 2 weeks for BP and sugars check. Her BP meds doses were decreased Toprol fro 100 to 25mg and Lisinopril from 40 to 10mg. Also lantus reduced fro 24 to 10U due to hypoglycemia and reduced oral intake. Plan discussed with pt/family and all are in agreement with the plan. All questions answered. Pt was stable at time of discharge. Follow up as per below. Significant Diagnostic Imaging:     CT head:    FINDINGS: No acute intracranial hemorrhage, mass, or mass effect. No extra-axial  fluid collections. No midline shift; the basilar cisterns are patent. Stable  moderate volume loss.  The ventricles are stable in caliber and symmetric to the  subarachnoid spaces.     Moderate periventricular, deep, and subcortical patchy white matter low  attenuation is unchanged, likely related to chronic small vessel ischemic  change. Stable bilateral thalamic infarcts. There are no specific signs to  suggest acute large vessel territory ischemia. The gray-white differentiation is  otherwise preserved.     No aggressive calvarial process. Visualized portions of the paranasal sinuses  and mastoid air cells are patent.     IMPRESSIONS:     1. Little significant change: No convincing acute intracranial abnormality.     Please note that MRI is more sensitive for detection of acute ischemia    Labs: Results:       Chemistry Recent Labs      08/22/17   0535  08/21/17   0631  08/20/17   0200   GLU  81  68  145*   NA  146*  145  143   K  3.1*  3.1*  4.1   CL  113*  112*  110*   CO2  23  24  22   BUN  15  20  25*   CREA  0.93  1.12*  1.65*   CA  7.9*  8.3  8.3   AGAP  10  9  11      CBC w/Diff Recent Labs      08/22/17   0535  08/21/17   0631  08/20/17   0830   WBC  10.3  15.9*  22.9*   RBC  3.83*  4.07  4.25   HGB  10.9*  11.3*  11.9   HCT  32.2*  35.5*  37.3   PLT  158  132*  136*   GRANS  73  83*  93*   LYMPH  14  9*  3*   EOS  2  1  0*      Cardiac Enzymes No results for input(s): CPK, CKND1, KIT in the last 72 hours. No lab exists for component: CKRMB, TROIP   Coagulation No results for input(s): PTP, INR, APTT in the last 72 hours. No lab exists for component: INREXT    Last A1c Lab Results   Component Value Date/Time    Hemoglobin A1c 6.7 08/17/2017 05:35 AM    Hemoglobin A1c, External 12.3 H 04/22/2015      Lipid Panel Lab Results   Component Value Date/Time    Cholesterol, total 60 08/17/2017 05:35 AM    HDL Cholesterol 26 08/17/2017 05:35 AM    LDL, calculated 31.4 08/17/2017 05:35 AM    VLDL, calculated 2.6 08/17/2017 05:35 AM    Triglyceride 13 08/17/2017 05:35 AM    CHOL/HDL Ratio 2.3 08/17/2017 05:35 AM      BNP No results for input(s): BNPP in the last 72 hours. Liver Enzymes No results for input(s): TP, ALB, TBIL, AP, SGOT, GPT in the last 72 hours.     No lab exists for component: DBIL   Thyroid Studies Lab Results   Component Value Date/Time    TSH 1.379 11/11/2015 07:10 PM            Discharge Exam:  Patient Vitals for the past 24 hrs:   Temp Pulse Resp BP SpO2   08/22/17 0706 98.2 °F (36.8 °C) 78 18 146/72 94 %   08/22/17 0320 98.6 °F (37 °C) 76 18 148/76 95 %   08/21/17 2303 98.4 °F (36.9 °C) 77 18 146/86 96 %   08/21/17 1930 98.2 °F (36.8 °C) 74 17 142/79 95 %   08/21/17 1530 97.9 °F (36.6 °C) 72 17 141/81 99 %     Visit Vitals    /72    Pulse 78    Temp 98.2 °F (36.8 °C)    Resp 18    Ht 5' 7\" (1.702 m)    Wt 85.7 kg (189 lb)    SpO2 94%    BMI 29.6 kg/m2     General appearance: alert, cooperative, no distress  Lungs: CTABL  Heart: RRR, no m/r/g  Abdomen: soft, non-tender. Bowel sounds normal.   Extremities: no cyanosis. Neurologic: Rt sided paresis. Disposition: NH  Discharge Condition: stable  Patient Instructions: cardiac/diabetic diet  Activity as tolerated  Current Discharge Medication List      START taking these medications    Details   aspirin delayed-release 81 mg tablet Take 1 Tab by mouth daily. Qty: 30 Tab, Refills: 1      ciprofloxacin HCl (CIPRO) 250 mg tablet Take 1 Tab by mouth two (2) times a day. Qty: 10 Tab, Refills: 0         CONTINUE these medications which have CHANGED    Details   insulin glargine (LANTUS) 100 unit/mL injection 10 Units by SubCUTAneous route daily. Qty: 2 Vial, Refills: 2      lisinopril (PRINIVIL, ZESTRIL) 10 mg tablet Take 1 Tab by mouth daily. Qty: 30 Tab, Refills: 1      metoprolol succinate (TOPROL-XL) 25 mg XL tablet Take 1 Tab by mouth daily. Qty: 30 Tab, Refills: 1         CONTINUE these medications which have NOT CHANGED    Details   atorvastatin (LIPITOR) 80 mg tablet 1 Tab by Per G Tube route nightly. Qty: 30 Tab, Refills: 0      DULoxetine (CYMBALTA) 60 mg capsule Take 1 Cap by mouth daily.   Qty: 90 Cap, Refills: 2             Vaccinations:   Pt screened by nursing for pneumococcal and influenza vaccination needs at discharge, will be ordered if needed and pt consented. Immunization History   Administered Date(s) Administered    TB Skin Test (PPD) Intradermal 11/06/2015, 09/07/2016, 08/17/2017       Follow-up Information     Follow up With Details Comments [address] [place] [address], MD   [address] [place] [address] [address]  [phone]            Time spent in the discharge process was 35 minutes.       Signed By: Dyan Valerio MD     August 22, 2017

## 2017-08-22 NOTE — PROGRESS NOTES
TRANSFER - OUT REPORT:    Verbal report given to Justen Guo on Apple Byrne  being transferred to [facility] for routine progression of care       Report consisted of patients Situation, Background, Assessment and   Recommendations(SBAR). Information from the following report(s)  was reviewed with the receiving nurse. Lines:   Midline Catheter 07/62/17 Left;Basilic (Active)   Criteria for Appropriate Use Limited/no vessel suitable for conventional peripheral access 8/22/2017  8:15 AM   Site Assessment Clean, dry, & intact 8/22/2017  8:15 AM   Phlebitis Assessment 0 8/22/2017  8:15 AM   Infiltration Assessment 0 8/22/2017  8:15 AM   Dressing Status Clean, dry, & intact 8/22/2017  8:15 AM   Dressing Type Disk with Chlorhexadine gluconate (CHG); Transparent 8/22/2017  8:15 AM   Action Taken Other (comment) 8/21/2017  6:52 PM   Hub Color/Line Status Infusing 8/22/2017  8:15 AM   Arm Circumference (cm) 30 cm 8/21/2017  6:52 PM   Date of Last Dressing Change 08/21/17 8/21/2017  6:52 PM   External Catheter Length (cm) 0 centimeters 8/21/2017  6:52 PM   Alcohol Cap Used No 8/21/2017  6:52 PM        Opportunity for questions and clarification was provided.       Patient transported with:

## 2017-08-22 NOTE — PROGRESS NOTES
Problem: Self Care Deficits Care Plan (Adult)  Goal: *Acute Goals and Plan of Care (Insert Text)  1. Patient will complete facial bathing with setup, additional time, verbal cues and adaptive equipment as needed. 2. Patient will complete self feeding 50% of meals with L hand. 3. Patient will tolerate 23 minutes of OT treatment with as neede rest breaks to increase activity tolerance for ADLs. 4. Patient will complete functional transfers with moderate assist and adaptive equipment as needed. Timeframe: 7 visits       OCCUPATIONAL THERAPY: Daily Note, Treatment Day: 1st and AM 8/22/2017  INPATIENT: Hospital Day: 7  [place] / [place] / Product Type: Medicare /      NAME/AGE/GENDER: Kalie Garcia is a [de-identified] y.o. female             PRIMARY DIAGNOSIS:  CVA (cerebral vascular accident) ([facility] <principal problem not specified> <principal problem not specified>        ICD-10: Treatment Diagnosis:        · Generalized Muscle Weakness (M62.81)  · Other lack of cordination (R27.8)  · Difficulty in walking, Not elsewhere classified (R26.2)   Precautions/Allergies:        falls, aspiration,  Pcn [penicillins]       ASSESSMENT:   Ms. Sherrell Cooper was admitted for the above diagnosis. Pt presents supine upon arrival. Pt participated in exercises using LUE to increase strength and performed some self feeding tasks to regain independence. Pt required min assist for drink to mouth at this time. She did not want to eat any of her lunch tray at this time. Pt noted to have delayed movements in LUE. No activities or exercises performed to RUE due to being severely contracted and painful at times. Pt left with all needs in reach and lunch tray in front of her. Pt is to be discharged to SNF today. This section established at most recent assessment   PROBLEM LIST (Impairments causing functional limitations):  1. Decreased Strength  2. Decreased ADL/Functional Activities  3.  Decreased Transfer Abilities  4. Decreased Ambulation Ability/Technique  5. Decreased Balance  6. Decreased Activity Tolerance  7. Decreased Cognition    INTERVENTIONS PLANNED: (Benefits and precautions of occupational therapy have been discussed with the patient.)  1. Activities of daily living training  2. Balance training  3. Therapeutic activity  4. Therapeutic exercise  5. Wheelchair management  6. Safety training      TREATMENT PLAN: Frequency/Duration: Follow patient 2-3x per week to address above goals. Rehabilitation Potential For Stated Goals: FAIR      RECOMMENDED REHABILITATION/EQUIPMENT: (at time of discharge pending progress): Continue Skilled Therapy. 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 The patient was unresponsive in the ER, and she met SIRS criteria. She was admitted for sepsis and possible new CVA. 8/18 - The patient is more alert today. Yelling out that she wants water. Denies pain. Past Medical History/Comorbidities:   Ms. Dianne Momin  has a past medical history of Calculus of kidney; Diabetes ([address] 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 ([address] (1/26/2016); Hemiplegia affecting dominant side ([address] (1/26/2016); Hypertension; Mixed hyperlipidemia (1/26/2016); Neuropathy in diabetes ([address] (1/26/2016); Overactive bladder (1/26/2016); Psychiatric disorder; and Stroke [facility]). Ms. Dianne Momin  has a past surgical history that includes orthopaedic (2006) and heent (1950's).   Social History/Living Environment:   Home Environment: [address] [place] [address] Name: manna  # Steps to Enter: 0  One/Two Story Residence: One story  Living Alone: No  Support Systems: Child(frederic), Skilled nursing facility  Patient Expects to be Discharged to[de-identified] Skilled nursing facility  Current DME Used/Available at Home: Wheelchair  Tub or Shower Type: Shower  Prior Level of Function/Work/Activity:  long time resident of SNF, suspect needs help with all ADLs, but reports she fed herself, self propelled in wheelchair, non ambulatory  Dominant Side:         LEFT, since R Dominant CVA   Number of Personal Factors/Comorbidities that affect the Plan of Care: Expanded review of therapy/medical records (1-2):  MODERATE COMPLEXITY   ASSESSMENT OF OCCUPATIONAL PERFORMANCE[de-identified]   Activities of Daily Living:           Basic ADLs (From Assessment) Complex ADLs (From Assessment)   Basic ADL  Feeding: Minimum assistance, Additional time (with 1:1 feeding per SLP, 90 degrees)  Oral Facial Hygiene/Grooming: Maximum assistance  Bathing: Maximum assistance  Upper Body Dressing: Maximum assistance  Lower Body Dressing: Total assistance  Toileting: Total assistance, Adaptive equipment (muñoz in place) Instrumental ADL  Meal Preparation: Total assistance  Homemaking:  Total assistance  Medication Management: Total assistance  Financial Management: Total assistance   Grooming/Bathing/Dressing Activities of Daily Living     Cognitive Retraining  Safety/Judgement: Decreased awareness of environment     Feeding  Drink to Mouth: Minimum assistance                            Most Recent Physical Functioning:   Gross Assessment:                  Posture:     Balance:  Sitting: Impaired  Sitting - Static: Fair (occasional) Bed Mobility:     Wheelchair Mobility:     Transfers:                 Patient Vitals for the past 6 hrs:   BP SpO2 Pulse   08/22/17 0706 146/72 94 % 78   08/22/17 1229 150/75 94 % 78        Mental Status  Neurologic State: Alert, Confused  Orientation Level: Oriented to person  Cognition: Decreased attention/concentration, Decreased command following  Perception: Appears intact  Perseveration: No perseveration noted  Safety/Judgement: Decreased awareness of environment                               Physical Skills Involved:  1. Range of Motion  2. Balance  3. Strength  4. Activity Tolerance  5. Sensation Cognitive Skills Affected (resulting in the inability to perform in a timely and safe manner):  1. Perception  2. Executive Function  3. Sustained Attention  4. Divided Attention  5. Comprehension  6. Expression Psychosocial Skills Affected:  1. Habits/Routines  2. Environmental Adaptation  3. Emotional Regulation  4. Self-Awareness  5. Awareness of Others   Number of elements that affect the Plan of Care: 5+:  HIGH COMPLEXITY   CLINICAL DECISION MAKIN[address] [place] [address] AM-PAC 6 Clicks   Daily Activity Inpatient Short Form  How much help from another person does the patient currently need. .. Total A Lot A Little None   1. Putting on and taking off regular lower body clothing? [X] 1   [ ] 2   [ ] 3   [ ] 4   2. Bathing (including washing, rinsing, drying)? [X] 1   [ ] 2   [ ] 3   [ ] 4   3. Toileting, which includes using toilet, bedpan or urinal?   [X] 1   [ ] 2   [ ] 3   [ ] 4   4. Putting on and taking off regular upper body clothing? [X] 1   [ ] 2   [ ] 3   [ ] 4   5. Taking care of personal grooming such as brushing teeth? [X] 1   [ ] 2   [ ] 3   [ ] 4   6. Eating meals? [X] 1   [ ] 2   [ ] 3   [ ] 4   © , Trustees of [address] [place] [address], under license to [employer]. All rights reserved    Score:  Initial: 6, completed 2017 Most Recent: X (Date: -- )     Interpretation of Tool:  Represents activities that are increasingly more difficult (i.e. Bed mobility, Transfers, Gait).        Score 24 23 22-20 19-15 14-10 9-7 6       Modifier CH CI CJ CK CL CM CN         · Self Care:               - CURRENT STATUS:    CN - 100% impaired, limited or restricted               - GOAL STATUS:           CM - 80%-99% impaired, limited or restricted               - D/C STATUS:                       ---------------To be determined---------------  [place] / Product Type: Medicare /       Medical Necessity:     · Patient is expected to demonstrate progress in coordination and functional technique to increase independence with self feeding and simple grooming tasks. Reason for Services/Other Comments:  · Patient continues to require skilled intervention due to s/p above and decreased simple ADLs and mobility. Use of outcome tool(s) and clinical judgement create a POC that gives a: LOW COMPLEXITY             TREATMENT:   (In addition to Assessment/Re-Assessment sessions the following treatments were rendered)      Pre-treatment Symptoms/Complaints:  Agreeable to OT assessment  Pain: Initial:   Pain Intensity 1: 0  Post Session:  same      Therapeutic Activity: (20 minutes): Therapeutic activities including self feeding tasks and UE exercises to improve mobility, strength and coordination. Required moderate assistance to promote motor control of left, upper extremity(s). UE Exercises (AROM and PROM) Date:  8/22/2017 Date:   Date:     Activity/Exercise Parameters Parameters Parameters   Shoulder Flexion 10 reps PROM     Elbow Flexion 10 reps AROM     Pro/Supination 10 reps AROM     Opposition 5 reps AROM                                  Braces/Orthotics/Lines/Etc:   · O2 Device: Nasal cannula  Treatment/Session Assessment:    · Response to Treatment:  Tolerated well  · Interdisciplinary Collaboration:  · Certified Occupational Therapy Assistant and Registered Nurse  · After treatment position/precautions:  · Supine in bed, Bed/Chair-wheels locked, Bed in low position and Call light within reach  · Compliance with Program/Exercises: Will assess as treatment progresses. Compliant today. · Recommendations/Intent for next treatment session: \"Next visit will focus on reduction in assistance provided\".   Total Treatment Duration:  30 minsOT Patient Time In/Time Out  Time In: 1135  Time Out: [place]

## 2017-08-23 ENCOUNTER — PATIENT OUTREACH (OUTPATIENT)
Dept: CASE MANAGEMENT | Age: 82
End: 2017-08-23

## 2017-08-23 LAB
BACTERIA SPEC CULT: NORMAL
BACTERIA SPEC CULT: NORMAL
SERVICE CMNT-IMP: NORMAL
SERVICE CMNT-IMP: NORMAL

## 2017-08-23 NOTE — PROGRESS NOTES
Per [facility] Care patient discharged back to [facility] ([facility]) 08/22/2017. Patient resides at the facility long term. No Transitions of Care Outreach due to patient discharge disposition. This note will not be viewable in [address]

## 2017-09-24 ENCOUNTER — APPOINTMENT (OUTPATIENT)
Dept: CT IMAGING | Age: 82
DRG: 698 | End: 2017-09-24
Attending: EMERGENCY MEDICINE
Payer: MEDICARE

## 2017-09-24 ENCOUNTER — HOSPITAL ENCOUNTER (INPATIENT)
Age: 82
LOS: 5 days | Discharge: NURSING, REHAB OR LONG-TERM CARE | DRG: 698 | End: 2017-09-29
Attending: EMERGENCY MEDICINE | Admitting: HOSPITALIST
Payer: MEDICARE

## 2017-09-24 ENCOUNTER — APPOINTMENT (OUTPATIENT)
Dept: GENERAL RADIOLOGY | Age: 82
DRG: 698 | End: 2017-09-24
Attending: EMERGENCY MEDICINE
Payer: MEDICARE

## 2017-09-24 DIAGNOSIS — A41.9 SEPSIS, DUE TO UNSPECIFIED ORGANISM: ICD-10-CM

## 2017-09-24 DIAGNOSIS — N39.0 URINARY TRACT INFECTION ASSOCIATED WITH INDWELLING URETHRAL CATHETER, SUBSEQUENT ENCOUNTER: Primary | ICD-10-CM

## 2017-09-24 DIAGNOSIS — T83.511D URINARY TRACT INFECTION ASSOCIATED WITH INDWELLING URETHRAL CATHETER, SUBSEQUENT ENCOUNTER: Primary | ICD-10-CM

## 2017-09-24 LAB
ALBUMIN SERPL-MCNC: 2.8 G/DL (ref 3.2–4.6)
ALBUMIN/GLOB SERPL: 0.6 {RATIO} (ref 1.2–3.5)
ALP SERPL-CCNC: 113 U/L (ref 50–136)
ALT SERPL-CCNC: 19 U/L (ref 12–65)
ANION GAP SERPL CALC-SCNC: 11 MMOL/L (ref 7–16)
AST SERPL-CCNC: 17 U/L (ref 15–37)
BILIRUB SERPL-MCNC: 0.9 MG/DL (ref 0.2–1.1)
BUN SERPL-MCNC: 30 MG/DL (ref 8–23)
CALCIUM SERPL-MCNC: 9.5 MG/DL (ref 8.3–10.4)
CHLORIDE SERPL-SCNC: 103 MMOL/L (ref 98–107)
CO2 SERPL-SCNC: 26 MMOL/L (ref 21–32)
CREAT SERPL-MCNC: 1.66 MG/DL (ref 0.6–1)
ERYTHROCYTE [DISTWIDTH] IN BLOOD BY AUTOMATED COUNT: 14.3 % (ref 11.9–14.6)
GLOBULIN SER CALC-MCNC: 4.7 G/DL (ref 2.3–3.5)
GLUCOSE SERPL-MCNC: 277 MG/DL (ref 65–100)
HCT VFR BLD AUTO: 42.6 % (ref 35.8–46.3)
HGB BLD-MCNC: 13.9 G/DL (ref 11.7–15.4)
LACTATE BLD-SCNC: 2.9 MMOL/L (ref 0.5–1.9)
LACTATE SERPL-SCNC: 3 MMOL/L (ref 0.4–2)
LIPASE SERPL-CCNC: 61 U/L (ref 73–393)
MCH RBC QN AUTO: 28.9 PG (ref 26.1–32.9)
MCHC RBC AUTO-ENTMCNC: 32.6 G/DL (ref 31.4–35)
MCV RBC AUTO: 88.6 FL (ref 79.6–97.8)
PLATELET # BLD AUTO: 305 K/UL (ref 150–450)
PMV BLD AUTO: 11.4 FL (ref 10.8–14.1)
POTASSIUM SERPL-SCNC: 4.2 MMOL/L (ref 3.5–5.1)
PROCALCITONIN SERPL-MCNC: 0.2 NG/ML
PROT SERPL-MCNC: 7.5 G/DL (ref 6.3–8.2)
RBC # BLD AUTO: 4.81 M/UL (ref 4.05–5.25)
SODIUM SERPL-SCNC: 140 MMOL/L (ref 136–145)
TROPONIN I BLD-MCNC: 0 NG/ML (ref 0.02–0.05)
WBC # BLD AUTO: 28.6 K/UL (ref 4.3–11.1)

## 2017-09-24 PROCEDURE — 93005 ELECTROCARDIOGRAM TRACING: CPT | Performed by: HOSPITALIST

## 2017-09-24 PROCEDURE — 65270000029 HC RM PRIVATE

## 2017-09-24 PROCEDURE — 87077 CULTURE AEROBIC IDENTIFY: CPT | Performed by: EMERGENCY MEDICINE

## 2017-09-24 PROCEDURE — 74011000250 HC RX REV CODE- 250: Performed by: HOSPITALIST

## 2017-09-24 PROCEDURE — 87040 BLOOD CULTURE FOR BACTERIA: CPT | Performed by: EMERGENCY MEDICINE

## 2017-09-24 PROCEDURE — 87088 URINE BACTERIA CULTURE: CPT | Performed by: HOSPITALIST

## 2017-09-24 PROCEDURE — 74011000258 HC RX REV CODE- 258: Performed by: HOSPITALIST

## 2017-09-24 PROCEDURE — 83605 ASSAY OF LACTIC ACID: CPT

## 2017-09-24 PROCEDURE — 77030005518 HC CATH URETH FOL 2W BARD -B

## 2017-09-24 PROCEDURE — 96374 THER/PROPH/DIAG INJ IV PUSH: CPT | Performed by: EMERGENCY MEDICINE

## 2017-09-24 PROCEDURE — 80053 COMPREHEN METABOLIC PANEL: CPT | Performed by: EMERGENCY MEDICINE

## 2017-09-24 PROCEDURE — 87205 SMEAR GRAM STAIN: CPT | Performed by: EMERGENCY MEDICINE

## 2017-09-24 PROCEDURE — 86580 TB INTRADERMAL TEST: CPT | Performed by: HOSPITALIST

## 2017-09-24 PROCEDURE — 84484 ASSAY OF TROPONIN QUANT: CPT

## 2017-09-24 PROCEDURE — 81001 URINALYSIS AUTO W/SCOPE: CPT | Performed by: HOSPITALIST

## 2017-09-24 PROCEDURE — 87186 SC STD MICRODIL/AGAR DIL: CPT | Performed by: HOSPITALIST

## 2017-09-24 PROCEDURE — 87086 URINE CULTURE/COLONY COUNT: CPT | Performed by: HOSPITALIST

## 2017-09-24 PROCEDURE — 87641 MR-STAPH DNA AMP PROBE: CPT | Performed by: EMERGENCY MEDICINE

## 2017-09-24 PROCEDURE — 83690 ASSAY OF LIPASE: CPT | Performed by: EMERGENCY MEDICINE

## 2017-09-24 PROCEDURE — 84145 PROCALCITONIN (PCT): CPT | Performed by: EMERGENCY MEDICINE

## 2017-09-24 PROCEDURE — 36415 COLL VENOUS BLD VENIPUNCTURE: CPT | Performed by: HOSPITALIST

## 2017-09-24 PROCEDURE — 74011000302 HC RX REV CODE- 302: Performed by: HOSPITALIST

## 2017-09-24 PROCEDURE — 99285 EMERGENCY DEPT VISIT HI MDM: CPT | Performed by: EMERGENCY MEDICINE

## 2017-09-24 PROCEDURE — 85027 COMPLETE CBC AUTOMATED: CPT | Performed by: EMERGENCY MEDICINE

## 2017-09-24 PROCEDURE — 83605 ASSAY OF LACTIC ACID: CPT | Performed by: EMERGENCY MEDICINE

## 2017-09-24 PROCEDURE — 83605 ASSAY OF LACTIC ACID: CPT | Performed by: HOSPITALIST

## 2017-09-24 PROCEDURE — 74011250637 HC RX REV CODE- 250/637: Performed by: HOSPITALIST

## 2017-09-24 PROCEDURE — 74011250636 HC RX REV CODE- 250/636: Performed by: EMERGENCY MEDICINE

## 2017-09-24 PROCEDURE — 70450 CT HEAD/BRAIN W/O DYE: CPT

## 2017-09-24 PROCEDURE — 74011250636 HC RX REV CODE- 250/636: Performed by: HOSPITALIST

## 2017-09-24 PROCEDURE — 74022 RADEX COMPL AQT ABD SERIES: CPT

## 2017-09-24 PROCEDURE — 51702 INSERT TEMP BLADDER CATH: CPT | Performed by: EMERGENCY MEDICINE

## 2017-09-24 PROCEDURE — 0T2BX0Z CHANGE DRAINAGE DEVICE IN BLADDER, EXTERNAL APPROACH: ICD-10-PCS | Performed by: EMERGENCY MEDICINE

## 2017-09-24 PROCEDURE — 87186 SC STD MICRODIL/AGAR DIL: CPT | Performed by: EMERGENCY MEDICINE

## 2017-09-24 RX ORDER — METOPROLOL SUCCINATE 25 MG/1
25 TABLET, EXTENDED RELEASE ORAL DAILY
Status: DISCONTINUED | OUTPATIENT
Start: 2017-09-25 | End: 2017-09-28

## 2017-09-24 RX ORDER — SODIUM CHLORIDE 9 MG/ML
50 INJECTION, SOLUTION INTRAVENOUS CONTINUOUS
Status: DISCONTINUED | OUTPATIENT
Start: 2017-09-24 | End: 2017-09-29 | Stop reason: HOSPADM

## 2017-09-24 RX ORDER — ASPIRIN 81 MG/1
81 TABLET ORAL DAILY
Status: DISCONTINUED | OUTPATIENT
Start: 2017-09-25 | End: 2017-09-29 | Stop reason: HOSPADM

## 2017-09-24 RX ORDER — LEVOFLOXACIN 5 MG/ML
750 INJECTION, SOLUTION INTRAVENOUS
Status: DISCONTINUED | OUTPATIENT
Start: 2017-09-24 | End: 2017-09-24

## 2017-09-24 RX ORDER — SODIUM CHLORIDE 0.9 % (FLUSH) 0.9 %
5-10 SYRINGE (ML) INJECTION AS NEEDED
Status: DISCONTINUED | OUTPATIENT
Start: 2017-09-24 | End: 2017-09-29 | Stop reason: HOSPADM

## 2017-09-24 RX ORDER — HEPARIN SODIUM 5000 [USP'U]/ML
5000 INJECTION, SOLUTION INTRAVENOUS; SUBCUTANEOUS EVERY 8 HOURS
Status: DISCONTINUED | OUTPATIENT
Start: 2017-09-24 | End: 2017-09-29 | Stop reason: HOSPADM

## 2017-09-24 RX ORDER — ONDANSETRON 2 MG/ML
4 INJECTION INTRAMUSCULAR; INTRAVENOUS
Status: DISCONTINUED | OUTPATIENT
Start: 2017-09-24 | End: 2017-09-29 | Stop reason: HOSPADM

## 2017-09-24 RX ORDER — DULOXETIN HYDROCHLORIDE 60 MG/1
60 CAPSULE, DELAYED RELEASE ORAL DAILY
Status: DISCONTINUED | OUTPATIENT
Start: 2017-09-25 | End: 2017-09-29 | Stop reason: HOSPADM

## 2017-09-24 RX ORDER — SODIUM CHLORIDE 0.9 % (FLUSH) 0.9 %
5-10 SYRINGE (ML) INJECTION AS NEEDED
Status: DISCONTINUED | OUTPATIENT
Start: 2017-09-24 | End: 2017-09-24

## 2017-09-24 RX ORDER — AMOXICILLIN 250 MG
1 CAPSULE ORAL DAILY
Status: DISCONTINUED | OUTPATIENT
Start: 2017-09-25 | End: 2017-09-29 | Stop reason: HOSPADM

## 2017-09-24 RX ORDER — INSULIN GLARGINE 100 [IU]/ML
10 INJECTION, SOLUTION SUBCUTANEOUS DAILY
Status: DISCONTINUED | OUTPATIENT
Start: 2017-09-25 | End: 2017-09-29 | Stop reason: HOSPADM

## 2017-09-24 RX ORDER — HYDROCODONE BITARTRATE AND ACETAMINOPHEN 5; 325 MG/1; MG/1
1 TABLET ORAL
Status: DISCONTINUED | OUTPATIENT
Start: 2017-09-24 | End: 2017-09-29 | Stop reason: HOSPADM

## 2017-09-24 RX ORDER — SODIUM CHLORIDE 0.9 % (FLUSH) 0.9 %
5-10 SYRINGE (ML) INJECTION EVERY 8 HOURS
Status: DISCONTINUED | OUTPATIENT
Start: 2017-09-24 | End: 2017-09-29 | Stop reason: HOSPADM

## 2017-09-24 RX ORDER — INSULIN LISPRO 100 [IU]/ML
INJECTION, SOLUTION INTRAVENOUS; SUBCUTANEOUS
Status: DISCONTINUED | OUTPATIENT
Start: 2017-09-25 | End: 2017-09-29 | Stop reason: HOSPADM

## 2017-09-24 RX ORDER — LISINOPRIL 5 MG/1
10 TABLET ORAL DAILY
Status: DISCONTINUED | OUTPATIENT
Start: 2017-09-25 | End: 2017-09-27

## 2017-09-24 RX ORDER — NALOXONE HYDROCHLORIDE 0.4 MG/ML
0.4 INJECTION, SOLUTION INTRAMUSCULAR; INTRAVENOUS; SUBCUTANEOUS AS NEEDED
Status: DISCONTINUED | OUTPATIENT
Start: 2017-09-24 | End: 2017-09-29 | Stop reason: HOSPADM

## 2017-09-24 RX ORDER — DIPHENHYDRAMINE HYDROCHLORIDE 50 MG/ML
12.5 INJECTION, SOLUTION INTRAMUSCULAR; INTRAVENOUS
Status: DISCONTINUED | OUTPATIENT
Start: 2017-09-24 | End: 2017-09-29 | Stop reason: HOSPADM

## 2017-09-24 RX ORDER — HYDROMORPHONE HYDROCHLORIDE 1 MG/ML
0.2 INJECTION, SOLUTION INTRAMUSCULAR; INTRAVENOUS; SUBCUTANEOUS
Status: DISCONTINUED | OUTPATIENT
Start: 2017-09-24 | End: 2017-09-29 | Stop reason: HOSPADM

## 2017-09-24 RX ORDER — ATORVASTATIN CALCIUM 40 MG/1
80 TABLET, FILM COATED ORAL
Status: DISCONTINUED | OUTPATIENT
Start: 2017-09-24 | End: 2017-09-29 | Stop reason: HOSPADM

## 2017-09-24 RX ADMIN — LEVOFLOXACIN 750 MG: 5 INJECTION, SOLUTION INTRAVENOUS at 18:38

## 2017-09-24 RX ADMIN — HEPARIN SODIUM 5000 UNITS: 5000 INJECTION, SOLUTION INTRAVENOUS; SUBCUTANEOUS at 20:26

## 2017-09-24 RX ADMIN — SODIUM CHLORIDE 1714 ML: 900 INJECTION, SOLUTION INTRAVENOUS at 18:36

## 2017-09-24 RX ADMIN — VANCOMYCIN HYDROCHLORIDE 1000 MG: 1 INJECTION, POWDER, LYOPHILIZED, FOR SOLUTION INTRAVENOUS at 20:36

## 2017-09-24 RX ADMIN — TUBERCULIN PURIFIED PROTEIN DERIVATIVE 5 UNITS: 5 INJECTION, SOLUTION INTRADERMAL at 20:27

## 2017-09-24 RX ADMIN — AZTREONAM 1 G: 1 INJECTION, POWDER, LYOPHILIZED, FOR SOLUTION INTRAMUSCULAR; INTRAVENOUS at 20:26

## 2017-09-24 RX ADMIN — ATORVASTATIN CALCIUM 80 MG: 40 TABLET, FILM COATED ORAL at 20:27

## 2017-09-24 NOTE — H&P
HOSPITALIST H&P/CONSULT  NAME:  Agustina Watkins   Age:  [de-identified] y.o.  :   1935   MRN:   [id number]  PCP: Jerry Obrien MD  Consulting MD:  Treatment Team: Attending Provider: Leisa Hollis MD; Primary Nurse: Halina Hernandez RN  HPI: history obtain from ED and  Daughter  This is a [de-identified] yo fml with past medical of multiple cva  In the past most recent a year ago sent from nursing home  For evaluation  Of unresponsiveness and difficulty  Controlling her blood pressure on medication. The patient was  Discharge one month ago with a muñoz catheter due to overactive bladder dysfunction. In  The ED the muñoz bag  was found to be empty  With  Poor drainage . At that time the patient had a distended abdomen or bladder the muñoz was removed and  Replaced and over 1.5 liter came out. Surrounding the tip of the catheter were organic residue. Following the removal her blood pressure stabilized, per daughter at baseline the patient is aphasic and has residual weakness. She last saw her mother on Thursday and at that time she felt that  She was looking extremely dehydrated and wanted to speak to the charge nurse about her care. In the ED beside   The elevated BP  The pt was found to have a WBC  29k , given her history of cva a head ct was done that showed no acute ischemia . She was started on broad spectrum antibiotics and medicine was called . 10 point ROS done and is negative except as noted in HPI.   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 Taking? DULoxetine (CYMBALTA) 60 mg capsule   No No   Sig: Take 1 Cap by mouth daily. aspirin delayed-release 81 mg tablet 2017 at Unknown time  No Yes   Sig: Take 1 Tab by mouth daily. atorvastatin (LIPITOR) 80 mg tablet   No No   Si Tab by Per G Tube route nightly. insulin glargine (LANTUS) 100 unit/mL injection   No No   Sig: 10 Units by SubCUTAneous route daily. lisinopril (PRINIVIL, ZESTRIL) 10 mg tablet   No No   Sig: Take 1 Tab by mouth daily. metoprolol succinate (TOPROL-XL) 25 mg XL tablet   No No   Sig: Take 1 Tab by mouth daily. Facility-Administered Medications: None     Home meds reconciled.   Allergies   Allergen Reactions    Pcn [Penicillins] Angioedema     Pt tolerated rocephin 2015      Social History   Substance Use Topics    Smoking status: Former Smoker    Smokeless tobacco: Not on file      Comment: smoked from 15 yoa to about 78 yoa    Alcohol use No      Family History   Problem Relation Age of Onset    Other Other      aneurysm    Cancer Other      cancer    Thyroid Disease Other     Hypertension Other     Diabetes Other     Dementia Other     Heart Disease Other     Dementia Daughter      factor 5, visual heart murmur    Hypertension Daughter     Diabetes Daughter     Hypertension Mother     Heart Disease Mother     Alzheimer Father     Hypertension Father     Hypertension Paternal Aunt     Heart Disease Paternal Uncle     Hypertension Paternal Uncle     Other Maternal Grandmother      aneurysm    Hypertension Maternal Grandmother       Immunization History   Administered Date(s) Administered    TB Skin Test (PPD) Intradermal 2015, 2016, 2017     Objective:     Visit Vitals    BP (!) 208/138 (BP 1 Location: Left arm, BP Patient Position: At rest)    Pulse 91    Temp 98.3 °F (36.8 °C)  Ht 5' 7\" (1.702 m)    Wt 85.7 kg (189 lb)    SpO2 95%    BMI 29.6 kg/m2      Temp (24hrs), Av.3 °F (36.8 °C), Min:98.3 °F (36.8 °C), Max:98.3 °F (36.8 °C)    Oxygen Therapy  O2 Sat (%): 95 % (17 1528)  O2 Device: Room air (17 1528)  Physical Exam:  General:    Alert, cooperative, no distress   Head:   NCAT. No obvious deformity  Nose:  Nares normal. No drainage  Lungs:   CTABL. No wheezing/rhonchi/rales  Heart:   RRR. Abdomen:   S/nt/nd. Bowel sounds normal.   Extremities: No cyanosis. Skin:     No rashes or lesions. Not Jaundiced  Neurologic:  weakness   But poor cooperation at the time      Data Review:   Recent Results (from the past 24 hour(s))   POC TROPONIN-I    Collection Time: 17  5:08 PM   Result Value Ref Range    Troponin-I (POC) 0 (L) 0.02 - 0.05 ng/ml   POC LACTIC ACID    Collection Time: 17  5:12 PM   Result Value Ref Range    Lactic Acid (POC) 2.9 (H) 0.5 - 1.9 mmol/L   CBC W/O DIFF    Collection Time: 17  5:14 PM   Result Value Ref Range    WBC 28.6 (H) 4.3 - 11.1 K/uL    RBC 4.81 4.05 - 5.25 M/uL    HGB 13.9 11.7 - 15.4 g/dL    HCT 42.6 35.8 - 46.3 %    MCV 88.6 79.6 - 97.8 FL    MCH 28.9 26.1 - 32.9 PG    MCHC 32.6 31.4 - 35.0 g/dL    RDW 14.3 11.9 - 14.6 %    PLATELET 169 961 - 209 K/uL    MPV 11.4 10.8 - 32.6 FL   METABOLIC PANEL, COMPREHENSIVE    Collection Time: 17  5:14 PM   Result Value Ref Range    Sodium 140 136 - 145 mmol/L    Potassium 4.2 3.5 - 5.1 mmol/L    Chloride 103 98 - 107 mmol/L    CO2 26 21 - 32 mmol/L    Anion gap 11 7 - 16 mmol/L    Glucose 277 (H) 65 - 100 mg/dL    BUN 30 (H) 8 - 23 MG/DL    Creatinine 1.66 (H) 0.6 - 1.0 MG/DL    GFR est AA 38 (L) >60 ml/min/1.73m2    GFR est non-AA 32 (L) >60 ml/min/1.73m2    Calcium 9.5 8.3 - 10.4 MG/DL    Bilirubin, total 0.9 0.2 - 1.1 MG/DL    ALT (SGPT) 19 12 - 65 U/L    AST (SGOT) 17 15 - 37 U/L    Alk.  phosphatase 113 50 - 136 U/L    Protein, total 7.5 6.3 - 8.2 g/dL    Albumin 2.8 (L) 3.2 - 4.6 g/dL    Globulin 4.7 (H) 2.3 - 3.5 g/dL    A-G Ratio 0.6 (L) 1.2 - 3.5     LIPASE    Collection Time: 09/24/17  5:14 PM   Result Value Ref Range    Lipase 61 (L) 73 - 393 U/L     Imaging /Procedures /Studies:  I personally reviewed all labs, imaging, and other studies this admission:  CXR Results  (Last 48 hours)    None        CT Results  (Last 48 hours)               09/24/17 1654  CT HEAD WO CONT Final result    Impression:  Impression: Supratentorial microischemia, remote left basal ganglia CVA. Note: If a subtle CVA is suspected, MRI would be more definitive if clinically   warranted. Narrative:  CT scan of the brain 9/24/2017   Scanning was performed within 24 hours of arrival to the facility   Comparison: 8/20/2017   The MAA was adjusted using dose modulation to reduce patient radiation exposure. Indication: Unresponsive   Findings: The brain parenchyma and ventricular structures are remarkable for   subcortical and periventricular white matter lucency. There is a stable CSF   density within the left basal ganglia  white matter. . There is normal white-grey   matter differentiation. There are no interval mass lesions, hemorrhage, or   evidence of an acute stroke. The calvarium and the sinuses are unremarkable. Assessment and Plan:      Active Hospital Problems    Diagnosis Date Noted    UTI (urinary tract infection) 09/24/2017    Acute encephalopathy 11/05/2015       PLAN  [de-identified] yo fml presenting with acute change in mental status  Due to  Urinary retention and infection    Urinary  Tract  Infection  Sepsis   Start aztreonam  For coverage prior culture was sensitive  Will de escalate following  Culture    Acute change in mental status   Due to infection  This has improved  In the  ED  Cont  Antibiotics    Acute renal  Failure   Due to dehydration   Cont IV fluids     Urinary overactive bladder  Jeong catheter changed and placed appropriately    HTN  Resume home meds since bp has improves    DM  Sliding scale    Hold oral  Agents at this time     Depression  Resume cymbalta       FEN:  Thicken nectar   DVT ppx:  Heparin sc   Code status:    Estimated LOS:   Family would like the patient to be placed at  another facility    Risk assessment:    Plan of care discussed with: patient     Signed By: Lou Adler MD     September 24, 2017

## 2017-09-24 NOTE — PROGRESS NOTES
TRANSFER - IN REPORT:    Verbal report received from [facility]  being received from ER for routine progression of care      Report consisted of patients Situation, Background, Assessment and   Recommendations(SBAR). Information from the following report(s) SBAR, Intake/Output and MAR was reviewed with the receiving nurse. Opportunity for questions and clarification was provided. Assessment completed upon patients arrival to unit and care assumed.

## 2017-09-24 NOTE — ED NOTES
TRANSFER - OUT REPORT:    Verbal report given to Celia(name) on Siri Myers  being transferred to Ortho(unit) for routine progression of care       Report consisted of patients Situation, Background, Assessment and   Recommendations(SBAR). Information from the following report(s) SBAR was reviewed with the receiving nurse. Lines:   Peripheral IV 09/24/17 Left Hand (Active)   Site Assessment Clean, dry, & intact 9/24/2017  6:57 PM   Phlebitis Assessment 0 9/24/2017  6:57 PM   Infiltration Assessment 0 9/24/2017  6:57 PM   Dressing Status Clean, dry, & intact 9/24/2017  6:57 PM        Opportunity for questions and clarification was provided.       Patient transported with:   Monitor

## 2017-09-24 NOTE — ED NOTES
Guerita,  called to say that urine specimen was too gelatinous to use. Urine culture ordered at this time.

## 2017-09-24 NOTE — ED NOTES
Pt. Muñoz changed; inserted 16 [place] muñoz. Thick, green copious fluid and mostly pus gushed out of patient vagina. Pt. Is paralyzed on right side; no skin breakdown noted on buttocks. Pt. Urine is completely similar to gel and will muñoz slowly from muñoz. Pt. Will have her bladder irrigated. Pt. Has a splint  On her right hand. Pt. Does not walk at all according to family. According to family pt. Is on chopped meat diet with nectar thick fluids.

## 2017-09-24 NOTE — ED PROVIDER NOTES
HPI:  82-year-old female history of diabetes, hypertension, prior stroke sent here from nursing home with concern for high blood pressure and A. Fib. Patient has no history of A. Fib. Report is extremely limited. Medic report also limited per nursing staff. I did not send any EKG showing A. Fib. Per nursing staff when she arrives patient answering yes and no question and did ask for a blanket. At the time of my evaluation patient appeared more sleepy. She is arousable to voice however did not appear to follow commands. Unable to answer questions. 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)      Past Surgical History:   Procedure Laterality Date    HX HEENT  1950's    left sinus    HX ORTHOPAEDIC  2006    hip replacement and filter in right leg     Prior to Admission Medications   Prescriptions Last Dose Informant Patient Reported? Taking? DULoxetine (CYMBALTA) 60 mg capsule   No No   Sig: Take 1 Cap by mouth daily. aspirin delayed-release 81 mg tablet 2017 at Unknown time  No Yes   Sig: Take 1 Tab by mouth daily. atorvastatin (LIPITOR) 80 mg tablet   No No   Si Tab by Per G Tube route nightly. ciprofloxacin HCl (CIPRO) 250 mg tablet   No No   Sig: Take 1 Tab by mouth two (2) times a day.    insulin glargine (LANTUS) 100 unit/mL injection   No No   Sig: 10 Units by SubCUTAneous route daily. lisinopril (PRINIVIL, ZESTRIL) 10 mg tablet   No No   Sig: Take 1 Tab by mouth daily. metoprolol succinate (TOPROL-XL) 25 mg XL tablet   No No   Sig: Take 1 Tab by mouth daily. Facility-Administered Medications: None         Adult Exam   General: sleeping but easily arousable. Head: normocephalic, atraumatic  ENT: dry  mucous membranes  Neck: supple, non-tender; full range of motion  Cardiovascular: regular rate and rhythm, normal peripheral perfusion, no edema  Respiratory: normal respirations. No tachypnea, retraction noted. she is not following command was taken deep breath however no obvious wheezing or crackles noted  Gastrointestinal:lower abdominal distention noted. Appear tender to palpation. May be secondary to stool versus enlarged bladder. Back: unable to fully assess however no obvious step-off or pain elicited at cervical, thoracic, lumbar spine. Musculoskeletal: normal range of motion, normal strength, no gross deformities  Pelvic stable  : Jeong noted. Jeong bag is empty. Neurological: opened eyes and looks at me however does not follow command. No obvious facial droops identified. Psychiatric:     MDM:  EKG obtained interpreted by me rate of 92 sinus rhythm low voltage normal axis. Normal QRS interval.  No signs of A. Fib. Not consistent with STEMI. Nonspecific T-wave changes. Asked nursing staff to switch out her Jeong. Review of her EMR showed that she was recently admitted for aphasia with workup that was negative for any acute stroke with urosepsis. Concern is for new onset stroke, persistent sepsis. Per EMR patient grew Proteus Miarbilus from her urine culture that was sensitive to Cipro. Appear to have been discharged to nursing home with Cipro. Nursing staff went to exchange the Jeong. As they pulled out the Jeong there was a large amount of purulent discharge that came pouring out from her vaginal, urethral area.    A new Jeong was placed. It drained over 1 L. Her abdominal distention has improved significantly. A blood pressure initially 208/138 now down to 139/77. She does not have hypoxia. She appeared more arousable at this time. Her urine is concerning for significant infection. Leukocytosis of 28,000 compared to 10,000 from discharge. Lactate of 2.9.  20 ml per kg of normal saline started. She is afebrile here. Concern is for urosepsis. Repeat CT scan of the head unremarkable. Repeat abdominal exam without guarding, rebound. Does not appear to be tender. No peritonitis. We'll also obtain chest x-ray, KUB. Do not suspect perforation however with limited history. Spoke with hospitalist.  Catarina Boyce start him on vancomycin, Levaquin at this time for treatment. Concern for hospital-acquired, indwelling Jeong related UTI with urosepsis. Blood culture urine culture sent.    ===================================================================  This patient is critically ill and there is a high probability of of imminent or life threatening deterioration in the patient's condition without immediate management. The nature of the patient's clinical problem is: uti with associated sepsis     I have spent at least 35 minutes in direct patient care, documentation, review of labs/xrays/old records, discussion with family and hospitalist    The time involved in the performance of separately reportable procedures was not counted toward critical care time.      Zeke Griffith MD; 9/24/2017 @7:08 PM  ===================================================================    Recent Results (from the past 8 hour(s))   POC TROPONIN-I    Collection Time: 09/24/17  5:08 PM   Result Value Ref Range    Troponin-I (POC) 0 (L) 0.02 - 0.05 ng/ml   POC LACTIC ACID    Collection Time: 09/24/17  5:12 PM   Result Value Ref Range    Lactic Acid (POC) 2.9 (H) 0.5 - 1.9 mmol/L   CBC W/O DIFF    Collection Time: 09/24/17  5:14 PM   Result Value Ref Range    WBC 28.6 (H) 4.3 - 11.1 K/uL    RBC 4.81 4.05 - 5.25 M/uL    HGB 13.9 11.7 - 15.4 g/dL    HCT 42.6 35.8 - 46.3 %    MCV 88.6 79.6 - 97.8 FL    MCH 28.9 26.1 - 32.9 PG    MCHC 32.6 31.4 - 35.0 g/dL    RDW 14.3 11.9 - 14.6 %    PLATELET 232 046 - 616 K/uL    MPV 11.4 10.8 - 24.0 FL   METABOLIC PANEL, COMPREHENSIVE    Collection Time: 09/24/17  5:14 PM   Result Value Ref Range    Sodium 140 136 - 145 mmol/L    Potassium 4.2 3.5 - 5.1 mmol/L    Chloride 103 98 - 107 mmol/L    CO2 26 21 - 32 mmol/L    Anion gap 11 7 - 16 mmol/L    Glucose 277 (H) 65 - 100 mg/dL    BUN 30 (H) 8 - 23 MG/DL    Creatinine 1.66 (H) 0.6 - 1.0 MG/DL    GFR est AA 38 (L) >60 ml/min/1.73m2    GFR est non-AA 32 (L) >60 ml/min/1.73m2    Calcium 9.5 8.3 - 10.4 MG/DL    Bilirubin, total 0.9 0.2 - 1.1 MG/DL    ALT (SGPT) 19 12 - 65 U/L    AST (SGOT) 17 15 - 37 U/L    Alk. phosphatase 113 50 - 136 U/L    Protein, total 7.5 6.3 - 8.2 g/dL    Albumin 2.8 (L) 3.2 - 4.6 g/dL    Globulin 4.7 (H) 2.3 - 3.5 g/dL    A-G Ratio 0.6 (L) 1.2 - 3.5     LIPASE    Collection Time: 09/24/17  5:14 PM   Result Value Ref Range    Lipase 61 (L) 73 - 393 U/L       Xr Abd Acute W 1 V Chest    Result Date: 9/24/2017  Acute abdomen series: 9/24/2017 INDICATION: Abdominal distention COMPARISON: 8/20/2017 Degenerative thoracolumbar scoliosis is noted. Heart is enlarged and the lung fields clear. Soft tissues are intact. Remote fracture deformity of the proximal right humerus is noted. There is no free air. Bowel gas pattern is is unremarkable. There are no abnormal masses. . Degenerative narrowing of the bilateral hips and right femoral Nelson pin is noted. Soft tissues are unremarkable. There are some subtle calculi overlying the left renal silhouette of which the largest measures 12.6 mm.      IMPRESSION: Possible left renal calculi, degenerative thoracic scoliosis, and nonspecific bowel gas pattern, cardiomegaly, clear lung fields    Ct Head Wo Cont    Result Date: 9/24/2017  CT scan of the brain 9/24/2017 Scanning was performed within 24 hours of arrival to the facility Comparison: 8/20/2017 The MAA was adjusted using dose modulation to reduce patient radiation exposure. Indication: Unresponsive Findings: The brain parenchyma and ventricular structures are remarkable for subcortical and periventricular white matter lucency. There is a stable CSF density within the left basal ganglia  white matter. . There is normal white-grey matter differentiation. There are no interval mass lesions, hemorrhage, or evidence of an acute stroke. The calvarium and the sinuses are unremarkable. Impression: Supratentorial microischemia, remote left basal ganglia CVA. Note: If a subtle CVA is suspected, MRI would be more definitive if clinically warranted. Dragon voice recognition software was used to create this note. Although the note has been reviewed and corrected where necessary, additional errors may have been overlooked and remain in the text.

## 2017-09-24 NOTE — IP AVS SNAPSHOT
Gabrielaoseas Garcia 
 
 
 [address] 
[phone] Patient: Olivia Rodriguez MRN: [id number] :1935 You are allergic to the following Allergen Reactions Pcn (Penicillins) Angioedema Pt tolerated rocephin 2015 Immunizations Administered for This Admission Name Date Influenza Vaccine (Quad) PF 2017 TB Skin Test (PPD) Intradermal 2017 Recent Documentation Height Weight BMI OB Status Smoking Status 1.702 m 85.7 kg 29.6 kg/m2 Postmenopausal Former Smoker Unresulted Labs Order Current Status CULTURE, BLOOD Preliminary result CULTURE, BLOOD Preliminary result CULTURE, BLOOD Preliminary result PLEASE READ & DOCUMENT PPD TEST IN 24 HRS Preliminary result Emergency Contacts Name Discharge Info Relation Home Work Mobile Deedee Mcclellan  Daughter [21] [phone] About your hospitalization You were admitted on:  2017 You last received care in the:  [facility] You were discharged on:  2017 Unit phone number:  [phone] Why you were hospitalized Your primary diagnosis was:  Sepsis (Hcc) Your diagnoses also included:  Acute Encephalopathy, Urinary Tract Infection Associated With Indwelling Urethral Catheter (Hcc), Diabetes Mellitus With Neurological Manifestation (Hcc), Hemiplegia Affecting Dominant Side (Hcc), Htn (Hypertension), Alan (Acute Kidney Injury) (Hcc) Providers Seen During Your Hospitalizations Provider Role Specialty Primary office phone Jennifer Knapp MD Attending Provider Emergency Medicine [phone] Mera Darnell MD Attending Provider Internal Medicine [phone] Your Primary Care Physician (PCP) Primary Care Physician Office Phone Office Fax Ester Webb [phone] Follow-up Information Follow up With Details Comments Contact Info MD Thomas Del Valle [facility] Ian Knapp [address] [phone] Current Discharge Medication List  
  
START taking these medications Dose & Instructions Dispensing Information Comments Morning Noon Evening Bedtime  
 potassium chloride 20 mEq tablet Commonly known as:  K-DUR, KLOR-CON Your last dose was: Your next dose is:    
   
   
 Dose:  40 mEq Take 2 Tabs by mouth daily. Quantity:  60 Tab Refills:  0 CONTINUE these medications which have CHANGED Dose & Instructions Dispensing Information Comments Morning Noon Evening Bedtime  
 ciprofloxacin HCl 500 mg tablet Commonly known as:  CIPRO What changed:   
- medication strength 
- how much to take Your last dose was: Your next dose is:    
   
   
 Dose:  500 mg Take 1 Tab by mouth two (2) times a day for 8 days. Quantity:  16 Tab Refills:  0  
     
   
   
   
  
 insulin glargine 100 unit/mL injection Commonly known as:  LANTUS What changed:  how much to take Your last dose was: Your next dose is:    
   
   
 Dose:  5 Units 5 Units by SubCUTAneous route daily. Quantity:  2 Vial  
Refills:  2  
     
   
   
   
  
 lisinopril 40 mg tablet Commonly known as:  Melvi Shows What changed:   
- medication strength 
- how much to take Your last dose was: Your next dose is:    
   
   
 Dose:  40 mg Take 1 Tab by mouth daily. Quantity:  30 Tab Refills:  11  
     
   
   
   
  
 metoprolol succinate 50 mg XL tablet Commonly known as:  TOPROL-XL What changed:   
- medication strength 
- how much to take Your last dose was: Your next dose is:    
   
   
 Dose:  50 mg Take 1 Tab by mouth daily. Quantity:  30 Tab Refills:  11  
     
   
   
   
  
  
 CONTINUE these medications which have NOT CHANGED Dose & Instructions Dispensing Information Comments Morning Noon Evening Bedtime  
 aspirin delayed-release 81 mg tablet Your last dose was: Your next dose is:    
   
   
 Dose:  81 mg Take 1 Tab by mouth daily. Quantity:  30 Tab Refills:  1  
     
   
   
   
  
 atorvastatin 80 mg tablet Commonly known as:  LIPITOR Your last dose was: Your next dose is:    
   
   
 Dose:  80 mg  
1 Tab by Per G Tube route nightly. Quantity:  30 Tab Refills:  0 DULoxetine 60 mg capsule Commonly known as:  CYMBALTA Your last dose was: Your next dose is:    
   
   
 Dose:  60 mg Take 1 Cap by mouth daily. Quantity:  90 Cap Refills:  2 Where to Get Your Medications Information on where to get these meds will be given to you by the nurse or doctor. ! Ask your nurse or doctor about these medications  
  ciprofloxacin HCl 500 mg tablet  
 insulin glargine 100 unit/mL injection  
 lisinopril 40 mg tablet  
 metoprolol succinate 50 mg XL tablet  
 potassium chloride 20 mEq tablet Discharge Instructions None Discharge Orders None "[employer]" Announcement We are excited to announce that we are making your provider's discharge notes available to you in "[employer]". You will see these notes when they are completed and signed by the physician that discharged you from your recent hospital stay. If you have any questions or concerns about any information you see in "[employer]", please call the Health Information Department where you were seen or reach out to your Primary Care Provider for more information about your plan of care. Introducing [facility]! Tyler Tucker introduces "[employer]" patient portal. Now you can access parts of your medical record, email your doctor's office, and request medication refills online. 1. In your internet browser, go to [email or web address] 2. Click on the First Time User? Click Here link in the Sign In box. You will see the New Member Sign Up page. 3. Enter your [employer] Access Code exactly as it appears below. You will not need to use this code after youve completed the sign-up process. If you do not sign up before the expiration date, you must request a new code. · [employer] Access Code: [email or web address] Expires: 11/15/2017  2:07 AM 
 
4. Enter the last four digits of your Social Security Number (xxxx) and Date of Birth (mm/dd/yyyy) as indicated and click Submit. You will be taken to the next sign-up page. 5. Create a [employer] ID. This will be your [employer] login ID and cannot be changed, so think of one that is secure and easy to remember. 6. Create a [employer] password. You can change your password at any time. 7. Enter your Password Reset Question and Answer. This can be used at a later time if you forget your password. 8. Enter your e-mail address. You will receive e-mail notification when new information is available in [address] 9. Click Sign Up. You can now view and download portions of your medical record. 10. Click the Download Summary menu link to download a portable copy of your medical information. If you have questions, please visit the Frequently Asked Questions section of the [employer] website. Remember, [employer] is NOT to be used for urgent needs. For medical emergencies, dial 911. Now available from your iPhone and Android! General Information Please provide this summary of care documentation to your next provider. Patient Signature:  ____________________________________________________________ Date:  ____________________________________________________________  
  
Giulia Dirk Provider Signature:  ____________________________________________________________ Date:  ____________________________________________________________

## 2017-09-24 NOTE — ED TRIAGE NOTES
EMS states pt. Was hypertensive and new onset a-fib; pt.is a resident at [facility]. Pt.denies chest pain, fever, or ill feeling.

## 2017-09-24 NOTE — ED NOTES
Pt.arrives with muñoz; urine is dark yellow and has sediments in in. Pt.has a brace on her right wrist. Pt.is verbal. Pt.wanda has no securement device on it as well.

## 2017-09-25 LAB
ANION GAP SERPL CALC-SCNC: 9 MMOL/L (ref 7–16)
APPEARANCE UR: ABNORMAL
ATRIAL RATE: 68 BPM
ATRIAL RATE: 92 BPM
BACTERIA URNS QL MICRO: ABNORMAL /HPF
BASOPHILS # BLD: 0 K/UL (ref 0–0.2)
BASOPHILS NFR BLD: 0 % (ref 0–2)
BILIRUB UR QL: NEGATIVE
BUN SERPL-MCNC: 30 MG/DL (ref 8–23)
CALCIUM SERPL-MCNC: 8.4 MG/DL (ref 8.3–10.4)
CALCULATED P AXIS, ECG09: 71 DEGREES
CALCULATED P AXIS, ECG09: 72 DEGREES
CALCULATED R AXIS, ECG10: -5 DEGREES
CALCULATED R AXIS, ECG10: 7 DEGREES
CALCULATED T AXIS, ECG11: 45 DEGREES
CALCULATED T AXIS, ECG11: 64 DEGREES
CASTS URNS QL MICRO: ABNORMAL /LPF
CHLORIDE SERPL-SCNC: 109 MMOL/L (ref 98–107)
CO2 SERPL-SCNC: 25 MMOL/L (ref 21–32)
COLOR UR: YELLOW
CREAT SERPL-MCNC: 1.26 MG/DL (ref 0.6–1)
DIAGNOSIS, 93000: NORMAL
DIAGNOSIS, 93000: NORMAL
DIFFERENTIAL METHOD BLD: ABNORMAL
EOSINOPHIL # BLD: 0.1 K/UL (ref 0–0.8)
EOSINOPHIL NFR BLD: 0 % (ref 0.5–7.8)
EPI CELLS #/AREA URNS HPF: ABNORMAL /HPF
ERYTHROCYTE [DISTWIDTH] IN BLOOD BY AUTOMATED COUNT: 14.6 % (ref 11.9–14.6)
GLUCOSE BLD STRIP.AUTO-MCNC: 57 MG/DL (ref 65–100)
GLUCOSE BLD STRIP.AUTO-MCNC: 80 MG/DL (ref 65–100)
GLUCOSE SERPL-MCNC: 116 MG/DL (ref 65–100)
GLUCOSE UR STRIP.AUTO-MCNC: NEGATIVE MG/DL
HCT VFR BLD AUTO: 34.2 % (ref 35.8–46.3)
HGB BLD-MCNC: 10.8 G/DL (ref 11.7–15.4)
HGB UR QL STRIP: ABNORMAL
IMM GRANULOCYTES # BLD: 0.1 K/UL (ref 0–0.5)
IMM GRANULOCYTES NFR BLD: 0.5 % (ref 0–5)
KETONES UR QL STRIP.AUTO: NEGATIVE MG/DL
LACTATE SERPL-SCNC: 1.4 MMOL/L (ref 0.4–2)
LACTATE SERPL-SCNC: 2.2 MMOL/L (ref 0.4–2)
LEUKOCYTE ESTERASE UR QL STRIP.AUTO: ABNORMAL
LYMPHOCYTES # BLD: 1.9 K/UL (ref 0.5–4.6)
LYMPHOCYTES NFR BLD: 8 % (ref 13–44)
MCH RBC QN AUTO: 28 PG (ref 26.1–32.9)
MCHC RBC AUTO-ENTMCNC: 31.6 G/DL (ref 31.4–35)
MCV RBC AUTO: 88.6 FL (ref 79.6–97.8)
MONOCYTES # BLD: 1.3 K/UL (ref 0.1–1.3)
MONOCYTES NFR BLD: 5 % (ref 4–12)
NEUTS SEG # BLD: 20.7 K/UL (ref 1.7–8.2)
NEUTS SEG NFR BLD: 87 % (ref 43–78)
NITRITE UR QL STRIP.AUTO: NEGATIVE
P-R INTERVAL, ECG05: 120 MS
P-R INTERVAL, ECG05: 156 MS
PH UR STRIP: 5.5 [PH] (ref 5–9)
PLATELET # BLD AUTO: 231 K/UL (ref 150–450)
PMV BLD AUTO: 10.8 FL (ref 10.8–14.1)
POTASSIUM SERPL-SCNC: 3.7 MMOL/L (ref 3.5–5.1)
PROT UR STRIP-MCNC: 100 MG/DL
Q-T INTERVAL, ECG07: 340 MS
Q-T INTERVAL, ECG07: 410 MS
QRS DURATION, ECG06: 58 MS
QRS DURATION, ECG06: 76 MS
QTC CALCULATION (BEZET), ECG08: 420 MS
QTC CALCULATION (BEZET), ECG08: 435 MS
RBC # BLD AUTO: 3.86 M/UL (ref 4.05–5.25)
RBC #/AREA URNS HPF: ABNORMAL /HPF
SODIUM SERPL-SCNC: 143 MMOL/L (ref 136–145)
SP GR UR REFRACTOMETRY: 1.02 (ref 1–1.02)
UROBILINOGEN UR QL STRIP.AUTO: 0.2 EU/DL (ref 0.2–1)
VENTRICULAR RATE, ECG03: 68 BPM
VENTRICULAR RATE, ECG03: 92 BPM
WBC # BLD AUTO: 24 K/UL (ref 4.3–11.1)
WBC URNS QL MICRO: >100 /HPF

## 2017-09-25 PROCEDURE — 74011636637 HC RX REV CODE- 636/637: Performed by: HOSPITALIST

## 2017-09-25 PROCEDURE — 74011250636 HC RX REV CODE- 250/636: Performed by: FAMILY MEDICINE

## 2017-09-25 PROCEDURE — 93005 ELECTROCARDIOGRAM TRACING: CPT | Performed by: HOSPITALIST

## 2017-09-25 PROCEDURE — 77030019605

## 2017-09-25 PROCEDURE — 74011000258 HC RX REV CODE- 258: Performed by: FAMILY MEDICINE

## 2017-09-25 PROCEDURE — 74011250637 HC RX REV CODE- 250/637: Performed by: HOSPITALIST

## 2017-09-25 PROCEDURE — 85025 COMPLETE CBC W/AUTO DIFF WBC: CPT | Performed by: HOSPITALIST

## 2017-09-25 PROCEDURE — 80048 BASIC METABOLIC PNL TOTAL CA: CPT | Performed by: HOSPITALIST

## 2017-09-25 PROCEDURE — 74011250636 HC RX REV CODE- 250/636: Performed by: HOSPITALIST

## 2017-09-25 PROCEDURE — 65270000029 HC RM PRIVATE

## 2017-09-25 PROCEDURE — 36415 COLL VENOUS BLD VENIPUNCTURE: CPT | Performed by: HOSPITALIST

## 2017-09-25 PROCEDURE — 90471 IMMUNIZATION ADMIN: CPT

## 2017-09-25 PROCEDURE — 90686 IIV4 VACC NO PRSV 0.5 ML IM: CPT | Performed by: HOSPITALIST

## 2017-09-25 PROCEDURE — 82962 GLUCOSE BLOOD TEST: CPT

## 2017-09-25 PROCEDURE — 83605 ASSAY OF LACTIC ACID: CPT | Performed by: HOSPITALIST

## 2017-09-25 RX ADMIN — Medication 5 ML: at 04:25

## 2017-09-25 RX ADMIN — LISINOPRIL 10 MG: 5 TABLET ORAL at 08:19

## 2017-09-25 RX ADMIN — METOPROLOL SUCCINATE 25 MG: 25 TABLET, EXTENDED RELEASE ORAL at 08:19

## 2017-09-25 RX ADMIN — HEPARIN SODIUM 5000 UNITS: 5000 INJECTION, SOLUTION INTRAVENOUS; SUBCUTANEOUS at 13:21

## 2017-09-25 RX ADMIN — VANCOMYCIN HYDROCHLORIDE 750 MG: 10 INJECTION, POWDER, LYOPHILIZED, FOR SOLUTION INTRAVENOUS at 20:59

## 2017-09-25 RX ADMIN — SODIUM CHLORIDE 125 ML/HR: 900 INJECTION, SOLUTION INTRAVENOUS at 12:20

## 2017-09-25 RX ADMIN — DULOXETINE HYDROCHLORIDE 60 MG: 60 CAPSULE, DELAYED RELEASE ORAL at 08:19

## 2017-09-25 RX ADMIN — CEFEPIME HYDROCHLORIDE 2 G: 2 INJECTION, POWDER, FOR SOLUTION INTRAVENOUS at 16:27

## 2017-09-25 RX ADMIN — CEFEPIME HYDROCHLORIDE 2 G: 2 INJECTION, POWDER, FOR SOLUTION INTRAVENOUS at 04:25

## 2017-09-25 RX ADMIN — ATORVASTATIN CALCIUM 80 MG: 40 TABLET, FILM COATED ORAL at 22:04

## 2017-09-25 RX ADMIN — HEPARIN SODIUM 5000 UNITS: 5000 INJECTION, SOLUTION INTRAVENOUS; SUBCUTANEOUS at 04:25

## 2017-09-25 RX ADMIN — ASPIRIN 81 MG: 81 TABLET, COATED ORAL at 08:20

## 2017-09-25 RX ADMIN — INFLUENZA VIRUS VACCINE 0.5 ML: 15; 15; 15; 15 SUSPENSION INTRAMUSCULAR at 12:20

## 2017-09-25 RX ADMIN — SENNOSIDES AND DOCUSATE SODIUM 1 TABLET: 8.6; 5 TABLET ORAL at 08:18

## 2017-09-25 RX ADMIN — INSULIN GLARGINE 10 UNITS: 100 INJECTION, SOLUTION SUBCUTANEOUS at 08:20

## 2017-09-25 RX ADMIN — VANCOMYCIN HYDROCHLORIDE 750 MG: 10 INJECTION, POWDER, LYOPHILIZED, FOR SOLUTION INTRAVENOUS at 08:18

## 2017-09-25 RX ADMIN — HEPARIN SODIUM 5000 UNITS: 5000 INJECTION, SOLUTION INTRAVENOUS; SUBCUTANEOUS at 22:04

## 2017-09-25 RX ADMIN — SODIUM CHLORIDE 125 ML/HR: 900 INJECTION, SOLUTION INTRAVENOUS at 04:26

## 2017-09-25 RX ADMIN — SODIUM CHLORIDE 125 ML/HR: 900 INJECTION, SOLUTION INTRAVENOUS at 22:12

## 2017-09-25 NOTE — PROGRESS NOTES
Asleep but easily arousable,,semi fowlers position,,no complaints of keshav or nausea at this time,,responsed verbally well to verbal command,,assessment completed,,muñoz catheter intact ,,noted some dark jamison color urine with sediments,,IV line patent on left mustafa with fluids infusing well,,patient took all p.o.medications  With thickener and no swallowing problem noted,,also took in apple sauce (4 oz.),,no distress noted. Kyle Spies Kyle Spies Kyle Spies Kyle Spies

## 2017-09-25 NOTE — PROGRESS NOTES
Hospitalist Progress Note    2017  Admit Date: 2017  3:34 PM   NAME: Gavino Ann   :  1935   DOS:              17  MRN:  [id number]   Attending: Ginette Tapia MD  PCP:  Christian Casillas MD  Treatment Team: Attending Provider: Ginette Tapia MD    Full Code     SUBJECTIVE:   As previously documented:       17    Gavino Ann  Seen and examined in bed this am she is more verbal and was able to carry a conversation  Her wbc  Count has decrease and she is having appropriate urine outputs. 10+ ROS reviewed and negative except for positive in HPI.    Allergies   Allergen Reactions    Pcn [Penicillins] Angioedema     Pt tolerated rocephin 2015     Current Facility-Administered Medications   Medication Dose Route Frequency    aspirin delayed-release tablet 81 mg  81 mg Oral DAILY    atorvastatin (LIPITOR) tablet 80 mg  80 mg Per G Tube QHS    DULoxetine (CYMBALTA) capsule 60 mg  60 mg Oral DAILY    insulin glargine (LANTUS) injection 10 Units  10 Units SubCUTAneous DAILY    lisinopril (PRINIVIL, ZESTRIL) tablet 10 mg  10 mg Oral DAILY    metoprolol succinate (TOPROL-XL) XL tablet 25 mg  25 mg Oral DAILY    0.9% sodium chloride infusion  125 mL/hr IntraVENous CONTINUOUS    sodium chloride (NS) flush 5-10 mL  5-10 mL IntraVENous Q8H    sodium chloride (NS) flush 5-10 mL  5-10 mL IntraVENous PRN    acetaminophen (TYLENOL) solution 650 mg  650 mg Oral Q4H PRN    HYDROcodone-acetaminophen (NORCO) 5-325 mg per tablet 1 Tab  1 Tab Oral Q4H PRN    HYDROmorphone (PF) (DILAUDID) injection 0.2 mg  0.2 mg IntraVENous Q6H PRN    naloxone (NARCAN) injection 0.4 mg  0.4 mg IntraVENous PRN    diphenhydrAMINE (BENADRYL) injection 12.5 mg  12.5 mg IntraVENous Q4H PRN    ondansetron (ZOFRAN) injection 4 mg  4 mg IntraVENous Q4H PRN    senna-docusate (PERICOLACE) 8.6-50 mg per tablet 1 Tab  1 Tab Oral DAILY    heparin (porcine) injection 5,000 Units  5,000 Units SubCUTAneous Q8H  vancomycin (VANCOCIN) 750 mg in  ml infusion  750 mg IntraVENous Q12H    tuberculin injection 5 Units  5 Units IntraDERMal ONCE    insulin lispro (HUMALOG) injection   SubCUTAneous TIDAC    cefepime (MAXIPIME) 2 g in 0.9% sodium chloride (MBP/ADV) 100 mL  2 g IntraVENous Q12H         Immunization History   Administered Date(s) Administered    TB Skin Test (PPD) Intradermal 2015, 2016, 2017, 2017     Objective:   Patient Vitals for the past 24 hrs:   Temp Pulse Resp BP SpO2   17 0705 97.9 °F (36.6 °C) 75 18 110/69 98 %   17 0400 97.9 °F (36.6 °C) 78 18 108/69 98 %   17 0000 99.6 °F (37.6 °C) 87 18 129/78 99 %   17 1955 95.9 °F (35.5 °C) 86 18 123/84 96 %   17 1855 98 °F (36.7 °C) 92 16 139/77 98 %   17 1528 98.3 °F (36.8 °C) 91 - (!) 208/138 95 %     Temp (24hrs), Av.9 °F (36.6 °C), Min:95.9 °F (35.5 °C), Max:99.6 °F (37.6 °C)    Oxygen Therapy  O2 Sat (%): 98 % (17)  O2 Device: Room air (17)  Oxygen Therapy  O2 Sat (%): 98 % (17)  O2 Device: Room air (17)    Physical Exam:  General:         Alert, cooperative, no distress   HEENT:               NCAT. No obvious deformity. Nares normal. No drainage  Lungs:  CTABL. No wheezing/rhonchi/rales  Cardiovascular:   RRR. No m/r/g. No pedal edema b/l. +2 PT/DT pulses b/l. Abdomen:       S/nt/nd. Bowel sounds normal. .   Skin:         No rashes or lesions. Not Jaundiced  Neurologic:    AAOx3. CN II- XII grossly WNL. No gross focal deficit. Moves all extremities. Normal sensory. Normal gait. Psychiatric:         Good mood. Normal affect. Denies any SI/HI.                DIAGNOSTIC STUDIES      Data Review:   Recent Results (from the past 24 hour(s))   POC TROPONIN-I    Collection Time: 17  5:08 PM   Result Value Ref Range    Troponin-I (POC) 0 (L) 0.02 - 0.05 ng/ml   POC LACTIC ACID    Collection Time: 17  5:12 PM   Result Value Ref Range Lactic Acid (POC) 2.9 (H) 0.5 - 1.9 mmol/L   CBC W/O DIFF    Collection Time: 09/24/17  5:14 PM   Result Value Ref Range    WBC 28.6 (H) 4.3 - 11.1 K/uL    RBC 4.81 4.05 - 5.25 M/uL    HGB 13.9 11.7 - 15.4 g/dL    HCT 42.6 35.8 - 46.3 %    MCV 88.6 79.6 - 97.8 FL    MCH 28.9 26.1 - 32.9 PG    MCHC 32.6 31.4 - 35.0 g/dL    RDW 14.3 11.9 - 14.6 %    PLATELET 298 718 - 406 K/uL    MPV 11.4 10.8 - 06.3 FL   METABOLIC PANEL, COMPREHENSIVE    Collection Time: 09/24/17  5:14 PM   Result Value Ref Range    Sodium 140 136 - 145 mmol/L    Potassium 4.2 3.5 - 5.1 mmol/L    Chloride 103 98 - 107 mmol/L    CO2 26 21 - 32 mmol/L    Anion gap 11 7 - 16 mmol/L    Glucose 277 (H) 65 - 100 mg/dL    BUN 30 (H) 8 - 23 MG/DL    Creatinine 1.66 (H) 0.6 - 1.0 MG/DL    GFR est AA 38 (L) >60 ml/min/1.73m2    GFR est non-AA 32 (L) >60 ml/min/1.73m2    Calcium 9.5 8.3 - 10.4 MG/DL    Bilirubin, total 0.9 0.2 - 1.1 MG/DL    ALT (SGPT) 19 12 - 65 U/L    AST (SGOT) 17 15 - 37 U/L    Alk.  phosphatase 113 50 - 136 U/L    Protein, total 7.5 6.3 - 8.2 g/dL    Albumin 2.8 (L) 3.2 - 4.6 g/dL    Globulin 4.7 (H) 2.3 - 3.5 g/dL    A-G Ratio 0.6 (L) 1.2 - 3.5     LIPASE    Collection Time: 09/24/17  5:14 PM   Result Value Ref Range    Lipase 61 (L) 73 - 393 U/L   PROCALCITONIN    Collection Time: 09/24/17  5:14 PM   Result Value Ref Range    Procalcitonin 0.2 ng/mL   LACTIC ACID    Collection Time: 09/24/17  5:30 PM   Result Value Ref Range    Lactic acid 3.0 (HH) 0.4 - 2.0 MMOL/L   URINALYSIS W/ RFLX MICROSCOPIC    Collection Time: 09/24/17 11:05 PM   Result Value Ref Range    Color YELLOW      Appearance TURBID      Specific gravity 1.020 1.001 - 1.023      pH (UA) 5.5 5.0 - 9.0      Protein 100 (A) NEG mg/dL    Glucose NEGATIVE  mg/dL    Ketone NEGATIVE  NEG mg/dL    Bilirubin NEGATIVE  NEG      Blood LARGE (A) NEG      Urobilinogen 0.2 0.2 - 1.0 EU/dL    Nitrites NEGATIVE  NEG      Leukocyte Esterase LARGE (A) NEG      WBC >100 0 /hpf    RBC 10-20 0 /hpf    Epithelial cells 0-3 0 /hpf    Bacteria TRACE 0 /hpf    Casts 0-3 0 /lpf   CULTURE, URINE    Collection Time: 09/24/17 11:05 PM   Result Value Ref Range    Special Requests: NO SPECIAL REQUESTS      Culture result:        NO GROWTH AFTER SHORT PERIOD OF INCUBATION. FURTHER RESULTS TO FOLLOW AFTER OVERNIGHT INCUBATION. LACTIC ACID    Collection Time: 09/24/17 11:40 PM   Result Value Ref Range    Lactic acid 2.2 (HH) 0.4 - 2.0 MMOL/L   METABOLIC PANEL, BASIC    Collection Time: 09/25/17  5:05 AM   Result Value Ref Range    Sodium 143 136 - 145 mmol/L    Potassium 3.7 3.5 - 5.1 mmol/L    Chloride 109 (H) 98 - 107 mmol/L    CO2 25 21 - 32 mmol/L    Anion gap 9 7 - 16 mmol/L    Glucose 116 (H) 65 - 100 mg/dL    BUN 30 (H) 8 - 23 MG/DL    Creatinine 1.26 (H) 0.6 - 1.0 MG/DL    GFR est AA 53 (L) >60 ml/min/1.73m2    GFR est non-AA 43 (L) >60 ml/min/1.73m2    Calcium 8.4 8.3 - 10.4 MG/DL   CBC WITH AUTOMATED DIFF    Collection Time: 09/25/17  5:05 AM   Result Value Ref Range    WBC 24.0 (H) 4.3 - 11.1 K/uL    RBC 3.86 (L) 4.05 - 5.25 M/uL    HGB 10.8 (L) 11.7 - 15.4 g/dL    HCT 34.2 (L) 35.8 - 46.3 %    MCV 88.6 79.6 - 97.8 FL    MCH 28.0 26.1 - 32.9 PG    MCHC 31.6 31.4 - 35.0 g/dL    RDW 14.6 11.9 - 14.6 %    PLATELET 048 111 - 289 K/uL    MPV 10.8 10.8 - 14.1 FL    DF AUTOMATED      NEUTROPHILS 87 (H) 43 - 78 %    LYMPHOCYTES 8 (L) 13 - 44 %    MONOCYTES 5 4.0 - 12.0 %    EOSINOPHILS 0 (L) 0.5 - 7.8 %    BASOPHILS 0 0.0 - 2.0 %    IMMATURE GRANULOCYTES 0.5 0.0 - 5.0 %    ABS. NEUTROPHILS 20.7 (H) 1.7 - 8.2 K/UL    ABS. LYMPHOCYTES 1.9 0.5 - 4.6 K/UL    ABS. MONOCYTES 1.3 0.1 - 1.3 K/UL    ABS. EOSINOPHILS 0.1 0.0 - 0.8 K/UL    ABS. BASOPHILS 0.0 0.0 - 0.2 K/UL    ABS. IMM.  GRANS. 0.1 0.0 - 0.5 K/UL   LACTIC ACID    Collection Time: 09/25/17  5:05 AM   Result Value Ref Range    Lactic acid 1.4 0.4 - 2.0 MMOL/L       All Micro Results     Procedure Component Value Units Date/Time    CULTURE, URINE [[id number]] Collected:  09/24/17 2305    Order Status:  Completed Specimen:  Urine from Jeong Specimen Updated:  09/25/17 0655     Special Requests: NO SPECIAL REQUESTS        Culture result:         NO GROWTH AFTER SHORT PERIOD OF INCUBATION. FURTHER RESULTS TO FOLLOW AFTER OVERNIGHT INCUBATION. CULTURE, BLOOD [[id number]] Collected:  09/24/17 1826    Order Status:  Completed Specimen:  Blood from Blood Updated:  09/24/17 1902    CULTURE, BLOOD [[id number]] Collected:  09/24/17 1833    Order Status:  Completed Specimen:  Blood from Blood Updated:  09/24/17 1902    CULTURE, URINE [[id number]]     Order Status:  Canceled Specimen:  Urine from Jeong Specimen           Imaging /Procedures /Studies:    CXR Results  (Last 48 hours)               09/24/17 1818  XR ABD ACUTE W 1 V CHEST Final result    Impression:  IMPRESSION: Possible left renal calculi, degenerative thoracic scoliosis, and   nonspecific bowel gas pattern, cardiomegaly, clear lung fields       Narrative:  Acute abdomen series: 9/24/2017       INDICATION: Abdominal distention       COMPARISON: 8/20/2017       Degenerative thoracolumbar scoliosis is noted. Heart is enlarged and the lung   fields clear. Soft tissues are intact. Remote fracture deformity of the proximal   right humerus is noted. There is no free air. Bowel gas pattern is is   unremarkable. There are no abnormal masses. . Degenerative narrowing of the   bilateral hips and right femoral Nelson pin is noted. Soft tissues are   unremarkable. There are some subtle calculi overlying the left renal silhouette   of which the largest measures 12.6 mm.               CT Results  (Last 48 hours)               09/24/17 1654  CT HEAD WO CONT Final result    Impression:  Impression: Supratentorial microischemia, remote left basal ganglia CVA. Note: If a subtle CVA is suspected, MRI would be more definitive if clinically   warranted.        Narrative:  CT scan of the brain 9/24/2017   Scanning was performed within 24 hours of arrival to the facility   Comparison: 8/20/2017   The MAA was adjusted using dose modulation to reduce patient radiation exposure. Indication: Unresponsive   Findings: The brain parenchyma and ventricular structures are remarkable for   subcortical and periventricular white matter lucency. There is a stable CSF   density within the left basal ganglia  white matter. . There is normal white-grey   matter differentiation. There are no interval mass lesions, hemorrhage, or   evidence of an acute stroke. The calvarium and the sinuses are unremarkable. Xr Abd Acute W 1 V Chest    Result Date: 9/24/2017  IMPRESSION: Possible left renal calculi, degenerative thoracic scoliosis, and nonspecific bowel gas pattern, cardiomegaly, clear lung fields    Ct Head Wo Cont    Result Date: 9/24/2017  Impression: Supratentorial microischemia, remote left basal ganglia CVA. Note: If a subtle CVA is suspected, MRI would be more definitive if clinically warranted. No results found for this visit on 09/24/17. Labs and Studies from previous 24 hours have been personally reviewed by myself [place] Problems    Diagnosis Date Noted    UTI (urinary tract infection) 09/24/2017    Acute encephalopathy 11/05/2015    Sepsis ([facility] [address] 11/05/2015     Hospital Problems as of 9/25/2017  Date Reviewed: 7/14/2016          Codes Class Noted - Resolved POA    UTI (urinary tract infection) ICD-10-CM: N39.0  ICD-9-CM: 599.0  9/24/2017 - Present Unknown        * (Principal)Sepsis ([facility] [address] ICD-10-CM: A41.9  ICD-9-CM: 038.9, 995.91  11/5/2015 - Present Yes        Acute encephalopathy ICD-10-CM: G93.40  ICD-9-CM: 348.30  11/5/2015 - Present Unknown              Plan:83 yo fml presenting with acute change in mental status  due to  Sepsis urine  Of origin      Urinary  Tract  Infection  Sepsis   Cont with  aztreonam  For coverage prior culture was sensitive  Will de escalate following urine Culture.   Cont Brief dose of Vancomycin      Acute change in mental status   Due to infection  This has improved  will cont  With current management   Cont  Antibiotics     Acute renal  Failure   Due to dehydration  Cont  Aggressive  IV  fluids  Improving at this time     Urinary overactive bladder  Jeong catheter changed and placed appropriately and draining     HTN  Resume home meds since bp has improves     DM  Sliding scale    Hold oral  Agents at this time   Check glucose qid      Depression  Resume cymbalta       Called about + Blood culture GR+ specimen cont vancomycin     DVT Prophylaxis:  Heparin   CODE Status:   Plan of Care Discussed with: patient.  Care team.  Medical Risk:  Disposition:    Gino Gupta MD  09/25/17

## 2017-09-25 NOTE — PROGRESS NOTES
Appears to be sleeping,semi fowlers position,,no distress noted. .. Shelby Mellow Shelby Mellow Shelby Mellow

## 2017-09-25 NOTE — PROGRESS NOTES
Patient alert with confusion. Unable to answer questions. Active bowel sounds. Neurovascular and peripheral vascular checks WNL. Jeong draining clear yellow urine with sediment to bag. No c/o pain at this time. Bed low and locked. Call light within reach. Instructed to call for assistance. Patient verbalizes understanding. Will monitor.

## 2017-09-25 NOTE — PROGRESS NOTES
Asleep but easily arousable,semi fowlers position,,patient refused her breakfast this morning,,she only took the thickened orange juice,,no problems with swallowing. .. Kyle Spies Kyle Spies Kyle Spies

## 2017-09-26 LAB
ANION GAP SERPL CALC-SCNC: 6 MMOL/L (ref 7–16)
BASOPHILS # BLD: 0 K/UL (ref 0–0.2)
BASOPHILS NFR BLD: 0 % (ref 0–2)
BUN SERPL-MCNC: 21 MG/DL (ref 8–23)
CALCIUM SERPL-MCNC: 8.4 MG/DL (ref 8.3–10.4)
CHLORIDE SERPL-SCNC: 110 MMOL/L (ref 98–107)
CO2 SERPL-SCNC: 28 MMOL/L (ref 21–32)
CREAT SERPL-MCNC: 0.78 MG/DL (ref 0.6–1)
DIFFERENTIAL METHOD BLD: ABNORMAL
EOSINOPHIL # BLD: 0.4 K/UL (ref 0–0.8)
EOSINOPHIL NFR BLD: 2 % (ref 0.5–7.8)
ERYTHROCYTE [DISTWIDTH] IN BLOOD BY AUTOMATED COUNT: 14.2 % (ref 11.9–14.6)
GLUCOSE BLD STRIP.AUTO-MCNC: 102 MG/DL (ref 65–100)
GLUCOSE BLD STRIP.AUTO-MCNC: 111 MG/DL (ref 65–100)
GLUCOSE BLD STRIP.AUTO-MCNC: 76 MG/DL (ref 65–100)
GLUCOSE BLD STRIP.AUTO-MCNC: 91 MG/DL (ref 65–100)
GLUCOSE SERPL-MCNC: 72 MG/DL (ref 65–100)
HCT VFR BLD AUTO: 33.5 % (ref 35.8–46.3)
HGB BLD-MCNC: 10.5 G/DL (ref 11.7–15.4)
IMM GRANULOCYTES # BLD: 0.1 K/UL (ref 0–0.5)
IMM GRANULOCYTES NFR BLD: 0.4 % (ref 0–5)
LYMPHOCYTES # BLD: 2.1 K/UL (ref 0.5–4.6)
LYMPHOCYTES NFR BLD: 13 % (ref 13–44)
MCH RBC QN AUTO: 27.9 PG (ref 26.1–32.9)
MCHC RBC AUTO-ENTMCNC: 31.3 G/DL (ref 31.4–35)
MCV RBC AUTO: 89.1 FL (ref 79.6–97.8)
MM INDURATION POC: 0 MM (ref 0–5)
MM INDURATION POC: 0 MM (ref 0–5)
MONOCYTES # BLD: 0.7 K/UL (ref 0.1–1.3)
MONOCYTES NFR BLD: 5 % (ref 4–12)
NEUTS SEG # BLD: 13 K/UL (ref 1.7–8.2)
NEUTS SEG NFR BLD: 80 % (ref 43–78)
PLATELET # BLD AUTO: 207 K/UL (ref 150–450)
PMV BLD AUTO: 10.8 FL (ref 10.8–14.1)
POTASSIUM SERPL-SCNC: 3.2 MMOL/L (ref 3.5–5.1)
PPD POC: NORMAL NEGATIVE
PPD POC: NORMAL NEGATIVE
RBC # BLD AUTO: 3.76 M/UL (ref 4.05–5.25)
SODIUM SERPL-SCNC: 144 MMOL/L (ref 136–145)
WBC # BLD AUTO: 16.3 K/UL (ref 4.3–11.1)

## 2017-09-26 PROCEDURE — 87040 BLOOD CULTURE FOR BACTERIA: CPT | Performed by: HOSPITALIST

## 2017-09-26 PROCEDURE — 74011250637 HC RX REV CODE- 250/637: Performed by: HOSPITALIST

## 2017-09-26 PROCEDURE — 65270000029 HC RM PRIVATE

## 2017-09-26 PROCEDURE — 77030019605

## 2017-09-26 PROCEDURE — 74011250636 HC RX REV CODE- 250/636: Performed by: HOSPITALIST

## 2017-09-26 PROCEDURE — 74011636637 HC RX REV CODE- 636/637: Performed by: HOSPITALIST

## 2017-09-26 PROCEDURE — 74011250636 HC RX REV CODE- 250/636: Performed by: FAMILY MEDICINE

## 2017-09-26 PROCEDURE — 85025 COMPLETE CBC W/AUTO DIFF WBC: CPT | Performed by: HOSPITALIST

## 2017-09-26 PROCEDURE — 82962 GLUCOSE BLOOD TEST: CPT

## 2017-09-26 PROCEDURE — 80048 BASIC METABOLIC PNL TOTAL CA: CPT | Performed by: HOSPITALIST

## 2017-09-26 PROCEDURE — 74011000258 HC RX REV CODE- 258: Performed by: FAMILY MEDICINE

## 2017-09-26 RX ORDER — AMLODIPINE BESYLATE 5 MG/1
5 TABLET ORAL ONCE
Status: COMPLETED | OUTPATIENT
Start: 2017-09-26 | End: 2017-09-26

## 2017-09-26 RX ORDER — HYDRALAZINE HYDROCHLORIDE 20 MG/ML
10 INJECTION INTRAMUSCULAR; INTRAVENOUS
Status: DISCONTINUED | OUTPATIENT
Start: 2017-09-26 | End: 2017-09-27

## 2017-09-26 RX ORDER — POTASSIUM CHLORIDE 14.9 MG/ML
20 INJECTION INTRAVENOUS
Status: COMPLETED | OUTPATIENT
Start: 2017-09-26 | End: 2017-09-26

## 2017-09-26 RX ORDER — POTASSIUM CHLORIDE 14.9 MG/ML
20 INJECTION INTRAVENOUS
Status: DISCONTINUED | OUTPATIENT
Start: 2017-09-26 | End: 2017-09-26 | Stop reason: SDUPTHER

## 2017-09-26 RX ORDER — POTASSIUM CHLORIDE 20 MEQ/1
40 TABLET, EXTENDED RELEASE ORAL
Status: DISCONTINUED | OUTPATIENT
Start: 2017-09-26 | End: 2017-09-26

## 2017-09-26 RX ADMIN — SODIUM CHLORIDE 125 ML/HR: 900 INJECTION, SOLUTION INTRAVENOUS at 09:07

## 2017-09-26 RX ADMIN — CEFEPIME HYDROCHLORIDE 2 G: 2 INJECTION, POWDER, FOR SOLUTION INTRAVENOUS at 17:31

## 2017-09-26 RX ADMIN — VANCOMYCIN HYDROCHLORIDE 750 MG: 10 INJECTION, POWDER, LYOPHILIZED, FOR SOLUTION INTRAVENOUS at 09:09

## 2017-09-26 RX ADMIN — INSULIN GLARGINE 10 UNITS: 100 INJECTION, SOLUTION SUBCUTANEOUS at 09:06

## 2017-09-26 RX ADMIN — DULOXETINE HYDROCHLORIDE 60 MG: 60 CAPSULE, DELAYED RELEASE ORAL at 09:06

## 2017-09-26 RX ADMIN — HEPARIN SODIUM 5000 UNITS: 5000 INJECTION, SOLUTION INTRAVENOUS; SUBCUTANEOUS at 21:55

## 2017-09-26 RX ADMIN — ASPIRIN 81 MG: 81 TABLET, COATED ORAL at 09:06

## 2017-09-26 RX ADMIN — ATORVASTATIN CALCIUM 80 MG: 40 TABLET, FILM COATED ORAL at 21:55

## 2017-09-26 RX ADMIN — AMLODIPINE BESYLATE 5 MG: 5 TABLET ORAL at 12:26

## 2017-09-26 RX ADMIN — SENNOSIDES AND DOCUSATE SODIUM 1 TABLET: 8.6; 5 TABLET ORAL at 09:06

## 2017-09-26 RX ADMIN — POTASSIUM CHLORIDE 20 MEQ: 200 INJECTION, SOLUTION INTRAVENOUS at 12:26

## 2017-09-26 RX ADMIN — HEPARIN SODIUM 5000 UNITS: 5000 INJECTION, SOLUTION INTRAVENOUS; SUBCUTANEOUS at 12:25

## 2017-09-26 RX ADMIN — METOPROLOL SUCCINATE 25 MG: 25 TABLET, EXTENDED RELEASE ORAL at 09:06

## 2017-09-26 RX ADMIN — CEFEPIME HYDROCHLORIDE 2 G: 2 INJECTION, POWDER, FOR SOLUTION INTRAVENOUS at 05:40

## 2017-09-26 RX ADMIN — POTASSIUM CHLORIDE 20 MEQ: 200 INJECTION, SOLUTION INTRAVENOUS at 10:30

## 2017-09-26 RX ADMIN — SODIUM CHLORIDE 125 ML/HR: 900 INJECTION, SOLUTION INTRAVENOUS at 17:31

## 2017-09-26 RX ADMIN — VANCOMYCIN HYDROCHLORIDE 750 MG: 10 INJECTION, POWDER, LYOPHILIZED, FOR SOLUTION INTRAVENOUS at 21:55

## 2017-09-26 RX ADMIN — LISINOPRIL 10 MG: 5 TABLET ORAL at 09:06

## 2017-09-26 RX ADMIN — HEPARIN SODIUM 5000 UNITS: 5000 INJECTION, SOLUTION INTRAVENOUS; SUBCUTANEOUS at 05:40

## 2017-09-26 NOTE — CDMP QUERY
Please clarify if this patient is being treated/managed for:    **Bacteremia in the setting of sepsis, UTI withBC X 2 with GPC being treated with IV Vancomycin. =>Other Explanation of clinical findings  =>Unable to Determine (no explanation of clinical findings)    The medical record reflects the following:    Risk Factors: sepsis, UTI    Clinical Indicators: BC X 2 with GPC    Treatment: IV Vancomycin    Please clarify and document your clinical opinion in the progress notes and discharge summary including the definitive and/or presumptive diagnosis, (suspected or probable), related to the above clinical findings. Please include clinical findings supporting your diagnosis.     Thanks,  Trudee Schilder, RN, CDS  Compliant Documentation Management Program  [phone]

## 2017-09-26 NOTE — PROGRESS NOTES
Shift assessment complete, see flow sheet for full assessment. Pt alert and oriented to person, denies pain at this time. Pt's words are incomprehensible and slurred due to past CVAs. Respirations even and unlabored breath sounds clear bilaterally. S1 S2 auscultated, VSS. Skin warm, dry allyvn in place. Abdomen soft and non tender, bowel sounds active in all quadrants. Pt on honey thickened liquids. Bed low and locked, call light within reach, pt advised to call if assistance is needed.

## 2017-09-26 NOTE — PROGRESS NOTES
Hospitalist Progress Note    2017  Admit Date: 2017  3:34 PM   NAME: Rachna Sorensen   :  1935   DOS:              17  MRN:  [id number]   Attending: Janell Santos MD  PCP:  James Wade MD  Treatment Team: Attending Provider: Janell Santos MD    Full Code     SUBJECTIVE:   As previously documented:       17    Rachna Sorensen  Seen and examined in bed this am she is more verbal  States that she did not feel like eating today. 10+ ROS reviewed and negative except for positive in HPI.    Allergies   Allergen Reactions    Pcn [Penicillins] Angioedema     Pt tolerated rocephin 2015     Current Facility-Administered Medications   Medication Dose Route Frequency    potassium chloride 20 mEq in 100 ml IVPB  20 mEq IntraVENous Q2H    vancomycin (VANCOCIN) 750 mg in  ml infusion  750 mg IntraVENous Q12H    aspirin delayed-release tablet 81 mg  81 mg Oral DAILY    atorvastatin (LIPITOR) tablet 80 mg  80 mg Per G Tube QHS    DULoxetine (CYMBALTA) capsule 60 mg  60 mg Oral DAILY    insulin glargine (LANTUS) injection 10 Units  10 Units SubCUTAneous DAILY    lisinopril (PRINIVIL, ZESTRIL) tablet 10 mg  10 mg Oral DAILY    metoprolol succinate (TOPROL-XL) XL tablet 25 mg  25 mg Oral DAILY    0.9% sodium chloride infusion  125 mL/hr IntraVENous CONTINUOUS    sodium chloride (NS) flush 5-10 mL  5-10 mL IntraVENous Q8H    sodium chloride (NS) flush 5-10 mL  5-10 mL IntraVENous PRN    acetaminophen (TYLENOL) solution 650 mg  650 mg Oral Q4H PRN    HYDROcodone-acetaminophen (NORCO) 5-325 mg per tablet 1 Tab  1 Tab Oral Q4H PRN    HYDROmorphone (PF) (DILAUDID) injection 0.2 mg  0.2 mg IntraVENous Q6H PRN    naloxone (NARCAN) injection 0.4 mg  0.4 mg IntraVENous PRN    diphenhydrAMINE (BENADRYL) injection 12.5 mg  12.5 mg IntraVENous Q4H PRN    ondansetron (ZOFRAN) injection 4 mg  4 mg IntraVENous Q4H PRN    senna-docusate (PERICOLACE) 8.6-50 mg per tablet 1 Tab  1 Tab Oral DAILY    heparin (porcine) injection 5,000 Units  5,000 Units SubCUTAneous Q8H    insulin lispro (HUMALOG) injection   SubCUTAneous TIDAC    cefepime (MAXIPIME) 2 g in 0.9% sodium chloride (MBP/ADV) 100 mL  2 g IntraVENous Q12H         Immunization History   Administered Date(s) Administered    Influenza Vaccine (Quad) PF 2017    TB Skin Test (PPD) Intradermal 2015, 2016, 2017, 2017     Objective:     Patient Vitals for the past 24 hrs:   Temp Pulse Resp BP SpO2   17 0725 98.3 °F (36.8 °C) 64 16 (!) 167/93 98 %   17 0415 98.1 °F (36.7 °C) 67 16 150/86 97 %   17 - - - 143/88 -   17 1930 97.9 °F (36.6 °C) 63 16 196/88 100 %   17 1535 97.6 °F (36.4 °C) 68 16 127/70 100 %   17 1130 96.7 °F (35.9 °C) 64 18 137/77 98 %     Temp (24hrs), Av.7 °F (36.5 °C), Min:96.7 °F (35.9 °C), Max:98.3 °F (36.8 °C)    Oxygen Therapy  O2 Sat (%): 98 % (17 0725)  O2 Device: Room air (17 07)  Oxygen Therapy  O2 Sat (%): 98 % (17 0725)  O2 Device: Room air (17 07)    Physical Exam:  General:         Alert, cooperative, no distress   HEENT:               NCAT. No obvious deformity. Nares normal. No drainage  Lungs:  CTABL. No wheezing/rhonchi/rales  Cardiovascular:   RRR. No m/r/g. No pedal edema b/l. +2 PT/DT pulses b/l. Abdomen:       S/nt/nd. Bowel sounds normal. .   Skin:         No rashes or lesions. Not Jaundiced  Neurologic:    AAOx3. CN II- XII grossly WNL. No gross focal deficit. Moves all extremities. Normal sensory. Normal gait. Psychiatric:         Good mood. Normal affect. Denies any SI/HI.                DIAGNOSTIC STUDIES      Data Review:   Recent Results (from the past 24 hour(s))   EKG, 12 LEAD, INITIAL    Collection Time: 17 10:55 AM   Result Value Ref Range    Ventricular Rate 68 BPM    Atrial Rate 68 BPM    P-R Interval 156 ms    QRS Duration 76 ms    Q-T Interval 410 ms    QTC Calculation (Bezet) 435 ms    Calculated P Axis 72 degrees    Calculated R Axis 7 degrees    Calculated T Axis 45 degrees    Diagnosis       Normal sinus rhythm  Normal ECG  When compared with ECG of 24-SEP-2017 15:39,  Criteria for Septal infarct are no longer Present  Confirmed by [facility]rigez Record ([id number]) on 9/25/2017 2:08:37 PM     GLUCOSE, POC    Collection Time: 09/25/17  4:25 PM   Result Value Ref Range    Glucose (POC) 57 (L) 65 - 100 mg/dL   PLEASE READ & DOCUMENT PPD TEST IN 24 HRS    Collection Time: 09/25/17  8:27 PM   Result Value Ref Range    PPD neg Negative    mm Induration 0 mm   GLUCOSE, POC    Collection Time: 09/25/17 10:16 PM   Result Value Ref Range    Glucose (POC) 80 65 - 100 mg/dL   CBC WITH AUTOMATED DIFF    Collection Time: 09/26/17  4:55 AM   Result Value Ref Range    WBC 16.3 (H) 4.3 - 11.1 K/uL    RBC 3.76 (L) 4.05 - 5.25 M/uL    HGB 10.5 (L) 11.7 - 15.4 g/dL    HCT 33.5 (L) 35.8 - 46.3 %    MCV 89.1 79.6 - 97.8 FL    MCH 27.9 26.1 - 32.9 PG    MCHC 31.3 (L) 31.4 - 35.0 g/dL    RDW 14.2 11.9 - 14.6 %    PLATELET 235 887 - 171 K/uL    MPV 10.8 10.8 - 14.1 FL    DF AUTOMATED      NEUTROPHILS 80 (H) 43 - 78 %    LYMPHOCYTES 13 13 - 44 %    MONOCYTES 5 4.0 - 12.0 %    EOSINOPHILS 2 0.5 - 7.8 %    BASOPHILS 0 0.0 - 2.0 %    IMMATURE GRANULOCYTES 0.4 0.0 - 5.0 %    ABS. NEUTROPHILS 13.0 (H) 1.7 - 8.2 K/UL    ABS. LYMPHOCYTES 2.1 0.5 - 4.6 K/UL    ABS. MONOCYTES 0.7 0.1 - 1.3 K/UL    ABS. EOSINOPHILS 0.4 0.0 - 0.8 K/UL    ABS. BASOPHILS 0.0 0.0 - 0.2 K/UL    ABS. IMM.  GRANS. 0.1 0.0 - 0.5 K/UL   METABOLIC PANEL, BASIC    Collection Time: 09/26/17  4:55 AM   Result Value Ref Range    Sodium 144 136 - 145 mmol/L    Potassium 3.2 (L) 3.5 - 5.1 mmol/L    Chloride 110 (H) 98 - 107 mmol/L    CO2 28 21 - 32 mmol/L    Anion gap 6 (L) 7 - 16 mmol/L    Glucose 72 65 - 100 mg/dL    BUN 21 8 - 23 MG/DL    Creatinine 0.78 0.6 - 1.0 MG/DL    GFR est AA >60 >60 ml/min/1.73m2    GFR est non-AA >60 >60 ml/min/1.73m2    Calcium 8.4 8.3 - 10.4 MG/DL   GLUCOSE, POC    Collection Time: 09/26/17  6:34 AM   Result Value Ref Range    Glucose (POC) 76 65 - 100 mg/dL   PLEASE READ & DOCUMENT PPD TEST IN 48 HRS    Collection Time: 09/26/17 10:18 AM   Result Value Ref Range    PPD neg Negative    mm Induration 0 mm       All Micro Results     Procedure Component Value Units Date/Time    CULTURE, URINE [[id number]]  (Abnormal) Collected:  09/24/17 2305    Order Status:  Completed Specimen:  Urine from Jeong Specimen Updated:  09/26/17 8545     Special Requests: NO SPECIAL REQUESTS        Culture result:         11900 COLONIES/mL GRAM NEGATIVE RODS SUBCULTURE IN PROGRESS (A)    CULTURE, BLOOD [[id number]] Collected:  09/26/17 0455    Order Status:  Completed Specimen:  Blood from Blood Updated:  09/26/17 0500    CULTURE, BLOOD [[id number]] Collected:  09/24/17 1833    Order Status:  Completed Specimen:  Blood from Blood Updated:  09/25/17 2259     Special Requests: --        LEFT  HAND       GRAM STAIN GRAM POSITIVE COCCI                 AEROBIC AND ANAEROBIC BOTTLES              RESULTS VERIFIED, PHONED TO AND READ BACK BY [id number] Yury Walker RN AT [id number] ON [id number] ZF     Culture result:         CULTURE IN [address] UPDATES TO FOLLOW              SA target DNA sequence not detected within the sample. Test performed by PCR    CULTURE, BLOOD [[id number]] Collected:  09/24/17 1826    Order Status:  Completed Specimen:  Blood from Blood Updated:  09/25/17 2158     Special Requests: --        LEFT  Antecubital       GRAM STAIN GRAM POSITIVE COCCI                 RESULTS VERIFIED, PHONED TO AND READ BACK BY ROSEMARY FARRIS RN BY NB AT 1355 ON [id number]              AEROBIC AND ANAEROBIC BOTTLES     Culture result:         CULTURE IN [address] UPDATES TO FOLLOW              SA target DNA sequence not detected within the sample.  Test performed by PCR    CULTURE, URINE [[id number]]     Order Status:  Canceled Specimen:  Urine from Jeong Specimen           Imaging Antonietta Asters /Studies:    CXR Results  (Last 48 hours)               09/24/17 1818  XR ABD ACUTE W 1 V CHEST Final result    Impression:  IMPRESSION: Possible left renal calculi, degenerative thoracic scoliosis, and   nonspecific bowel gas pattern, cardiomegaly, clear lung fields       Narrative:  Acute abdomen series: 9/24/2017       INDICATION: Abdominal distention       COMPARISON: 8/20/2017       Degenerative thoracolumbar scoliosis is noted. Heart is enlarged and the lung   fields clear. Soft tissues are intact. Remote fracture deformity of the proximal   right humerus is noted. There is no free air. Bowel gas pattern is is   unremarkable. There are no abnormal masses. . Degenerative narrowing of the   bilateral hips and right femoral Nelson pin is noted. Soft tissues are   unremarkable. There are some subtle calculi overlying the left renal silhouette   of which the largest measures 12.6 mm.               CT Results  (Last 48 hours)               09/24/17 1654  CT HEAD WO CONT Final result    Impression:  Impression: Supratentorial microischemia, remote left basal ganglia CVA. Note: If a subtle CVA is suspected, MRI would be more definitive if clinically   warranted. Narrative:  CT scan of the brain 9/24/2017   Scanning was performed within 24 hours of arrival to the facility   Comparison: 8/20/2017   The MAA was adjusted using dose modulation to reduce patient radiation exposure. Indication: Unresponsive   Findings: The brain parenchyma and ventricular structures are remarkable for   subcortical and periventricular white matter lucency. There is a stable CSF   density within the left basal ganglia  white matter. . There is normal white-grey   matter differentiation. There are no interval mass lesions, hemorrhage, or   evidence of an acute stroke. The calvarium and the sinuses are unremarkable. No results found. No results found for this visit on 09/24/17.     Labs and Studies from previous 24 hours have been personally reviewed by myself Felix Marr 96 Problems    Diagnosis Date Noted    UTI (urinary tract infection) 09/24/2017    Acute encephalopathy 11/05/2015    Sepsis ([facility] 11/05/2015     Hospital Problems as of 9/26/2017  Date Reviewed: 7/14/2016          Codes Class Noted - Resolved POA    UTI (urinary tract infection) ICD-10-CM: N39.0  ICD-9-CM: 599.0  9/24/2017 - Present Unknown        * (Principal)Sepsis ([facility] ICD-10-CM: A41.9  ICD-9-CM: 038.9, 995.91  11/5/2015 - Present Yes        Acute encephalopathy ICD-10-CM: G93.40  ICD-9-CM: 348.30  11/5/2015 - Present Unknown              Plan:83 yo fml presenting with acute change in mental status  due to  Sepsis urine  Of origin      Urinary  Tract  Infection  Sepsis   Cont with  aztreonam  For coverage prior culture was sensitive  Will de escalate following urine Culture. Cont   Brief dose of Vancomycin  Given  Blood  culture  GPC     Acute change in mental status   Due to infection  This has improved  will cont  With current management   Cont  Antibiotics    Electrolytes imbalance  Repleted     Acute renal  Failure   Improving  Due to dehydration  Cont  Aggressive  IV  fluids    Urinary overactive bladder  Jeong catheter changed and placed appropriately and draining     HTN  Resume home meds since bp has improves     DM  Sliding scale    Hold oral  Agents at this time   Check glucose qid      Depression  Resume cymbalta      DVT Prophylaxis:  Heparin   CODE Status:   Plan of Care Discussed with: patient.  Care team.  Medical Risk:  Disposition:    Ilan Martinez MD  09/26/17

## 2017-09-26 NOTE — PROGRESS NOTES
Received patient awake, confused to place and time with fluids infusing as ordered to left AC #20 IV. Jeong draining jamison urine with sediment. Nursing assessment completed, bed in low and locked position. Call bell placed within patient's reach. Patient assisted up in bed for comfort.

## 2017-09-26 NOTE — PROGRESS NOTES
Problem: Falls - Risk of  Goal: *Absence of Falls  Document Hanna Fall Risk and appropriate interventions in the flowsheet.    Fall Risk Interventions:  Mobility Interventions: Assess mobility with egress test, Bed/chair exit alarm, Communicate number of staff needed for ambulation/transfer, OT consult for ADLs, Patient to call before getting OOB, PT Consult for mobility concerns, PT Consult for assist device competence, Strengthening exercises (ROM-active/passive), Utilize walker, cane, or other assitive device, Utilize gait belt for transfers/ambulation     Mentation Interventions: Adequate sleep, hydration, pain control, Bed/chair exit alarm, Door open when patient unattended, Evaluate medications/consider consulting pharmacy, Familiar objects from home, Gait belt with transfers/ambulation, Increase mobility, More frequent rounding, Reorient patient, Room close to nurse's station, Self-releasing belt, Toileting rounds, Update white board           Elimination Interventions: Bed/chair exit alarm, Call light in reach, Elevated toilet seat, Patient to call for help with toileting needs, Toilet paper/wipes in reach, Toileting schedule/hourly rounds, Urinal in reach

## 2017-09-27 PROBLEM — N17.9 AKI (ACUTE KIDNEY INJURY) (HCC): Status: ACTIVE | Noted: 2017-09-27

## 2017-09-27 LAB
GLUCOSE BLD STRIP.AUTO-MCNC: 108 MG/DL (ref 65–100)
GLUCOSE BLD STRIP.AUTO-MCNC: 113 MG/DL (ref 65–100)
GLUCOSE BLD STRIP.AUTO-MCNC: 123 MG/DL (ref 65–100)
GLUCOSE BLD STRIP.AUTO-MCNC: 141 MG/DL (ref 65–100)
MM INDURATION POC: 0 MM (ref 0–5)
PPD POC: NORMAL NEGATIVE
VANCOMYCIN TROUGH SERPL-MCNC: 17 UG/ML (ref 5–20)

## 2017-09-27 PROCEDURE — 97163 PT EVAL HIGH COMPLEX 45 MIN: CPT

## 2017-09-27 PROCEDURE — 74011250636 HC RX REV CODE- 250/636: Performed by: HOSPITALIST

## 2017-09-27 PROCEDURE — 74011636637 HC RX REV CODE- 636/637: Performed by: HOSPITALIST

## 2017-09-27 PROCEDURE — 74011250636 HC RX REV CODE- 250/636: Performed by: FAMILY MEDICINE

## 2017-09-27 PROCEDURE — 36415 COLL VENOUS BLD VENIPUNCTURE: CPT | Performed by: HOSPITALIST

## 2017-09-27 PROCEDURE — 74011250636 HC RX REV CODE- 250/636: Performed by: INTERNAL MEDICINE

## 2017-09-27 PROCEDURE — 74011250637 HC RX REV CODE- 250/637: Performed by: INTERNAL MEDICINE

## 2017-09-27 PROCEDURE — 74011000258 HC RX REV CODE- 258: Performed by: FAMILY MEDICINE

## 2017-09-27 PROCEDURE — 74011250637 HC RX REV CODE- 250/637: Performed by: HOSPITALIST

## 2017-09-27 PROCEDURE — 77030019605

## 2017-09-27 PROCEDURE — 65270000029 HC RM PRIVATE

## 2017-09-27 PROCEDURE — 97165 OT EVAL LOW COMPLEX 30 MIN: CPT

## 2017-09-27 PROCEDURE — 80202 ASSAY OF VANCOMYCIN: CPT | Performed by: HOSPITALIST

## 2017-09-27 PROCEDURE — 82962 GLUCOSE BLOOD TEST: CPT

## 2017-09-27 RX ORDER — LISINOPRIL 20 MG/1
20 TABLET ORAL DAILY
Status: DISCONTINUED | OUTPATIENT
Start: 2017-09-27 | End: 2017-09-28

## 2017-09-27 RX ORDER — HYDRALAZINE HYDROCHLORIDE 20 MG/ML
10 INJECTION INTRAMUSCULAR; INTRAVENOUS
Status: DISCONTINUED | OUTPATIENT
Start: 2017-09-27 | End: 2017-09-29 | Stop reason: HOSPADM

## 2017-09-27 RX ADMIN — INSULIN GLARGINE 10 UNITS: 100 INJECTION, SOLUTION SUBCUTANEOUS at 09:45

## 2017-09-27 RX ADMIN — CEFEPIME HYDROCHLORIDE 2 G: 2 INJECTION, POWDER, FOR SOLUTION INTRAVENOUS at 16:24

## 2017-09-27 RX ADMIN — HEPARIN SODIUM 5000 UNITS: 5000 INJECTION, SOLUTION INTRAVENOUS; SUBCUTANEOUS at 21:24

## 2017-09-27 RX ADMIN — HEPARIN SODIUM 5000 UNITS: 5000 INJECTION, SOLUTION INTRAVENOUS; SUBCUTANEOUS at 04:54

## 2017-09-27 RX ADMIN — METOPROLOL SUCCINATE 25 MG: 25 TABLET, EXTENDED RELEASE ORAL at 09:43

## 2017-09-27 RX ADMIN — DULOXETINE HYDROCHLORIDE 60 MG: 60 CAPSULE, DELAYED RELEASE ORAL at 09:43

## 2017-09-27 RX ADMIN — VANCOMYCIN HYDROCHLORIDE 750 MG: 10 INJECTION, POWDER, LYOPHILIZED, FOR SOLUTION INTRAVENOUS at 09:46

## 2017-09-27 RX ADMIN — DIPHENHYDRAMINE HYDROCHLORIDE 12.5 MG: 50 INJECTION, SOLUTION INTRAMUSCULAR; INTRAVENOUS at 01:48

## 2017-09-27 RX ADMIN — ATORVASTATIN CALCIUM 80 MG: 40 TABLET, FILM COATED ORAL at 21:24

## 2017-09-27 RX ADMIN — HYDRALAZINE HYDROCHLORIDE 10 MG: 20 INJECTION INTRAMUSCULAR; INTRAVENOUS at 01:48

## 2017-09-27 RX ADMIN — ASPIRIN 81 MG: 81 TABLET, COATED ORAL at 09:00

## 2017-09-27 RX ADMIN — HEPARIN SODIUM 5000 UNITS: 5000 INJECTION, SOLUTION INTRAVENOUS; SUBCUTANEOUS at 13:48

## 2017-09-27 RX ADMIN — SODIUM CHLORIDE 125 ML/HR: 900 INJECTION, SOLUTION INTRAVENOUS at 01:00

## 2017-09-27 RX ADMIN — HYDRALAZINE HYDROCHLORIDE 10 MG: 20 INJECTION INTRAMUSCULAR; INTRAVENOUS at 16:24

## 2017-09-27 RX ADMIN — HYDRALAZINE HYDROCHLORIDE 10 MG: 20 INJECTION INTRAMUSCULAR; INTRAVENOUS at 21:24

## 2017-09-27 RX ADMIN — VANCOMYCIN HYDROCHLORIDE 750 MG: 10 INJECTION, POWDER, LYOPHILIZED, FOR SOLUTION INTRAVENOUS at 21:24

## 2017-09-27 RX ADMIN — CEFEPIME HYDROCHLORIDE 2 G: 2 INJECTION, POWDER, FOR SOLUTION INTRAVENOUS at 04:54

## 2017-09-27 RX ADMIN — LISINOPRIL 20 MG: 20 TABLET ORAL at 09:43

## 2017-09-27 NOTE — PROGRESS NOTES
Problem: Self Care Deficits Care Plan (Adult)  Goal: *Acute Goals and Plan of Care (Insert Text)      OCCUPATIONAL THERAPY: Initial Assessment, Discharge and AM 9/27/2017  INPATIENT: Hospital Day: 4  [place] / Product Type: Medicare /      NAME/AGE/GENDER: Siri Myers is a [de-identified] y.o. female             PRIMARY DIAGNOSIS:  Acute encephalopathy  UTI (urinary tract infection) Sepsis ([facility] [address] Sepsis ([facility] [address]        ICD-10: Treatment Diagnosis:        · Generalized Muscle Weakness (M62.81)  · Other lack of cordination (R27.8)   Precautions/Allergies:         Pcn [penicillins]       ASSESSMENT:      Ms. Harmony Ann presents supine in bed. She is alert with decrease direction following. Her right UE held in flexor pattern with soft R hand positioner in place. Hand rom effective for hygiene. L UE AROM grossly WFL. Able to grab trapeze bar with assistance and reached for therapist with decreased coordination. Ms. Harmony Ann is total assist for all ADLS. She was fed thicked drink with a spoon while seated on EOB upright and coughed. Total assist to change breif and perform hygiene in bed, muñoz in place. Nursing was advised. Prior to admit patient was total assist for ADLS and transfers. No skilled OT indicated at this time. Patient is a long term care patient at [facility]. This section established at most recent assessment     1. RECOMMENDED REHABILITATION/EQUIPMENT: (at time of discharge pending progress): Due to the probability of continued deficits (see above) this patient will not likely need continued skilled occupational therapy after discharge. Equipment:   · None at this time                      OCCUPATIONAL PROFILE AND HISTORY:   History of Present Injury/Illness (Reason for Referral):  Decreased self care and functional mobility  Past Medical History/Comorbidities:   Ms. 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 Ms. Brenda Quinones  has a past surgical history that includes orthopaedic (2006) and heent (1950's). Social History/Living Environment:   Home Environment: [address] [place] [address] Name: [facility]  # Steps to Enter: 0  One/Two Story Residence: One story  Living Alone: No  Support Systems: Skilled nursing facility  Patient Expects to be Discharged to[de-identified] Skilled nursing facility  Current DME Used/Available at Home: [address]  Tub or Shower Type: Shower  Prior Level of Function/Work/Activity:  Dependent for ADLS and transfers, long term care at [facility]      Number of Personal Factors/Comorbidities that affect the Plan of Care: Brief history (0):  LOW COMPLEXITY   ASSESSMENT OF OCCUPATIONAL PERFORMANCE[de-identified]   Activities of Daily Living:           Basic ADLs (From Assessment) Complex ADLs (From Assessment)   Basic ADL  Feeding: Total assistance (honey thick)  Oral Facial Hygiene/Grooming: Total assistance  Bathing: Total assistance  Upper Body Dressing: Total assistance  Lower Body Dressing: Total assistance  Toileting:  Total assistance (muñoz and brief)     Grooming/Bathing/Dressing Activities of Daily Living     Cognitive Retraining  Safety/Judgement: Fall prevention                       Bed/Mat Mobility  Rolling: Maximum assistance;Assist x2  Supine to Sit: Maximum assistance;Assist x2  Sit to Supine: Maximum assistance;Assist x2  Scooting: Maximum assistance;Assist x2          Most Recent Physical Functioning:   Gross Assessment:                  Posture:  Posture Assessment:  (R side neglect, leans posteriorly and towards R in sitting.)  Balance:  Sitting: With support  Standing:  (NA) Bed Mobility:  Rolling: Maximum assistance;Assist x2  Supine to Sit: Maximum assistance;Assist x2  Sit to Supine: Maximum assistance;Assist x2  Scooting: Maximum assistance;Assist x2  Wheelchair Mobility:     Transfers:         AROM L UE grossly WFL, fine and gross motor coordination impaired  R UE held in flexor synergy pattern with soft positioner in R Hand                Patient Vitals for the past 6 hrs:       BP BP Patient Position SpO2 Pulse   17 0508 (!) 188/108 - 98 % 95   17 0731 (!) 158/92 At rest 95 % 84        Mental Status  Neurologic State: Alert, Confused  Orientation Level: Oriented to person, Disoriented to place, Disoriented to situation, Disoriented to time  Cognition: Decreased attention/concentration, Decreased command following, Memory loss  Perception: Cues to attend to right side of body, Cues to maintain midline in sitting, Cues to attend right visual field  Perseveration: No perseveration noted  Safety/Judgement: Fall prevention                               1.  1.  1.          CLINICAL DECISION MAKIN[address] [place] [id number] AM-PAC 6 Clicks   Daily Activity Inpatient Short Form  How much help from another person does the patient currently need. .. Total A Lot A Little None   1. Putting on and taking off regular lower body clothing? [X] 1   [ ] 2   [ ] 3   [ ] 4   2. Bathing (including washing, rinsing, drying)? [X] 1   [ ] 2   [ ] 3   [ ] 4   3. Toileting, which includes using toilet, bedpan or urinal?   [X] 1   [ ] 2   [ ] 3   [ ] 4   4. Putting on and taking off regular upper body clothing? [X] 1   [ ] 2   [ ] 3   [ ] 4   5. Taking care of personal grooming such as brushing teeth? [X] 1   [ ] 2   [ ] 3   [ ] 4   6. Eating meals? [X] 1   [ ] 2   [ ] 3   [ ] 4   © , Trustees of [address] [place] [address] [address] [address], under license to [employer].  All rights reserved    Score:  Initial:6 Most Recent: X (Date: -- )     Interpretation of Tool:  Represents activities that are increasingly more difficult (i.e. Bed mobility, Transfers, Gait). Score 24 23 22-20 19-15 14-10 9-7 6       Modifier CH CI CJ CK CL CM CN         · Self Care:               - CURRENT STATUS:    CN - 100% impaired, limited or restricted               - GOAL STATUS:           CN - 100% impaired, limited or restricted               - D/C STATUS:                       CN - 100% impaired, limited or restricted  [place] / Product Type: Medicare /        ·    Use of outcome tool(s) and clinical judgement create a POC that gives a:               TREATMENT:   (In addition to Assessment/Re-Assessment sessions the following treatments were rendered)      Pre-treatment Symptoms/Complaints:  none  Pain: Initial:   Pain Intensity 1: 0  Post Session:  0/10      Assessment/Reassessment only, no treatment provided today     Braces/Orthotics/Lines/Etc:   · IV  · muñoz catheter  · O2 Device: Room air  Treatment/Session Assessment:    · Response to Treatment:  Tolerated well, no skilled OT indicated at this time  · Interdisciplinary Collaboration:  · Physical Therapist  · Occupational Therapist  · Registered Nurse  · After treatment position/precautions:  · Supine in bed  · Bed/Chair-wheels locked  · Bed in low position  · Call light within reach  · RN notified  · Side rails x 3  · Compliance with Program/Exercises: compliant all of the time.      No skilled OT indicated at this time  Discharge OT  Total Treatment Duration:  OT Patient Time In/Time Out  Time In: 0910  Time Out: [address] [facility], OT

## 2017-09-27 NOTE — PROGRESS NOTES
Received patient awake, with confused to place and time, in bed. IV to LAC #20 infusing fluids as ordered. Patient is on abx. Patient repositioned up in bed. Jeong to bedside, draining and intact. Nursing assessment completed. Bed in low and locked position.

## 2017-09-27 NOTE — PROGRESS NOTES
Problem: Mobility Impaired (Adult and Pediatric)  Goal: *Acute Goals and Plan of Care (Insert Text)  PT TRIAL for 3 DAYS  STG:  (1.)Ms. Torey Flores will move from supine to sit and sit to supine with MAXIMAL ASSIST of 1 within 3 day(s). (2.)Ms. Torey Flores will tolerate sitting on edge of bed with moderate trunk support for 4 minutes within 3 day(s). (3.)Ms. Torey Flores will perform 10 reps of AROM with L Es with minimal cues within 3 day(s). ________________________________________________________________________________________________      PHYSICAL THERAPY: INITIAL ASSESSMENT, AM 9/27/2017      PT TRIAL for 3 DAYS     INPATIENT: Hospital Day: 4  [place] / Product Type: Medicare /      NAME/AGE/GENDER: Falguni Upton is a [de-identified] y.o. female             PRIMARY DIAGNOSIS: Acute encephalopathy  UTI (urinary tract infection) Sepsis ([facility] Sepsis ([facility]        ICD-10: Treatment Diagnosis:       · Generalized Muscle Weakness (M62.81)  · Other lack of cordination (R27.8)  · Other abnormalities of gait and mobility (R26.89)  · Hemiplegia and hemiparesis following cerebral infarction affecting right dominant side (I69.351)  · Abnormal posture (R29.3)   Precaution/Allergies:  Pcn [penicillins]       ASSESSMENT:      Ms. Torey Flores presents with sepsis. She has had a L CVA with resultant dense R hemiparesis and demonstrates weakness in B LEs R>L, increased flexor tone in R LE, decreased sitting balance, and dependence with all functional mobility. I spoke with Maritza Allan and she is in their LTC [facility] and DOES NOT receive therapy. She does transfer to  each day using a SPT rather than james lift. I don't know that she make much progress while here with PT, but we will give her a trial for 3 days with the goals of sitting      This section established at most recent assessment   PROBLEM LIST (Impairments causing functional limitations):  1. Decreased Strength  2.  Decreased ADL/Functional Activities  3. Decreased Transfer Abilities  4. Decreased Balance  5. Decreased Activity Tolerance  6. Increased Fatigue  7. Decreased Flexibility/Joint Mobility    INTERVENTIONS PLANNED: (Benefits and precautions of physical therapy have been discussed with the patient.)  1. Balance Exercise  2. Bed Mobility  3. Neuromuscular Re-education/Strengthening  4. Range of Motion (ROM)  5. Therapeutic Activites  6. Therapeutic Exercise/Strengthening  7. Transfer Training      TREATMENT PLAN: Frequency/Duration: daily for 3 DAY TRIAL  Rehabilitation Potential For Stated Goals: GUARDED      RECOMMENDED REHABILITATION/EQUIPMENT: (at time of discharge pending progress): Due to the probability of continued deficits (see above) this patient will not likely need continued skilled physical therapy after discharge. She is total dependence with all mobility  Equipment:   · resides in LTC at [facility] and has hospital bed and wc                   HISTORY:   History of Present Injury/Illness (Reason for Referral): Admitted with sepsis  Past Medical History/Comorbidities:   Ms. 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 Ms. Lucía Lares  has a past surgical history that includes orthopaedic (2006) and heent (1950's).   Social History/Living Environment:   Home Environment: [address] [place] [address] Name: Judy [facility]  # Steps to Enter: 0  One/Two Story Residence: One story  Living Alone: No  Support Systems: Skilled nursing facility  Patient Expects to be Discharged to[de-identified] Skilled nursing facility  Current DME Used/Available at Home: [address]  Tub or Shower Type: Shower  Prior Level of Function/Work/Activity:  Dependence with ADLs, transfers with assist to ,      Number of Personal Factors/Comorbidities that affect the Plan of Care: 3+: HIGH COMPLEXITY   EXAMINATION:   Most Recent Physical Functioning:   Gross Assessment:  PROM: Generally decreased, functional (tight heel cords, knee and hip flexors)  Strength: Grossly decreased, non-functional (L LE grossly 2+/5 spontaneously; unable to test R LE)  Coordination: Generally decreased, functional (impaired Bilaterally)  Tone: Abnormal (increased flexor tone in R LE)               Posture:  Posture Assessment:  (R side neglect, leans posteriorly and towards R in sitting.)  Balance:  Sitting: With support  Standing:  (NA) Bed Mobility:  Rolling: Maximum assistance;Assist x2  Supine to Sit: Maximum assistance;Assist x2  Sit to Supine: Maximum assistance;Assist x2  Scooting: Maximum assistance;Assist x2  Wheelchair Mobility:     Transfers:     Gait:             Body Structures Involved:  1. Voice/Speech  2. Joints  3. Muscles Body Functions Affected:  1. Voice and Speech  2. Neuromusculoskeletal  3. Movement Related Activities and Participation Affected:  1. Mobility  2. Self Care   Number of elements that affect the Plan of Care: 4+: HIGH COMPLEXITY   CLINICAL PRESENTATION:   Presentation: Evolving clinical presentation with unstable and unpredictable characteristics: HIGH COMPLEXITY   CLINICAL DECISION MAKIN [facility] [address] [id number] AM-PAC 6 Clicks   Basic Mobility Inpatient Short Form  How much difficulty does the patient currently have. .. Unable A Lot A Little None   1. Turning over in bed (including adjusting bedclothes, sheets and blankets)? [ ] 1   [X] 2   [ ] 3   [ ] 4   2. Sitting down on and standing up from a chair with arms ( e.g., wheelchair, bedside commode, etc.)   [X] 1   [ ] 2   [ ] 3   [ ] 4   3. Moving from lying on back to sitting on the side of the bed?    [ ] 1   [X] 2   [ ] 3   [ ] 4   How much help from another person does the patient currently need. .. Total A Lot A Little None   4. Moving to and from a bed to a chair (including a wheelchair)? [X] 1   [ ] 2   [ ] 3   [ ] 4   5. Need to walk in hospital room? [X] 1   [ ] 2   [ ] 3   [ ] 4   6. Climbing 3-5 steps with a railing? [X] 1   [ ] 2   [ ] 3   [ ] 4   © 2007, Trustees of [address] [place] [address] [address] [address], under license to [employer]. All rights reserved    Score:  Initial:8 Most Recent: X (Date: -- )     Interpretation of Tool:  Represents activities that are increasingly more difficult (i.e. Bed mobility, Transfers, Gait). Score 24 23 22-20 19-15 14-10 9-7 6       Modifier CH CI CJ CK CL CM CN         · Mobility - Walking and Moving Around:               - CURRENT STATUS:    CM - 80%-99% impaired, limited or restricted               - GOAL STATUS:           CM - 80%-99% impaired, limited or restricted               - D/C STATUS:                       ---------------To be determined---------------  [place] / Product Type: Medicare /       Medical Necessity:     · Patient demonstrates guarded rehab potential due to higher previous functional level.   Reason for Services/Other Comments:  · 3 day trial.   Use of outcome tool(s) and clinical judgement create a POC that gives a: Difficult prediction of patient's progress: HIGH COMPLEXITY                 TREATMENT:   (In addition to Assessment/Re-Assessment sessions the following treatments were rendered)   Pre-treatment Symptoms/Complaints:    Pain: Initial:   Pain Intensity 1: 0  Post Session:  0      Assessment/Reassessment only, no treatment provided today     Braces/Orthotics/Lines/Etc:   · IV  · muñoz catheter  · O2 Device: Room air  Treatment/Session Assessment:    · Response to Treatment:  Tolerated well but not able to participate a whole lot  · Interdisciplinary Collaboration:  · Physical Therapist  · Occupational Therapist  · Registered Nurse  · After treatment position/precautions:  · Supine in bed  · Bed/Chair-wheels locked  · Bed in low position  · Call light within reach  · RN notified  · Side rails x 3  · Compliance with Program/Exercises: Will assess as treatment progresses. · Recommendations/Intent for next treatment session: \"Next visit will focus on advancements to more challenging activities and reduction in assistance provided\".   Total Treatment Duration:  PT Patient Time In/Time Out  Time In: 0855  Time Out: 2020 Lydia Flores, PT

## 2017-09-27 NOTE — PROGRESS NOTES
Care Management Interventions  Mode of Transport at Discharge: Self  Transition of Care Consult (CM Consult): SNF  Partner SNF: Yes  Physical Therapy Consult: Yes  Occupational Therapy Consult: Yes  Current Support Network: Lives with Spouse  Confirm Follow Up Transport: Family  Plan discussed with Pt/Family/Caregiver: Yes  Freedom of Choice Offered: Yes  Discharge Location  Discharge Placement: Skilled nursing facility    MD order rec'd to assist with d/c plans. Patient is a long term resident at [facility]. Dtr is unhappy with the current care her mother is receiving at [facility] and wants to consider moving her mother to a different NH. Patient is under a Medicaid 10 day bed hold confirmed by their business office. They also confirmed that they are currently using her Medicare skilled days. Dtr was faxed a list of area [facility]. She called today and asked me to pursue two local NH to see if they had a long term bed. 1. [facility]  2. [facility]. Dtr aware that her mother may have to return to her current NH if we cannot arrange another facility by d/c. Message left for [facility] admission coor. Referral sent to [facility]. PPD placed. Will follow.   Yessi Hernandez

## 2017-09-27 NOTE — PROGRESS NOTES
Problem: Falls - Risk of  Goal: *Absence of Falls  Document Hanna Fall Risk and appropriate interventions in the flowsheet.    Outcome: Progressing Towards Goal  Fall Risk Interventions:  Mobility Interventions: Assess mobility with egress test, Bed/chair exit alarm     Mentation Interventions: Adequate sleep, hydration, pain control, Bed/chair exit alarm, Door open when patient unattended, Evaluate medications/consider consulting pharmacy, Eyeglasses and hearing aids, Familiar objects from home, Family/sitter at bedside, Gait belt with transfers/ambulation, HELP ([address]) if available, Increase mobility, More frequent rounding, Reorient patient, Room close to nurse's station, Self-releasing belt, Update white board, Toileting rounds           Elimination Interventions: Bed/chair exit alarm, Call light in reach, Elevated toilet seat, Patient to call for help with toileting needs, Toilet paper/wipes in reach, Urinal in reach, Toileting schedule/hourly rounds

## 2017-09-27 NOTE — PROGRESS NOTES
Rec'd call back from [address]. They do no have any long term beds available. I spoke with [facility] admission nurse who reports that none of their facilities can accept a long term Medicaid patient at this time. (14 facilities)  Message left with dtr's cell with this information and I let her know that her mother may be ready for d/c by Friday 9-29. I have encouraged her to contact Ketan Perry to discuss her issues. Will follow.   Philippe Castelan

## 2017-09-27 NOTE — PROGRESS NOTES
Shift assessment complete, see flow sheet for full assessment. Pt alert and oriented to person, denies pain at this time. Pt's words are incomprehensible and slurred due to past CVAs. Respirations even and unlabored breath sounds clear bilaterally. S1 S2 auscultated, VSS. Skin warm, dry allyvn in place. Abdomen soft and non tender, bowel sounds active in all quadrants. Pt on honey thickened liquids.  Bed low and locked, call light within reach, pt advised to call if assistance is needed

## 2017-09-27 NOTE — PROGRESS NOTES
Hospitalist Progress Note    2017  Admit Date: 2017  3:34 PM   NAME: Sarah Ricks   :  1935   MRN:  [id number]   Attending: Dunia Corona MD  PCP:  Maribel Ferguson MD    SUBJECTIVE:     Sarah Ricks is a [de-identified] with h/o L CVA and residual R hemiparesis, who was admitted on  with sepsis from UTI. She was admitted from a longterm SNF bed at [facility]. : minimally verbal. Did shake her head appropriately to yes/no questions. Denies pain, SOB. No family at bedside. Review of Systems negative with exception of pertinent positives noted above      PHYSICAL EXAM       Visit Vitals    /84 (BP 1 Location: Right arm, BP Patient Position: At rest)    Pulse 69    Temp 96.6 °F (35.9 °C)    Resp 16    Ht 5' 7\" (1.702 m)    Wt 85.7 kg (189 lb)    SpO2 98%    BMI 29.6 kg/m2      Temp (24hrs), Av °F (36.1 °C), Min:95.6 °F (35.3 °C), Max:98.4 °F (36.9 °C)    Oxygen Therapy  O2 Sat (%): 98 % (17 1100)  O2 Device: Room air (17 0705)    Intake/Output Summary (Last 24 hours) at 17 1258  Last data filed at 17 0508   Gross per 24 hour   Intake             3404 ml   Output             5800 ml   Net            -2396 ml      General: No acute distress. Head:  Atraumatic Normocephalic. Lungs:  CTA Bilaterally. CVS:  RRR  Abdomen: Soft, NTTP, +BS  Neurologic:  AOx2 (not year).  Dense R hemiparesis      Recent Results (from the past 24 hour(s))   GLUCOSE, POC    Collection Time: 17  4:45 PM   Result Value Ref Range    Glucose (POC) 102 (H) 65 - 100 mg/dL   GLUCOSE, POC    Collection Time: 17  9:05 PM   Result Value Ref Range    Glucose (POC) 111 (H) 65 - 100 mg/dL   Yudith Cabreray    Collection Time: 17  6:20 AM   Result Value Ref Range    Vancomycin,trough 17.0 5 - 20 ug/mL   GLUCOSE, POC    Collection Time: 17  6:22 AM   Result Value Ref Range    Glucose (POC) 108 (H) 65 - 100 mg/dL   GLUCOSE, POC    Collection Time: 17 11:01 AM   Result Value Ref Range    Glucose (POC) 113 (H) 65 - 100 mg/dL   PLEASE READ & DOCUMENT PPD TEST IN 72 HRS    Collection Time: 09/27/17 11:27 AM   Result Value Ref Range    PPD neg Negative    mm Induration 0 mm         Imaging /Procedures /Studies   XR ABD ACUTE W 1 V CHEST   Final Result   IMPRESSION: Possible left renal calculi, degenerative thoracic scoliosis, and   nonspecific bowel gas pattern, cardiomegaly, clear lung fields      CT HEAD WO CONT   Final Result   Impression: Supratentorial microischemia, remote left basal ganglia CVA. Note: If a subtle CVA is suspected, MRI would be more definitive if clinically   warranted. 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 ([facility] ICD-10-CM: E11.49  ICD-9-CM: 250.60  1/26/2016 - Present Yes        Hemiplegia affecting dominant side ([facility] ICD-10-CM: G81.90  ICD-9-CM: 342.91  1/26/2016 - Present Yes    Overview Signed 9/5/2016 10:04 PM by Juan Coles, DO     Right sided             * (Principal)Sepsis [facility]) ICD-10-CM: A41.9  ICD-9-CM: 038.9, 995.91  11/5/2015 - Present Yes        Acute encephalopathy ICD-10-CM: G93.40  ICD-9-CM: 348.30  11/5/2015 - Present Unknown        HTN (hypertension) ICD-10-CM: I10  ICD-9-CM: 401.9  11/5/2015 - Present Yes                  Plan:  - Sepsis from UTI: urine cx with GNRs. Continue cefepime until results. Jeong changed on admission    - Acute encephalopathy: appears to be closer to baseline. Improving with resolution of infections    - ?GPC bacteremia: blood cx from admission both with coag neg staph.  Likely contaminant, but will continue vanc while repeat blood cx pending. Will ask ID if need to continue for longer course once next blood cx results. - DEVAUGHN: improved with IVF and treatment of infection. Decrease IVF today    - HTN: BP elevated yesterday. Restarted home meds, increased lisinopril today    - DM2: well controlled on home lantus 10u and SSI ACHS    DVT Prophylaxis: Hep SQ  Dispo: PT/OT/CM following. Likely back to her longterm SNF bed at discharge.     Becca Canas MD

## 2017-09-28 LAB
ANION GAP SERPL CALC-SCNC: 5 MMOL/L (ref 7–16)
BACTERIA SPEC CULT: ABNORMAL
BASOPHILS # BLD: 0 K/UL (ref 0–0.2)
BASOPHILS NFR BLD: 0 % (ref 0–2)
BUN SERPL-MCNC: 12 MG/DL (ref 8–23)
CALCIUM SERPL-MCNC: 8.5 MG/DL (ref 8.3–10.4)
CHLORIDE SERPL-SCNC: 109 MMOL/L (ref 98–107)
CO2 SERPL-SCNC: 28 MMOL/L (ref 21–32)
CREAT SERPL-MCNC: 0.71 MG/DL (ref 0.6–1)
DIFFERENTIAL METHOD BLD: NORMAL
EOSINOPHIL # BLD: 0.4 K/UL (ref 0–0.8)
EOSINOPHIL NFR BLD: 4 % (ref 0.5–7.8)
ERYTHROCYTE [DISTWIDTH] IN BLOOD BY AUTOMATED COUNT: 13.9 % (ref 11.9–14.6)
GLUCOSE BLD STRIP.AUTO-MCNC: 116 MG/DL (ref 65–100)
GLUCOSE BLD STRIP.AUTO-MCNC: 135 MG/DL (ref 65–100)
GLUCOSE BLD STRIP.AUTO-MCNC: 75 MG/DL (ref 65–100)
GLUCOSE SERPL-MCNC: 96 MG/DL (ref 65–100)
GRAM STN SPEC: ABNORMAL
HCT VFR BLD AUTO: 36.5 % (ref 35.8–46.3)
HGB BLD-MCNC: 11.8 G/DL (ref 11.7–15.4)
IMM GRANULOCYTES # BLD: 0.1 K/UL (ref 0–0.5)
IMM GRANULOCYTES NFR BLD: 0.8 % (ref 0–5)
LYMPHOCYTES # BLD: 2 K/UL (ref 0.5–4.6)
LYMPHOCYTES NFR BLD: 21 % (ref 13–44)
MCH RBC QN AUTO: 28 PG (ref 26.1–32.9)
MCHC RBC AUTO-ENTMCNC: 32.3 G/DL (ref 31.4–35)
MCV RBC AUTO: 86.5 FL (ref 79.6–97.8)
MONOCYTES # BLD: 0.6 K/UL (ref 0.1–1.3)
MONOCYTES NFR BLD: 7 % (ref 4–12)
NEUTS SEG # BLD: 6.5 K/UL (ref 1.7–8.2)
NEUTS SEG NFR BLD: 67 % (ref 43–78)
PLATELET # BLD AUTO: 243 K/UL (ref 150–450)
PMV BLD AUTO: 11.1 FL (ref 10.8–14.1)
POTASSIUM SERPL-SCNC: 2.8 MMOL/L (ref 3.5–5.1)
POTASSIUM SERPL-SCNC: 3.8 MMOL/L (ref 3.5–5.1)
RBC # BLD AUTO: 4.22 M/UL (ref 4.05–5.25)
SERVICE CMNT-IMP: ABNORMAL
SODIUM SERPL-SCNC: 142 MMOL/L (ref 136–145)
WBC # BLD AUTO: 9.7 K/UL (ref 4.3–11.1)

## 2017-09-28 PROCEDURE — 85025 COMPLETE CBC W/AUTO DIFF WBC: CPT | Performed by: INTERNAL MEDICINE

## 2017-09-28 PROCEDURE — 80048 BASIC METABOLIC PNL TOTAL CA: CPT | Performed by: INTERNAL MEDICINE

## 2017-09-28 PROCEDURE — 74011250636 HC RX REV CODE- 250/636: Performed by: INTERNAL MEDICINE

## 2017-09-28 PROCEDURE — 87040 BLOOD CULTURE FOR BACTERIA: CPT | Performed by: INTERNAL MEDICINE

## 2017-09-28 PROCEDURE — 65270000029 HC RM PRIVATE

## 2017-09-28 PROCEDURE — 74011250637 HC RX REV CODE- 250/637: Performed by: INTERNAL MEDICINE

## 2017-09-28 PROCEDURE — 74011250636 HC RX REV CODE- 250/636: Performed by: FAMILY MEDICINE

## 2017-09-28 PROCEDURE — 74011250637 HC RX REV CODE- 250/637: Performed by: HOSPITALIST

## 2017-09-28 PROCEDURE — 97110 THERAPEUTIC EXERCISES: CPT

## 2017-09-28 PROCEDURE — 74011250636 HC RX REV CODE- 250/636: Performed by: HOSPITALIST

## 2017-09-28 PROCEDURE — 84132 ASSAY OF SERUM POTASSIUM: CPT | Performed by: HOSPITALIST

## 2017-09-28 PROCEDURE — 36415 COLL VENOUS BLD VENIPUNCTURE: CPT | Performed by: INTERNAL MEDICINE

## 2017-09-28 PROCEDURE — 97530 THERAPEUTIC ACTIVITIES: CPT

## 2017-09-28 PROCEDURE — 74011636637 HC RX REV CODE- 636/637: Performed by: HOSPITALIST

## 2017-09-28 PROCEDURE — 74011000258 HC RX REV CODE- 258: Performed by: FAMILY MEDICINE

## 2017-09-28 PROCEDURE — 82962 GLUCOSE BLOOD TEST: CPT

## 2017-09-28 RX ORDER — METOPROLOL SUCCINATE 50 MG/1
50 TABLET, EXTENDED RELEASE ORAL DAILY
Status: DISCONTINUED | OUTPATIENT
Start: 2017-09-28 | End: 2017-09-29 | Stop reason: HOSPADM

## 2017-09-28 RX ORDER — LISINOPRIL 20 MG/1
40 TABLET ORAL DAILY
Status: DISCONTINUED | OUTPATIENT
Start: 2017-09-28 | End: 2017-09-29 | Stop reason: HOSPADM

## 2017-09-28 RX ORDER — CIPROFLOXACIN 2 MG/ML
400 INJECTION, SOLUTION INTRAVENOUS EVERY 12 HOURS
Status: DISCONTINUED | OUTPATIENT
Start: 2017-09-28 | End: 2017-09-29 | Stop reason: HOSPADM

## 2017-09-28 RX ORDER — POTASSIUM CHLORIDE 20 MEQ/1
60 TABLET, EXTENDED RELEASE ORAL DAILY
Status: DISCONTINUED | OUTPATIENT
Start: 2017-09-28 | End: 2017-09-29 | Stop reason: HOSPADM

## 2017-09-28 RX ADMIN — SODIUM CHLORIDE 50 ML/HR: 900 INJECTION, SOLUTION INTRAVENOUS at 22:04

## 2017-09-28 RX ADMIN — ATORVASTATIN CALCIUM 80 MG: 40 TABLET, FILM COATED ORAL at 21:37

## 2017-09-28 RX ADMIN — CIPROFLOXACIN 400 MG: 2 INJECTION, SOLUTION INTRAVENOUS at 21:37

## 2017-09-28 RX ADMIN — CEFEPIME HYDROCHLORIDE 2 G: 2 INJECTION, POWDER, FOR SOLUTION INTRAVENOUS at 05:04

## 2017-09-28 RX ADMIN — HEPARIN SODIUM 5000 UNITS: 5000 INJECTION, SOLUTION INTRAVENOUS; SUBCUTANEOUS at 21:38

## 2017-09-28 RX ADMIN — HYDRALAZINE HYDROCHLORIDE 10 MG: 20 INJECTION INTRAMUSCULAR; INTRAVENOUS at 21:37

## 2017-09-28 RX ADMIN — ASPIRIN 81 MG: 81 TABLET, COATED ORAL at 08:00

## 2017-09-28 RX ADMIN — POTASSIUM CHLORIDE 60 MEQ: 20 TABLET, EXTENDED RELEASE ORAL at 08:00

## 2017-09-28 RX ADMIN — METOPROLOL SUCCINATE 50 MG: 50 TABLET, EXTENDED RELEASE ORAL at 08:03

## 2017-09-28 RX ADMIN — CIPROFLOXACIN 400 MG: 2 INJECTION, SOLUTION INTRAVENOUS at 09:31

## 2017-09-28 RX ADMIN — DULOXETINE HYDROCHLORIDE 60 MG: 60 CAPSULE, DELAYED RELEASE ORAL at 08:00

## 2017-09-28 RX ADMIN — VANCOMYCIN HYDROCHLORIDE 750 MG: 10 INJECTION, POWDER, LYOPHILIZED, FOR SOLUTION INTRAVENOUS at 08:05

## 2017-09-28 RX ADMIN — HEPARIN SODIUM 5000 UNITS: 5000 INJECTION, SOLUTION INTRAVENOUS; SUBCUTANEOUS at 05:04

## 2017-09-28 RX ADMIN — INSULIN GLARGINE 10 UNITS: 100 INJECTION, SOLUTION SUBCUTANEOUS at 08:03

## 2017-09-28 RX ADMIN — HEPARIN SODIUM 5000 UNITS: 5000 INJECTION, SOLUTION INTRAVENOUS; SUBCUTANEOUS at 12:27

## 2017-09-28 RX ADMIN — LISINOPRIL 40 MG: 20 TABLET ORAL at 08:03

## 2017-09-28 RX ADMIN — SENNOSIDES AND DOCUSATE SODIUM 1 TABLET: 8.6; 5 TABLET ORAL at 08:00

## 2017-09-28 RX ADMIN — VANCOMYCIN HYDROCHLORIDE 750 MG: 10 INJECTION, POWDER, LYOPHILIZED, FOR SOLUTION INTRAVENOUS at 22:50

## 2017-09-28 NOTE — PROGRESS NOTES
Problem: Mobility Impaired (Adult and Pediatric)  Goal: *Acute Goals and Plan of Care (Insert Text)  PT TRIAL for 3 DAYS  STG:  (1.)Ms. Tera Mattson will move from supine to sit and sit to supine with MAXIMAL ASSIST of 1 within 3 day(s). (2.)Ms. Tera Mattson will tolerate sitting on edge of bed with moderate trunk support for 4 minutes within 3 day(s). (3.)Ms. Tera Mattson will perform 10 reps of AROM with L Es with minimal cues within 3 day(s). ________________________________________________________________________________________________      PHYSICAL THERAPY: Daily Note, Treatment Day: 1st and AM 9/28/2017      PT TRIAL for 3 DAYS     INPATIENT: Hospital Day: 5  [place] / Product Type: Medicare /      NAME/AGE/GENDER: Neha Locke is a [de-identified] y.o. female             PRIMARY DIAGNOSIS: Acute encephalopathy  UTI (urinary tract infection) Sepsis ([facility] Sepsis ([facility]        ICD-10: Treatment Diagnosis:       · Generalized Muscle Weakness (M62.81)  · Other lack of cordination (R27.8)  · Other abnormalities of gait and mobility (R26.89)  · Hemiplegia and hemiparesis following cerebral infarction affecting right dominant side (I69.351)  · Abnormal posture (R29.3)   Precaution/Allergies:  Pcn [penicillins]       ASSESSMENT:      Ms. Tera Mattson presents with sepsis. She has had a L CVA with resultant dense R hemiparesis and demonstrates weakness in B LEs R>L, increased flexor tone in R LE, decreased sitting balance, and dependence with all functional mobility. I spoke with Jeannie Diaz and she is in their LTC [place] and DOES NOT receive therapy. She does transfer to  each day using a SPT rather than james lift. I don't know that she make much progress while here with PT, but we will give her a trial for 3 days with the goals of sitting  Ms. Tera Mattson is alert and willing to participate in PT this am. She works on LE ex in bed and then sitting balance on edge of bed.  I notice that her R medial lower leg just above her ankle is bruised and a small raised area is present. RN informed. I did ask the pt. If she gets in a wc each day at [facility] and she replied, \"No. I stay in bed most of the day\"      This section established at most recent assessment   PROBLEM LIST (Impairments causing functional limitations):  1. Decreased Strength  2. Decreased ADL/Functional Activities  3. Decreased Transfer Abilities  4. Decreased Balance  5. Decreased Activity Tolerance  6. Increased Fatigue  7. Decreased Flexibility/Joint Mobility    INTERVENTIONS PLANNED: (Benefits and precautions of physical therapy have been discussed with the patient.)  1. Balance Exercise  2. Bed Mobility  3. Neuromuscular Re-education/Strengthening  4. Range of Motion (ROM)  5. Therapeutic Activites  6. Therapeutic Exercise/Strengthening  7. Transfer Training      TREATMENT PLAN: Frequency/Duration: daily for 3 DAY TRIAL  Rehabilitation Potential For Stated Goals: GUARDED      RECOMMENDED REHABILITATION/EQUIPMENT: (at time of discharge pending progress): Due to the probability of continued deficits (see above) this patient will not likely need continued skilled physical therapy after discharge. She is total dependence with all mobility  Equipment:   · resides in LTC at [facility] and has hospital bed and wc                   HISTORY:   History of Present Injury/Illness (Reason for Referral): Admitted with sepsis  Past Medical History/Comorbidities:   Ms. 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 Ms. Arabella Boone  has a past surgical history that includes orthopaedic (2006) and heent (1950's).   Social History/Living Environment:   Home Environment: [address] [place] [address] Name: [place]  # Steps to Enter: 0  One/Two Story Residence: One story  Living Alone: No  Support Systems: Skilled nursing facility  Patient Expects to be Discharged to[de-identified] Skilled nursing facility  Current DME Used/Available at Home: [address]  Tub or Shower Type: Shower  Prior Level of Function/Work/Activity:  Dependence with ADLs, transfers with assist to ,      Number of Personal Factors/Comorbidities that affect the Plan of Care: 3+: HIGH COMPLEXITY   EXAMINATION:   Most Recent Physical Functioning:   Gross Assessment:  PROM: Generally decreased, functional (tight heel cords, knee and hip flexors)  Strength: Grossly decreased, non-functional (L LE grossly 2+/5 spontaneously; unable to test R LE)  Coordination: Generally decreased, functional (impaired Bilaterally)  Tone: Abnormal (increased flexor tone in R LE)               Posture:  Posture Assessment:  (R side neglect, leans posteriorly and towards R in sitting.)  Balance:  Sitting: With support   Worked on sitting balance. Pt. Does have the tendency to lean posteriorly. I helped with orienting her to midline. Did better as we shifted weight. Bed Mobility:  Rolling: Maximum assistance  Supine to Sit: Maximum assistance  Sit to Supine: Maximum assistance  Scooting: Maximum assistance  Duration: 15 Minutes   Worked on rolling L/R and bridging on flat bed. Wheelchair Mobility:     Transfers:     Gait:             Body Structures Involved:  1. Voice/Speech  2. Joints  3. Muscles Body Functions Affected:  1. Voice and Speech  2. Neuromusculoskeletal  3. Movement Related Activities and Participation Affected:  1. Mobility  2.  Self Care   Number of elements that affect the Plan of Care: 4+: HIGH COMPLEXITY   CLINICAL PRESENTATION:   Presentation: Evolving clinical presentation with unstable and unpredictable characteristics: HIGH COMPLEXITY   CLINICAL DECISION MAKIN[address] [place] [address] AM-PAC 6 Clicks   Basic Mobility Inpatient Short Form  How much difficulty does the patient currently have. .. Unable A Lot A Little None   1. Turning over in bed (including adjusting bedclothes, sheets and blankets)? [ ] 1   [X] 2   [ ] 3   [ ] 4   2. Sitting down on and standing up from a chair with arms ( e.g., wheelchair, bedside commode, etc.)   [X] 1   [ ] 2   [ ] 3   [ ] 4   3. Moving from lying on back to sitting on the side of the bed? [ ] 1   [X] 2   [ ] 3   [ ] 4   How much help from another person does the patient currently need. .. Total A Lot A Little None   4. Moving to and from a bed to a chair (including a wheelchair)? [X] 1   [ ] 2   [ ] 3   [ ] 4   5. Need to walk in hospital room? [X] 1   [ ] 2   [ ] 3   [ ] 4   6. Climbing 3-5 steps with a railing? [X] 1   [ ] 2   [ ] 3   [ ] 4   © 2007, Trustees of [address] [place], [place] [id number], under license to [employer]. All rights reserved    Score:  Initial:8 Most Recent: X (Date: -- )     Interpretation of Tool:  Represents activities that are increasingly more difficult (i.e. Bed mobility, Transfers, Gait). Score 24 23 22-20 19-15 14-10 9-7 6       Modifier CH CI CJ CK CL CM CN         · Mobility - Walking and Moving Around:               - CURRENT STATUS:    CM - 80%-99% impaired, limited or restricted               - GOAL STATUS:           CM - 80%-99% impaired, limited or restricted               - D/C STATUS:                       ---------------To be determined---------------  [place] / [place] / Product Type: Medicare /       Medical Necessity:     · Patient demonstrates guarded rehab potential due to higher previous functional level.   Reason for Services/Other Comments:  · 3 day trial.   Use of outcome tool(s) and clinical judgement create a POC that gives a: Difficult prediction of patient's progress: HIGH COMPLEXITY                 TREATMENT:   (In addition to Assessment/Re-Assessment sessions the following treatments were rendered)   Pre-treatment Symptoms/Complaints:    Pain: Initial:   Pain Intensity 1:  (moderate discomfort)  Pain Location 1: Ankle, Leg  Pain Orientation 1: Distal, Lower, Medial, Right  Pain Intervention(s) 1: Elevation, Nurse notified, Repositioned  Post Session:  1 RN and CNA informed of bruising on R medial lower leg      Therapeutic Activity: (15 Minutes   ):  Therapeutic activities including  Mobility, Bed transfers and sitting balance and posture to improve mobility, strength, balance, coordination and endurance. Required maximal assist to maintain midline and weight shift. Assisted with feeding her in bed. She was unable to use the spoon with her left hand to eat eggs and oatmeal or drink Orange juice. She ate 100% of eggs and oatmeal, 1/2 juice and 2 sips coffee     Date:  9/28/17 Date:   Date:     Activity/Exercise Parameters Parameters Parameters   Scooting forward/backward 5     Sitting weight shift A/P 5     Sitting weight shift L/R 5     Rolling L and R with bed rail 4     Bridging 5                     Therapeutic Exercise: (15 Minutes):  Exercises per grid below to improve mobility, strength, coordination and endurance. Required moderate visual, verbal and manual cues to promote proper body alignment and facilitate volitional movement. Progressed range and repetitions as indicated. Date:  9/28/17 Date:   Date:     Activity/Exercise Parameters Parameters Parameters   Ankle DF/PF 10AA     Hip AB/AD 10AA     Heel slide 10AA     SAQ 10AA     Passive stretching heel cord 10P     Passive stretching hamstring 10P           **Note bruising and swelling R medial lower leg proximal to medial malleolus.  RN informed             Braces/Orthotics/Lines/Etc:   · IV  · muñoz catheter  · O2 Device: Room air  Treatment/Session Assessment:    · Response to Treatment:  Tolerated well but not able to participate a whole lot  · Interdisciplinary Collaboration:  · Physical Therapist, Registered Nurse and Certified Nursing Assistant/Patient Care Technician  · After treatment position/precautions:  · Supine in bed, Bed/Chair-wheels locked, Bed in low position, Call light within reach, RN notified, Side rails x 3 and CNA at bedside  · Compliance with Program/Exercises: Will assess as treatment progresses. · Recommendations/Intent for next treatment session: \"Next visit will focus on advancements to more challenging activities and reduction in assistance provided\".   Total Treatment Duration:PT Patient Time In/Time Out  Time In: 0900  Time Out: Rose Ques[address], PT

## 2017-09-28 NOTE — PROGRESS NOTES
#20G IV HW started in left hand with excellent blood return on second attempt. Pt. Tolerated with no difficulty. Family x1 at Bedside.

## 2017-09-28 NOTE — PROGRESS NOTES
Second message left with dtr to see if she wants me to pursue any other facilities. No return call so far.   Yessi Hernandez

## 2017-09-28 NOTE — PROGRESS NOTES
Hospitalist Progress Note    2017  Admit Date: 2017  3:34 PM   NAME: Jeff Watters   :  1935   MRN:  [id number]   Attending: Shannan Ma MD  PCP:  Jacob Persaud MD    SUBJECTIVE:     Jeff Watters is a [de-identified] with h/o L CVA and residual R hemiparesis, who was admitted on  with sepsis from UTI. She was admitted from a longterm SNF bed at [facility]. : awake, sitting up. Much more verbal today. Appears to be at her baseline. Denies CP, abd pain, nausea, SOB. No family at bedside. BP has been elevated since admission. Review of Systems negative with exception of pertinent positives noted above      PHYSICAL EXAM       Visit Vitals    BP (!) 143/93 (BP 1 Location: Left arm, BP Patient Position: At rest)    Pulse 81    Temp 95.7 °F (35.4 °C)    Resp 18    Ht 5' 7\" (1.702 m)    Wt 85.7 kg (189 lb)    SpO2 99%    BMI 29.6 kg/m2      Temp (24hrs), Av.2 °F (35.7 °C), Min:95.4 °F (35.2 °C), Max:96.6 °F (35.9 °C)    Oxygen Therapy  O2 Sat (%): 99 % (17 0730)  O2 Device: Room air (17 0705)    Intake/Output Summary (Last 24 hours) at 17 0833  Last data filed at 17 1900   Gross per 24 hour   Intake              480 ml   Output              350 ml   Net              130 ml      General: No acute distress. Head:  Atraumatic Normocephalic. Lungs:  CTA Bilaterally. CVS:  RRR  Abdomen: Soft, NTTP, +BS  Neurologic:  AOx2 (not year).  Dense R hemiparesis      Recent Results (from the past 24 hour(s))   GLUCOSE, POC    Collection Time: 17 11:01 AM   Result Value Ref Range    Glucose (POC) 113 (H) 65 - 100 mg/dL   PLEASE READ & DOCUMENT PPD TEST IN 72 HRS    Collection Time: 17 11:27 AM   Result Value Ref Range    PPD neg Negative    mm Induration 0 mm   GLUCOSE, POC    Collection Time: 17  3:39 PM   Result Value Ref Range    Glucose (POC) 123 (H) 65 - 100 mg/dL   GLUCOSE, POC    Collection Time: 17  8:58 PM   Result Value Ref Range Glucose (POC) 141 (H) 65 - 100 mg/dL   CBC WITH AUTOMATED DIFF    Collection Time: 09/28/17  5:05 AM   Result Value Ref Range    WBC 9.7 4.3 - 11.1 K/uL    RBC 4.22 4.05 - 5.25 M/uL    HGB 11.8 11.7 - 15.4 g/dL    HCT 36.5 35.8 - 46.3 %    MCV 86.5 79.6 - 97.8 FL    MCH 28.0 26.1 - 32.9 PG    MCHC 32.3 31.4 - 35.0 g/dL    RDW 13.9 11.9 - 14.6 %    PLATELET 882 103 - 891 K/uL    MPV 11.1 10.8 - 14.1 FL    DF AUTOMATED      NEUTROPHILS 67 43 - 78 %    LYMPHOCYTES 21 13 - 44 %    MONOCYTES 7 4.0 - 12.0 %    EOSINOPHILS 4 0.5 - 7.8 %    BASOPHILS 0 0.0 - 2.0 %    IMMATURE GRANULOCYTES 0.8 0.0 - 5.0 %    ABS. NEUTROPHILS 6.5 1.7 - 8.2 K/UL    ABS. LYMPHOCYTES 2.0 0.5 - 4.6 K/UL    ABS. MONOCYTES 0.6 0.1 - 1.3 K/UL    ABS. EOSINOPHILS 0.4 0.0 - 0.8 K/UL    ABS. BASOPHILS 0.0 0.0 - 0.2 K/UL    ABS. IMM. GRANS. 0.1 0.0 - 0.5 K/UL   METABOLIC PANEL, BASIC    Collection Time: 09/28/17  5:05 AM   Result Value Ref Range    Sodium 142 136 - 145 mmol/L    Potassium 2.8 (LL) 3.5 - 5.1 mmol/L    Chloride 109 (H) 98 - 107 mmol/L    CO2 28 21 - 32 mmol/L    Anion gap 5 (L) 7 - 16 mmol/L    Glucose 96 65 - 100 mg/dL    BUN 12 8 - 23 MG/DL    Creatinine 0.71 0.6 - 1.0 MG/DL    GFR est AA >60 >60 ml/min/1.73m2    GFR est non-AA >60 >60 ml/min/1.73m2    Calcium 8.5 8.3 - 10.4 MG/DL         Imaging /Procedures /Studies   XR ABD ACUTE W 1 V CHEST   Final Result   IMPRESSION: Possible left renal calculi, degenerative thoracic scoliosis, and   nonspecific bowel gas pattern, cardiomegaly, clear lung fields      CT HEAD WO CONT   Final Result   Impression: Supratentorial microischemia, remote left basal ganglia CVA. Note: If a subtle CVA is suspected, MRI would be more definitive if clinically   warranted.             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 [address] ICD-10-CM: E11.49  ICD-9-CM: 250.60  1/26/2016 - Present Yes        Hemiplegia affecting dominant side ([facility] ICD-10-CM: G81.90  ICD-9-CM: 342.91  1/26/2016 - Present Yes    Overview Signed 9/5/2016 10:04 PM by Sheryl Kahn, DO     Right sided             * (Principal)Sepsis [facility]) ICD-10-CM: A41.9  ICD-9-CM: 038.9, 995.91  11/5/2015 - Present Yes        Acute encephalopathy ICD-10-CM: G93.40  ICD-9-CM: 348.30  11/5/2015 - Present Unknown        HTN (hypertension) ICD-10-CM: I10  ICD-9-CM: 401.9  11/5/2015 - Present Yes                  Plan:  - Sepsis from UTI: urine cx with proteus. Switch to cipro based on susceptibilities. Jeong changed on admission    - Acute encephalopathy: appears to be closer to baseline. Improving with resolution of infections    - ?GPC bacteremia: blood cx from admission both with s. hominis. Likely contaminant, but will continue vanc while repeat blood cx pending. Will ask ID if need to continue for longer course once next blood cx results. - DEVAUGHN: improved with IVF and treatment of infection. Continue IVF at decreased rate today    - HTN: BP elevated for last 2-3 days. Restarted home meds, increased doses further today    - DM2: well controlled on home lantus 10u and SSI ACHS    DVT Prophylaxis: Hep SQ  Dispo: PT/OT/CM following. Likely back to her longterm SNF bed at discharge.     Bhargavi Saldaña MD

## 2017-09-28 NOTE — PROGRESS NOTES
Patient is alert, disoriented x4, and confused. Resting quietly with no distress noted. Shift assessment completed. Dressing to sacrum is dry and intact. Weakness noted in all extremities. R sided weakness r/t CVA in past. Jeong draining clear yellow urine. Pt unable ambulate. PIV infusing without difficulty. Dressing to PIV intact with no S/S of infection. Denies needs. Bed low and locked. Bed alarm on. Call light within reach. Instructed to call for assistance. Will continue to monitor.

## 2017-09-28 NOTE — PROGRESS NOTES
Patient resting in bed. Patient sleepy, eyes open to voice. Lungs clear to auscultation. Right hand contracted with wrap on hand. Above right inner ankle there is a soft bump that is painful to touch, size of a green grape. Neurovascular status WDL. Patients speech mumbled (hard to understand), swallowed whole pills well with thickened tea and apple sauce. Instructed not to get up by themselves and call for assistance or any needs. Patient verbalized understanding. Call bell within reach. Side rails up x3. Bed low and locked. No distress noted.

## 2017-09-28 NOTE — PROGRESS NOTES
Spoke with hospitalist about critical K+ 2.8. Received orders to give once daily 60 meq KCL PO. Redraw K+ 6 hours after dose.

## 2017-09-29 ENCOUNTER — HOSPITAL ENCOUNTER (EMERGENCY)
Age: 82
Discharge: HOME | End: 2017-09-29
Attending: EMERGENCY MEDICINE
Payer: MEDICARE

## 2017-09-29 ENCOUNTER — APPOINTMENT (OUTPATIENT)
Dept: GENERAL RADIOLOGY | Age: 82
End: 2017-09-29
Attending: EMERGENCY MEDICINE
Payer: MEDICARE

## 2017-09-29 VITALS
OXYGEN SATURATION: 100 % | WEIGHT: 189 LBS | HEIGHT: 67 IN | SYSTOLIC BLOOD PRESSURE: 147 MMHG | RESPIRATION RATE: 18 BRPM | HEART RATE: 76 BPM | DIASTOLIC BLOOD PRESSURE: 83 MMHG | BODY MASS INDEX: 29.66 KG/M2 | TEMPERATURE: 97.3 F

## 2017-09-29 VITALS
TEMPERATURE: 97.4 F | DIASTOLIC BLOOD PRESSURE: 89 MMHG | RESPIRATION RATE: 20 BRPM | HEART RATE: 69 BPM | SYSTOLIC BLOOD PRESSURE: 161 MMHG

## 2017-09-29 DIAGNOSIS — R11.10 NON-INTRACTABLE VOMITING, PRESENCE OF NAUSEA NOT SPECIFIED, UNSPECIFIED VOMITING TYPE: Primary | ICD-10-CM

## 2017-09-29 PROBLEM — T83.511A URINARY TRACT INFECTION ASSOCIATED WITH INDWELLING URETHRAL CATHETER (HCC): Status: ACTIVE | Noted: 2017-09-24

## 2017-09-29 LAB
ALBUMIN SERPL-MCNC: 2.6 G/DL (ref 3.2–4.6)
ALBUMIN/GLOB SERPL: 0.7 {RATIO} (ref 1.2–3.5)
ALP SERPL-CCNC: 87 U/L (ref 50–136)
ALT SERPL-CCNC: 24 U/L (ref 12–65)
ANION GAP SERPL CALC-SCNC: 3 MMOL/L (ref 7–16)
ANION GAP SERPL CALC-SCNC: 9 MMOL/L (ref 7–16)
AST SERPL-CCNC: 31 U/L (ref 15–37)
BASOPHILS # BLD: 0 K/UL (ref 0–0.2)
BASOPHILS NFR BLD: 0 % (ref 0–2)
BILIRUB SERPL-MCNC: 0.6 MG/DL (ref 0.2–1.1)
BUN SERPL-MCNC: 14 MG/DL (ref 8–23)
BUN SERPL-MCNC: 15 MG/DL (ref 8–23)
CALCIUM SERPL-MCNC: 8.7 MG/DL (ref 8.3–10.4)
CALCIUM SERPL-MCNC: 8.9 MG/DL (ref 8.3–10.4)
CHLORIDE SERPL-SCNC: 107 MMOL/L (ref 98–107)
CHLORIDE SERPL-SCNC: 108 MMOL/L (ref 98–107)
CO2 SERPL-SCNC: 25 MMOL/L (ref 21–32)
CO2 SERPL-SCNC: 29 MMOL/L (ref 21–32)
CREAT SERPL-MCNC: 0.83 MG/DL (ref 0.6–1)
CREAT SERPL-MCNC: 0.86 MG/DL (ref 0.6–1)
DIFFERENTIAL METHOD BLD: NORMAL
EOSINOPHIL # BLD: 0.4 K/UL (ref 0–0.8)
EOSINOPHIL NFR BLD: 4 % (ref 0.5–7.8)
ERYTHROCYTE [DISTWIDTH] IN BLOOD BY AUTOMATED COUNT: 14 % (ref 11.9–14.6)
ERYTHROCYTE [DISTWIDTH] IN BLOOD BY AUTOMATED COUNT: 14 % (ref 11.9–14.6)
GLOBULIN SER CALC-MCNC: 4 G/DL (ref 2.3–3.5)
GLUCOSE BLD STRIP.AUTO-MCNC: 131 MG/DL (ref 65–100)
GLUCOSE SERPL-MCNC: 107 MG/DL (ref 65–100)
GLUCOSE SERPL-MCNC: 80 MG/DL (ref 65–100)
HCT VFR BLD AUTO: 38.1 % (ref 35.8–46.3)
HCT VFR BLD AUTO: 39.3 % (ref 35.8–46.3)
HGB BLD-MCNC: 12 G/DL (ref 11.7–15.4)
HGB BLD-MCNC: 12.5 G/DL (ref 11.7–15.4)
IMM GRANULOCYTES # BLD: 0.1 K/UL (ref 0–0.5)
IMM GRANULOCYTES NFR BLD: 1.1 % (ref 0–5)
LIPASE SERPL-CCNC: 101 U/L (ref 73–393)
LYMPHOCYTES # BLD: 2.1 K/UL (ref 0.5–4.6)
LYMPHOCYTES NFR BLD: 22 % (ref 13–44)
MCH RBC QN AUTO: 27.8 PG (ref 26.1–32.9)
MCH RBC QN AUTO: 28 PG (ref 26.1–32.9)
MCHC RBC AUTO-ENTMCNC: 31.5 G/DL (ref 31.4–35)
MCHC RBC AUTO-ENTMCNC: 31.8 G/DL (ref 31.4–35)
MCV RBC AUTO: 87.9 FL (ref 79.6–97.8)
MCV RBC AUTO: 88.2 FL (ref 79.6–97.8)
MONOCYTES # BLD: 0.7 K/UL (ref 0.1–1.3)
MONOCYTES NFR BLD: 7 % (ref 4–12)
NEUTS SEG # BLD: 6.2 K/UL (ref 1.7–8.2)
NEUTS SEG NFR BLD: 66 % (ref 43–78)
PLATELET # BLD AUTO: 250 K/UL (ref 150–450)
PLATELET # BLD AUTO: 264 K/UL (ref 150–450)
PMV BLD AUTO: 11 FL (ref 10.8–14.1)
PMV BLD AUTO: 11.4 FL (ref 10.8–14.1)
POTASSIUM SERPL-SCNC: 3.8 MMOL/L (ref 3.5–5.1)
POTASSIUM SERPL-SCNC: 4.3 MMOL/L (ref 3.5–5.1)
PROT SERPL-MCNC: 6.6 G/DL (ref 6.3–8.2)
RBC # BLD AUTO: 4.32 M/UL (ref 4.05–5.25)
RBC # BLD AUTO: 4.47 M/UL (ref 4.05–5.25)
SODIUM SERPL-SCNC: 140 MMOL/L (ref 136–145)
SODIUM SERPL-SCNC: 141 MMOL/L (ref 136–145)
WBC # BLD AUTO: 9.5 K/UL (ref 4.3–11.1)
WBC # BLD AUTO: 9.7 K/UL (ref 4.3–11.1)

## 2017-09-29 PROCEDURE — 99282 EMERGENCY DEPT VISIT SF MDM: CPT | Performed by: EMERGENCY MEDICINE

## 2017-09-29 PROCEDURE — 74022 RADEX COMPL AQT ABD SERIES: CPT

## 2017-09-29 PROCEDURE — 85025 COMPLETE CBC W/AUTO DIFF WBC: CPT | Performed by: INTERNAL MEDICINE

## 2017-09-29 PROCEDURE — 74011250637 HC RX REV CODE- 250/637: Performed by: INTERNAL MEDICINE

## 2017-09-29 PROCEDURE — 85027 COMPLETE CBC AUTOMATED: CPT | Performed by: EMERGENCY MEDICINE

## 2017-09-29 PROCEDURE — 74011250637 HC RX REV CODE- 250/637: Performed by: HOSPITALIST

## 2017-09-29 PROCEDURE — 36415 COLL VENOUS BLD VENIPUNCTURE: CPT | Performed by: INTERNAL MEDICINE

## 2017-09-29 PROCEDURE — 74011250637 HC RX REV CODE- 250/637: Performed by: EMERGENCY MEDICINE

## 2017-09-29 PROCEDURE — 74011250636 HC RX REV CODE- 250/636: Performed by: INTERNAL MEDICINE

## 2017-09-29 PROCEDURE — 80053 COMPREHEN METABOLIC PANEL: CPT | Performed by: EMERGENCY MEDICINE

## 2017-09-29 PROCEDURE — 82962 GLUCOSE BLOOD TEST: CPT

## 2017-09-29 PROCEDURE — 83690 ASSAY OF LIPASE: CPT | Performed by: EMERGENCY MEDICINE

## 2017-09-29 PROCEDURE — 74011250636 HC RX REV CODE- 250/636: Performed by: HOSPITALIST

## 2017-09-29 PROCEDURE — 80048 BASIC METABOLIC PNL TOTAL CA: CPT | Performed by: INTERNAL MEDICINE

## 2017-09-29 PROCEDURE — 97530 THERAPEUTIC ACTIVITIES: CPT

## 2017-09-29 RX ORDER — METOPROLOL SUCCINATE 50 MG/1
50 TABLET, EXTENDED RELEASE ORAL DAILY
Qty: 30 TAB | Refills: 11 | Status: SHIPPED
Start: 2017-09-29

## 2017-09-29 RX ORDER — SODIUM CHLORIDE 9 MG/ML
150 INJECTION, SOLUTION INTRAVENOUS CONTINUOUS
Status: DISCONTINUED | OUTPATIENT
Start: 2017-09-29 | End: 2017-09-30 | Stop reason: HOSPADM

## 2017-09-29 RX ORDER — INSULIN GLARGINE 100 [IU]/ML
5 INJECTION, SOLUTION SUBCUTANEOUS DAILY
Qty: 2 VIAL | Refills: 2 | Status: SHIPPED
Start: 2017-09-29

## 2017-09-29 RX ORDER — CIPROFLOXACIN 500 MG/1
500 TABLET ORAL 2 TIMES DAILY
Qty: 16 TAB | Refills: 0 | Status: SHIPPED
Start: 2017-09-29 | End: 2017-10-07

## 2017-09-29 RX ORDER — ONDANSETRON 2 MG/ML
4 INJECTION INTRAMUSCULAR; INTRAVENOUS
Status: DISCONTINUED | OUTPATIENT
Start: 2017-09-29 | End: 2017-09-30 | Stop reason: HOSPADM

## 2017-09-29 RX ORDER — POTASSIUM CHLORIDE 20 MEQ/1
40 TABLET, EXTENDED RELEASE ORAL DAILY
Qty: 60 TAB | Refills: 0 | Status: SHIPPED
Start: 2017-09-29

## 2017-09-29 RX ORDER — ONDANSETRON 4 MG/1
4 TABLET, ORALLY DISINTEGRATING ORAL
Status: COMPLETED | OUTPATIENT
Start: 2017-09-29 | End: 2017-09-29

## 2017-09-29 RX ORDER — LISINOPRIL 40 MG/1
40 TABLET ORAL DAILY
Qty: 30 TAB | Refills: 11 | Status: SHIPPED
Start: 2017-09-29

## 2017-09-29 RX ORDER — ONDANSETRON 4 MG/1
4 TABLET, ORALLY DISINTEGRATING ORAL
Qty: 15 TAB | Refills: 0 | Status: SHIPPED | OUTPATIENT
Start: 2017-09-29

## 2017-09-29 RX ADMIN — SENNOSIDES AND DOCUSATE SODIUM 1 TABLET: 8.6; 5 TABLET ORAL at 08:22

## 2017-09-29 RX ADMIN — HEPARIN SODIUM 5000 UNITS: 5000 INJECTION, SOLUTION INTRAVENOUS; SUBCUTANEOUS at 06:00

## 2017-09-29 RX ADMIN — ACETAMINOPHEN 325 MG: 325 SOLUTION ORAL at 09:57

## 2017-09-29 RX ADMIN — LISINOPRIL 40 MG: 20 TABLET ORAL at 08:22

## 2017-09-29 RX ADMIN — ASPIRIN 81 MG: 81 TABLET, COATED ORAL at 08:22

## 2017-09-29 RX ADMIN — ONDANSETRON 4 MG: 4 TABLET, ORALLY DISINTEGRATING ORAL at 21:11

## 2017-09-29 RX ADMIN — CIPROFLOXACIN 400 MG: 2 INJECTION, SOLUTION INTRAVENOUS at 08:22

## 2017-09-29 RX ADMIN — HEPARIN SODIUM 5000 UNITS: 5000 INJECTION, SOLUTION INTRAVENOUS; SUBCUTANEOUS at 13:59

## 2017-09-29 RX ADMIN — VANCOMYCIN HYDROCHLORIDE 750 MG: 10 INJECTION, POWDER, LYOPHILIZED, FOR SOLUTION INTRAVENOUS at 09:57

## 2017-09-29 RX ADMIN — POTASSIUM CHLORIDE 60 MEQ: 20 TABLET, EXTENDED RELEASE ORAL at 08:22

## 2017-09-29 RX ADMIN — METOPROLOL SUCCINATE 50 MG: 50 TABLET, EXTENDED RELEASE ORAL at 08:22

## 2017-09-29 RX ADMIN — DULOXETINE HYDROCHLORIDE 60 MG: 60 CAPSULE, DELAYED RELEASE ORAL at 08:22

## 2017-09-29 NOTE — PROGRESS NOTES
Problem: Mobility Impaired (Adult and Pediatric)  Goal: *Acute Goals and Plan of Care (Insert Text)  PT TRIAL for 3 DAYS  STG:  (1.)Ms. Torey Flores will move from supine to sit and sit to supine with MAXIMAL ASSIST of 1 within 3 day(s). (2.)Ms. Torey Flores will tolerate sitting on edge of bed with moderate trunk support for 4 minutes within 3 day(s). (3.)Ms. Torey Flores will perform 10 reps of AROM with L Es with minimal cues within 3 day(s). ________________________________________________________________________________________________      PHYSICAL THERAPY: Daily Note, Treatment Day: 2nd and AM 9/29/2017      PT TRIAL for 3 DAYS     INPATIENT: Hospital Day: 6  [place] / Product Type: Medicare /      NAME/AGE/GENDER: Falguni Upton is a [de-identified] y.o. female             PRIMARY DIAGNOSIS: Acute encephalopathy  UTI (urinary tract infection) Sepsis ([facility] Sepsis ([facility]        ICD-10: Treatment Diagnosis:       · Generalized Muscle Weakness (M62.81)  · Other lack of cordination (R27.8)  · Other abnormalities of gait and mobility (R26.89)  · Hemiplegia and hemiparesis following cerebral infarction affecting right dominant side (I69.351)  · Abnormal posture (R29.3)   Precaution/Allergies:  Pcn [penicillins]       ASSESSMENT:      Ms. Torey Flores presents with sepsis. She has had a L CVA with resultant dense R hemiparesis and demonstrates weakness in B LEs R>L, increased flexor tone in R LE, decreased sitting balance, and dependence with all functional mobility. I spoke with Maritza Allan and she is in their LTC [facility] and DOES NOT receive therapy. She does transfer to  each day using a SPT rather than james lift. I don't know that she make much progress while here with PT, but we will give her a trial for 3 days with the goals of sitting  Pt is supine in bed and alert. She is agreeable to PT.   She transfers supine to sit to EOB with max assist.  She tolerated sitting EOB x 10 minutes with pt leaning over to left elbow most of the time. She is able to hold self up for about 30 seconds with no support. Pt returned supine with pillow in place for comfort. RN in room. This section established at most recent assessment   PROBLEM LIST (Impairments causing functional limitations):  1. Decreased Strength  2. Decreased ADL/Functional Activities  3. Decreased Transfer Abilities  4. Decreased Balance  5. Decreased Activity Tolerance  6. Increased Fatigue  7. Decreased Flexibility/Joint Mobility    INTERVENTIONS PLANNED: (Benefits and precautions of physical therapy have been discussed with the patient.)  1. Balance Exercise  2. Bed Mobility  3. Neuromuscular Re-education/Strengthening  4. Range of Motion (ROM)  5. Therapeutic Activites  6. Therapeutic Exercise/Strengthening  7. Transfer Training      TREATMENT PLAN: Frequency/Duration: daily for 3 DAY TRIAL  Rehabilitation Potential For Stated Goals: GUARDED      RECOMMENDED REHABILITATION/EQUIPMENT: (at time of discharge pending progress): Due to the probability of continued deficits (see above) this patient will not likely need continued skilled physical therapy after discharge. She is total dependence with all mobility  Equipment:   · resides in LTC at [facility] and has hospital bed and wc                   HISTORY:   History of Present Injury/Illness (Reason for Referral): Admitted with sepsis  Past Medical History/Comorbidities:   Ms. 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 Ms. Brenda Quinones  has a past surgical history that includes orthopaedic (2006) and heent (1950's).   Social History/Living Environment: Home Environment: [address] [place] [address] Name: Maryjo Miranda  # Steps to Enter: 0  One/Two Story Residence: One story  Living Alone: No  Support Systems: Skilled nursing facility  Patient Expects to be Discharged to[de-identified] Skilled nursing facility  Current DME Used/Available at Home: [address]  Tub or Shower Type: Shower  Prior Level of Function/Work/Activity:  Dependence with ADLs, transfers with assist to wc,      Number of Personal Factors/Comorbidities that affect the Plan of Care: 3+: HIGH COMPLEXITY   EXAMINATION:   Most Recent Physical Functioning:   Gross Assessment:                  Posture:     Balance:      Worked on sitting balance. Pt. Does have the tendency to lean posteriorly. I helped with orienting her to midline. Did better as we shifted weight. Bed Mobility:  Rolling: Maximum assistance  Supine to Sit: Maximum assistance  Sit to Supine: Maximum assistance  Duration: 27 Minutes     Wheelchair Mobility:     Transfers:     Gait:             Body Structures Involved:  1. Voice/Speech  2. Joints  3. Muscles Body Functions Affected:  1. Voice and Speech  2. Neuromusculoskeletal  3. Movement Related Activities and Participation Affected:  1. Mobility  2. Self Care   Number of elements that affect the Plan of Care: 4+: HIGH COMPLEXITY   CLINICAL PRESENTATION:   Presentation: Evolving clinical presentation with unstable and unpredictable characteristics: HIGH COMPLEXITY   CLINICAL DECISION MAKIN [facility] [address] [id number] AM-PAC 6 Clicks   Basic Mobility Inpatient Short Form  How much difficulty does the patient currently have. .. Unable A Lot A Little None   1. Turning over in bed (including adjusting bedclothes, sheets and blankets)? [ ] 1   [X] 2   [ ] 3   [ ] 4   2. Sitting down on and standing up from a chair with arms ( e.g., wheelchair, bedside commode, etc.)   [X] 1   [ ] 2   [ ] 3   [ ] 4   3. Moving from lying on back to sitting on the side of the bed?    [ ] 1 [X] 2   [ ] 3   [ ] 4   How much help from another person does the patient currently need. .. Total A Lot A Little None   4. Moving to and from a bed to a chair (including a wheelchair)? [X] 1   [ ] 2   [ ] 3   [ ] 4   5. Need to walk in hospital room? [X] 1   [ ] 2   [ ] 3   [ ] 4   6. Climbing 3-5 steps with a railing? [X] 1   [ ] 2   [ ] 3   [ ] 4   © 2007, Trustees of [address] [place] [address] [address] [id number], under license to [employer]. All rights reserved    Score:  Initial:8 Most Recent: X (Date: -- )     Interpretation of Tool:  Represents activities that are increasingly more difficult (i.e. Bed mobility, Transfers, Gait). Score 24 23 22-20 19-15 14-10 9-7 6       Modifier CH CI CJ CK CL CM CN         · Mobility - Walking and Moving Around:               - CURRENT STATUS:    CM - 80%-99% impaired, limited or restricted               - GOAL STATUS:           CM - 80%-99% impaired, limited or restricted               - D/C STATUS:                       ---------------To be determined---------------  [place] / [place] / Product Type: Medicare /       Medical Necessity:     · Patient demonstrates guarded rehab potential due to higher previous functional level. Reason for Services/Other Comments:  · 3 day trial.   Use of outcome tool(s) and clinical judgement create a POC that gives a: Difficult prediction of patient's progress: HIGH COMPLEXITY                 TREATMENT:   (In addition to Assessment/Re-Assessment sessions the following treatments were rendered)   Pre-treatment Symptoms/Complaints:    Pain: Initial:      Post Session:  Complaints of leg pain. RN aware. Therapeutic Activity: (27 Minutes   ):  Therapeutic activities including  Mobility, Bed transfers and sitting balance and posture to improve mobility, strength, balance, coordination and endurance. Required maximal assist to maintain midline and weight shift.       Date:  9/28/17 Date:   Date: Activity/Exercise Parameters Parameters Parameters   Scooting forward/backward 5     Sitting weight shift A/P 5     Sitting weight shift L/R 5     Rolling L and R with bed rail 4     Bridging 5                     Therapeutic Exercise: ( ):  Exercises per grid below to improve mobility, strength, coordination and endurance. Required moderate visual, verbal and manual cues to promote proper body alignment and facilitate volitional movement. Progressed range and repetitions as indicated. Date:  9/28/17 Date:   Date:     Activity/Exercise Parameters Parameters Parameters   Ankle DF/PF 10AA     Hip AB/AD 10AA     Heel slide 10AA     SAQ 10AA     Passive stretching heel cord 10P     Passive stretching hamstring 10P           **Note bruising and swelling R medial lower leg proximal to medial malleolus. RN informed    Braces/Orthotics/Lines/Etc:   · IV  · muñoz catheter  · O2 Device: Room air  Treatment/Session Assessment:    · Response to Treatment:  Tolerated well but not able to participate a whole lot  · Interdisciplinary Collaboration:  · Registered Nurse and Rehabilitation Attendant  · After treatment position/precautions:  · Supine in bed, Bed/Chair-wheels locked, Bed in low position, Call light within reach, RN notified, Side rails x 3 and RN at bedside  · Compliance with Program/Exercises: Will assess as treatment progresses. · Recommendations/Intent for next treatment session: \"Next visit will focus on advancements to more challenging activities and reduction in assistance provided\".   Total Treatment Duration:PT Patient Time In/Time Out  Time In: 0940  Time Out: [address] PTA

## 2017-09-29 NOTE — PROGRESS NOTES
TRANSFER - OUT REPORT:    Verbal report given to Ed RN on [facility]  being transferred to PACU for routine progression of care       Report consisted of patients Situation, Background, Assessment and   Recommendations(SBAR). Information from the following report(s) SBAR, Kardex, OR Summary, Procedure Summary, Intake/Output, MAR, Accordion, Recent Results and Med Rec Status was reviewed with the receiving nurse. Lines:   Peripheral IV 09/29/17 Left Arm (Active)   Site Assessment Clean, dry, & intact 9/29/2017  8:40 AM   Phlebitis Assessment 0 9/29/2017  8:40 AM   Infiltration Assessment 0 9/29/2017  8:40 AM   Dressing Status Clean, dry, & intact 9/29/2017  8:40 AM   Dressing Type Tape;Transparent 9/29/2017  8:40 AM   Hub Color/Line Status Infusing 9/29/2017  8:40 AM        Opportunity for questions and clarification was provided.       Patient transported with:   ambulance

## 2017-09-29 NOTE — ED PROVIDER NOTES
HPI Comments: 27-year-old female with history of hemorrhagic stroke, who was discharged from the hospital earlier today due to an episode of dehydration and hypertension  Reportedly, the patient had several episodes of vomiting at the return to her nursing facility after the ambulance ride. In discussion with Dr. Davion Rush at discharging doctor. She was told that this may have had a dark appearance and there is some concern for the potential for GI bleeding. Dr. Davion Rush sated during her several days here in the hospital, She had no episodes of vomiting. 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 female presenting with vomiting. The history is provided by the EMS personnel and the nursing home. Vomiting    This is a new problem. The current episode started less than 1 hour ago. The problem occurs 2 to 4 times per day. The problem has been resolved. There has been no fever. Pertinent negatives include no fever, no diarrhea and no cough. The patient is not pregnant. Her pertinent negatives include no irritable bowel syndrome.         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 ([address] 1/26/2016    Hemiplegia affecting dominant side ([address] 1/26/2016    Hypertension     Mixed hyperlipidemia 1/26/2016    Neuropathy in diabetes ([address] 1/26/2016    Overactive bladder 1/26/2016    Psychiatric disorder     Stroke [facility])        Past Surgical History:   Procedure Laterality Date    HX HEENT  1950's    left sinus    HX ORTHOPAEDIC  2006    hip replacement and filter in right leg         Family History:   Problem Relation Age of Onset    Other Other      aneurysm    Cancer Other      cancer    Thyroid Disease Other     Hypertension Other     Diabetes Other     Dementia Other     Heart Disease Other     Dementia Daughter      factor 5, visual heart murmur    Hypertension Daughter     Diabetes Daughter     Hypertension Mother     Heart Disease Mother     Alzheimer Father     Hypertension Father     Hypertension Paternal Aunt     Heart Disease Paternal Uncle     Hypertension Paternal Uncle     Other Maternal Grandmother      aneurysm    Hypertension Maternal Grandmother        Social History     Social History    Marital status:      Spouse name: N/A    Number of children: N/A    Years of education: N/A     Occupational History    Not on file. Social History Main Topics    Smoking status: Former Smoker    Smokeless tobacco: Not on file      Comment: smoked from 15 yoa to about 78 yoa    Alcohol use No    Drug use: No    Sexual activity: Not on file     Other Topics Concern    Not on file     Social History Narrative         ALLERGIES: Pcn [penicillins]    Review of Systems   Unable to perform ROS: Patient nonverbal   Constitutional: Negative for fever. Respiratory: Negative for cough. Gastrointestinal: Positive for vomiting. Negative for diarrhea. Vitals:    09/29/17 1641   BP: 161/89   Pulse: 69   Resp: 20   Temp: 97.4 °F (36.3 °C)            Physical Exam   Constitutional: She is oriented to person, place, and time. She appears well-developed and well-nourished. She appears distressed. HENT:   Head: Normocephalic and atraumatic. Mouth/Throat: Oropharynx is clear and moist.   Eyes: Conjunctivae and EOM are normal. Pupils are equal, round, and reactive to light. Right eye exhibits no discharge. Left eye exhibits no discharge. No scleral icterus. Neck: Normal range of motion. Neck supple. No thyromegaly present. Cardiovascular: Normal rate, regular rhythm, normal heart sounds and intact distal pulses. Exam reveals no gallop and no friction rub. No murmur heard. Pulmonary/Chest: Effort normal and breath sounds normal. No respiratory distress. She has no wheezes. She exhibits no tenderness. Abdominal: Soft.  Normal appearance and bowel sounds are normal. She exhibits no distension. There is no hepatosplenomegaly. There is no tenderness. There is no rebound and no guarding. No hernia. Musculoskeletal: Normal range of motion. She exhibits no edema or tenderness. Neurological: She is alert and oriented to person, place, and time. No cranial nerve deficit. She exhibits normal muscle tone. Patient is a aphasic  Profound right-sided weakness  Right wrist is splinted. Skin: Skin is warm, dry and intact. No rash noted. She is not diaphoretic. No erythema. No pallor. Psychiatric: Her affect is blunt. Cognition and memory are impaired. She is noncommunicative. Nursing note and vitals reviewed. MDM  Number of Diagnoses or Management Options  Non-intractable vomiting, presence of nausea not specified, unspecified vomiting type: new and requires workup  Diagnosis management comments: Bowel obstruction versus motion sickness versus gastritis       Amount and/or Complexity of Data Reviewed  Clinical lab tests: ordered and reviewed  Tests in the radiology section of CPT®: ordered and reviewed  Tests in the medicine section of CPT®: ordered and reviewed  Review and summarize past medical records: yes    Risk of Complications, Morbidity, and/or Mortality  Presenting problems: moderate  Diagnostic procedures: moderate  Management options: moderate  General comments: Elements of this note have been dictated via voice recognition software. Text and phrases may be limited by the accuracy of the software. The chart has been reviewed, but errors may still be present.       Patient Progress  Patient progress: stable    ED Course       Procedures

## 2017-09-29 NOTE — PROGRESS NOTES
Pt resting in the bed,  Stating she wanted some milk. Thickened the milk and offered it to pt and she said she wanted her son Aria Hartley to have it.  finally got her to take small amount of it and fed small amounts of the potatoes and a couple pieces of chicken. Turned pt to right side. With pillows behind her back and h ips  Pillow placed between knees . Pt  stated she was comfortable. Pt disoriented to place time and situation. But oriented to person but a very poor memory of things in the past.   weekness noted to lower extremities and upper right extremity.

## 2017-09-29 NOTE — DISCHARGE SUMMARY
Hospitalist Discharge Summary     Patient ID:  Renee Fox  [id number]  41 y.o.  1935  Admit date: 9/24/2017  3:34 PM  Discharge date and time: 9/29/2017  Attending: Mickie King MD  PCP:  Leobardo Seip, MD  Treatment Team: Attending Provider: Mickie King MD; Care Manager: HUMAIRA Burgess    Principal Diagnosis Sepsis [facility])   Hospital Problems as of 9/29/2017  Date Reviewed: 7/14/2016          Codes Class Noted - Resolved POA    DEVAUGHN (acute kidney injury) ([facility] ICD-10-CM: N17.9  ICD-9-CM: 584.9  9/27/2017 - Present Yes        Urinary tract infection associated with indwelling urethral catheter ([facility] ICD-10-CM: T83.511A, N39.0  ICD-9-CM: 996.64, 599.0  9/24/2017 - Present Yes        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 ([facility] ICD-10-CM: E11.49  ICD-9-CM: 250.60  1/26/2016 - Present Yes        Hemiplegia affecting dominant side ([facility] ICD-10-CM: G81.90  ICD-9-CM: 342.91  1/26/2016 - Present Yes    Overview Signed 9/5/2016 10:04 PM by Josie Andrade DO     Right sided             * (Principal)Sepsis [facility]) ICD-10-CM: A41.9  ICD-9-CM: 038.9, 995.91  11/5/2015 - Present Yes        Acute encephalopathy ICD-10-CM: G93.40  ICD-9-CM: 348.30  11/5/2015 - Present Unknown        HTN (hypertension) ICD-10-CM: I10  ICD-9-CM: 401.9  11/5/2015 - Present Yes              HPI: history obtain from ED and  Daughter  This is a [de-identified] yo fml with past medical of multiple cva  In the past most recent a year ago sent from nursing home  For evaluation  Of unresponsiveness and difficulty  Controlling her blood pressure on medication. The patient was  Discharge one month ago with a muñoz catheter due to overactive bladder dysfunction. In  The ED the muñoz bag  was found to be empty  With  Poor drainage . At that time the patient had a distended abdomen or bladder the muñoz was removed and  Replaced and over 1.5 liter came out. Surrounding the tip of the catheter were organic residue. Following the removal her blood pressure stabilized, per daughter at baseline the patient is aphasic and has residual weakness. She last saw her mother on Thursday and at that time she felt that  She was looking extremely dehydrated and wanted to speak to the charge nurse about her care. In the ED beside   The elevated BP  The pt was found to have a WBC  29k , given her history of cva a head ct was done that showed no acute ischemia . She was started on broad spectrum antibiotics and medicine was called . Hospital Course: [de-identified] with h/o L CVA and residual R hemiparesis, who was admitted on 9/24 with sepsis from UTI, DEVAUGHN. She was admitted from a longterm SNF bed at [facility]. She was started on IV abx, IVF and her muñoz was exchanged. Her urine cx grew proteus with some resistance, so she was switched to cipro and will continue outpatient to complete a 14 day course. Her confusion and DEVAUGHN improved with IVF and treatment of her infection. Her 2 initial blood cx were positive for S. Hominis and she was initially treated with vancomycin. Repeat blood cx were negative and these blood cx were thought to be contaminants, so vanc was discontined prior to discharge. She has had elevated BP while inpatient, so have adjusted her anti-HTN. Her BS have also been a little low, so will decrease her Lantus at discharge. That may need to be adjusted further as her appetite returns. She will be discharged back to [facility] where she is a longterm resident.        Significant Diagnostic Studies:   Labs: Results:       Chemistry Recent Labs      09/29/17   0453  09/28/17   1457  09/28/17   0505   GLU  80   --   96   NA  140   --   142   K  3.8  3.8  2.8*   CL  108*   --   109*   CO2  29   --   28   BUN  15   --   12   CREA  0.83   --   0.71   CA  8.7   --   8.5   AGAP  3*   --   5*      CBC w/Diff Recent Labs      09/29/17   0453  09/28/17   0505   WBC  9.5  9.7   RBC  4.32  4.22   HGB  12.0  11.8   HCT  38.1  36.5   PLT  250 243   GRANS  66  67   LYMPH  22  21   EOS  4  4      Cardiac Enzymes No results for input(s): CPK, CKND1, KIT in the last 72 hours. No lab exists for component: CKRMB, TROIP   Coagulation No results for input(s): PTP, INR, APTT in the last 72 hours. No lab exists for component: INREXT, INREXT    Lipid Panel Lab Results   Component Value Date/Time    Cholesterol, total 60 08/17/2017 05:35 AM    HDL Cholesterol 26 08/17/2017 05:35 AM    LDL, calculated 31.4 08/17/2017 05:35 AM    VLDL, calculated 2.6 08/17/2017 05:35 AM    Triglyceride 13 08/17/2017 05:35 AM    CHOL/HDL Ratio 2.3 08/17/2017 05:35 AM      BNP No results for input(s): BNPP in the last 72 hours. Liver Enzymes No results for input(s): TP, ALB, TBIL, AP, SGOT, GPT in the last 72 hours. No lab exists for component: DBIL   Thyroid Studies Lab Results   Component Value Date/Time    TSH 1.379 11/11/2015 07:10 PM            Discharge Exam:  Visit Vitals    /83 (BP 1 Location: Left arm, BP Patient Position: At rest)    Pulse 76    Temp 97.3 °F (36.3 °C)    Resp 18    Ht 5' 7\" (1.702 m)    Wt 85.7 kg (189 lb)    SpO2 100%    BMI 29.6 kg/m2     General appearance: alert, cooperative, NAD  Lungs: clear to auscultation bilaterally  Heart: regular rate and rhythm  Abdomen: soft, NTTP  Extremities: mild swollen area of R ankle, no other LE edema  Neurologic: dense R hemiparesis    Disposition: SNF  Discharge Condition: stable  Patient Instructions:   Current Discharge Medication List      START taking these medications    Details   potassium chloride (K-DUR, KLOR-CON) 20 mEq tablet Take 2 Tabs by mouth daily. Qty: 60 Tab, Refills: 0         CONTINUE these medications which have CHANGED    Details   ciprofloxacin HCl (CIPRO) 500 mg tablet Take 1 Tab by mouth two (2) times a day for 8 days. Qty: 16 Tab, Refills: 0      insulin glargine (LANTUS) 100 unit/mL injection 5 Units by SubCUTAneous route daily.   Qty: 2 Vial, Refills: 2      lisinopril (PRINIVIL, ZESTRIL) 40 mg tablet Take 1 Tab by mouth daily. Qty: 30 Tab, Refills: 11      metoprolol succinate (TOPROL-XL) 50 mg XL tablet Take 1 Tab by mouth daily. Qty: 30 Tab, Refills: 11         CONTINUE these medications which have NOT CHANGED    Details   aspirin delayed-release 81 mg tablet Take 1 Tab by mouth daily. Qty: 30 Tab, Refills: 1      atorvastatin (LIPITOR) 80 mg tablet 1 Tab by Per G Tube route nightly. Qty: 30 Tab, Refills: 0      DULoxetine (CYMBALTA) 60 mg capsule Take 1 Cap by mouth daily.   Qty: 90 Cap, Refills: 2             Activity: Activity as tolerated  Diet: Diabetic Diet    Follow-up  -   PCP in 1-2 weeks    Time spent to discharge patient: 35min    Signed:  Edward Fierro MD  9/29/2017  11:35 AM

## 2017-09-29 NOTE — PROGRESS NOTES
I rec'd call back from dtr this am. She was made aware of probable d/c today. Dtr agrees that we will need to send back to [facility]. Dtr wants to meet face to face with the NH prior to her mother's return. I told her that MD may not be able to justify keeping her mother in the hospital over the weekend. I have encourage she meet with them today so that we can send later today. I called [facility] and spoke with their nursing dir-[place] and asked that she call the dtr today.   Philly Oro

## 2017-09-29 NOTE — ED TRIAGE NOTES
Per ems' was discharge from hospital today for vomiting, when took back to the home she vomit so they refuse to keep her there.'

## 2017-09-29 NOTE — PROGRESS NOTES
Patient in bed. Alert and oriented x4. Lungs clear to auscultation. Right inner ankle bump seems more bruised, other than that has not grown in size from yesterday. Dr. Farnsworth Laser aware of \"bump\". Patient turned from back to right side. Left hand swollen from IV infiltration, removed IV and placed a 22 gauge in her Left inner arm. Right hand contracted. Patient denies needs at present. Neurovascular status WDL. Palpable pulses. Instructed to call for assistance or any needs. Patient verbalized understanding. Call bell within reach. Side rails up x3. Bed low and locked. No distress noted.

## 2017-09-30 ENCOUNTER — PATIENT OUTREACH (OUTPATIENT)
Dept: CASE MANAGEMENT | Age: 82
End: 2017-09-30

## 2017-09-30 NOTE — PROGRESS NOTES
Per chart review patient resides at [facility] facility. Patient discharged back to facility. Will close case. This note will not be viewable in [address]

## 2017-09-30 NOTE — ED NOTES
Patient was removed from ER by [facility] crew without my awareness. No last vitals taken.

## 2017-09-30 NOTE — ED NOTES
TRANSFER - OUT REPORT:    Verbal report given to TAMIE Dawson on Jeff Watters  being transferred to [facility], room 143-2 for routine progression of care       Report consisted of patients Situation, Background, Assessment and   Recommendations(SBAR). Information from the following report(s) SBAR, ED Summary, Intake/Output and MAR was reviewed with the receiving nurse. Lines:       Opportunity for questions and clarification was provided. Patient transported with:  EMS crew    VTE prophylaxis orders have not been written for Jeff Watters. Patient and family given floor number and nurses name. Family updated re: pt status after security code verified.

## 2017-10-01 LAB
BACTERIA SPEC CULT: NORMAL
SERVICE CMNT-IMP: NORMAL

## 2017-11-02 ENCOUNTER — HOSPITAL ENCOUNTER (OUTPATIENT)
Dept: LAB | Age: 82
Discharge: HOME | End: 2017-11-02

## 2017-11-02 LAB
BASOPHILS # BLD: 0 K/UL (ref 0–0.2)
BASOPHILS NFR BLD: 0 % (ref 0–2)
DIFFERENTIAL METHOD BLD: NORMAL
EOSINOPHIL # BLD: 0.1 K/UL (ref 0–0.8)
EOSINOPHIL NFR BLD: 1 % (ref 0.5–7.8)
ERYTHROCYTE [DISTWIDTH] IN BLOOD BY AUTOMATED COUNT: 14.2 % (ref 11.9–14.6)
HCT VFR BLD AUTO: 37.7 % (ref 35.8–46.3)
HGB BLD-MCNC: 11.9 G/DL (ref 11.7–15.4)
IMM GRANULOCYTES # BLD: 0.1 K/UL (ref 0–0.5)
IMM GRANULOCYTES NFR BLD: 1 % (ref 0–5)
LYMPHOCYTES # BLD: 1.7 K/UL (ref 0.5–4.6)
LYMPHOCYTES NFR BLD: 19 % (ref 13–44)
MCH RBC QN AUTO: 27.7 PG (ref 26.1–32.9)
MCHC RBC AUTO-ENTMCNC: 31.6 G/DL (ref 31.4–35)
MCV RBC AUTO: 87.9 FL (ref 79.6–97.8)
MONOCYTES # BLD: 0.6 K/UL (ref 0.1–1.3)
MONOCYTES NFR BLD: 7 % (ref 4–12)
NEUTS SEG # BLD: 6.6 K/UL (ref 1.7–8.2)
NEUTS SEG NFR BLD: 72 % (ref 43–78)
PLATELET # BLD AUTO: 348 K/UL (ref 150–450)
PMV BLD AUTO: 11.5 FL (ref 10.8–14.1)
RBC # BLD AUTO: 4.29 M/UL (ref 4.05–5.25)
WBC # BLD AUTO: 9.1 K/UL (ref 4.3–11.1)

## 2017-11-02 PROCEDURE — 85025 COMPLETE CBC W/AUTO DIFF WBC: CPT | Performed by: GENERAL PRACTICE

## 2017-11-10 LAB
ANION GAP SERPL CALC-SCNC: 10 MMOL/L (ref 7–16)
BUN SERPL-MCNC: 24 MG/DL (ref 8–23)
CALCIUM SERPL-MCNC: 8.9 MG/DL (ref 8.3–10.4)
CHLORIDE SERPL-SCNC: 106 MMOL/L (ref 98–107)
CO2 SERPL-SCNC: 26 MMOL/L (ref 21–32)
CREAT SERPL-MCNC: 0.81 MG/DL (ref 0.6–1)
GLUCOSE SERPL-MCNC: 178 MG/DL (ref 65–100)
POTASSIUM SERPL-SCNC: 4.7 MMOL/L (ref 3.5–5.1)
SODIUM SERPL-SCNC: 142 MMOL/L (ref 136–145)

## 2017-11-10 PROCEDURE — 80048 BASIC METABOLIC PNL TOTAL CA: CPT | Performed by: GENERAL PRACTICE

## 2021-08-03 PROBLEM — I63.9 CVA (CEREBRAL VASCULAR ACCIDENT) (HCC): Status: RESOLVED | Noted: 2017-08-17 | Resolved: 2021-08-03
